# Patient Record
Sex: MALE | Race: WHITE | NOT HISPANIC OR LATINO | Employment: OTHER | URBAN - METROPOLITAN AREA
[De-identification: names, ages, dates, MRNs, and addresses within clinical notes are randomized per-mention and may not be internally consistent; named-entity substitution may affect disease eponyms.]

---

## 2023-01-26 ENCOUNTER — TRANSFERRED RECORDS (OUTPATIENT)
Dept: HEALTH INFORMATION MANAGEMENT | Facility: CLINIC | Age: 73
End: 2023-01-26
Payer: COMMERCIAL

## 2023-02-07 ENCOUNTER — TRANSCRIBE ORDERS (OUTPATIENT)
Dept: OTHER | Age: 73
End: 2023-02-07

## 2023-02-07 ENCOUNTER — PATIENT OUTREACH (OUTPATIENT)
Dept: ONCOLOGY | Facility: CLINIC | Age: 73
End: 2023-02-07
Payer: COMMERCIAL

## 2023-02-07 DIAGNOSIS — C06.9 SQUAMOUS CELL CARCINOMA OF MOUTH (H): Primary | ICD-10-CM

## 2023-02-07 NOTE — PROGRESS NOTES
I rec'd a medical oncology referral for this pt: Squamous cell carcinoma of the mouth. Images with [facility] (Dr. Murray Green) ph# [phone]. Path report with [facility] dentistry    I called Wilbert to learn more.  He states when he had a recent dental check up the dentist noticed a growth in his mouth.  The dentist had him return a couple weeks later and since it had not gone away he did a biopsy.  The biopsy shows SCC floor of mouth.  I asked and Wilbert denies having had any imaging at this time.  I explained to Wilbert that the next step is to have him see someone in ENT as we need to determine if further surgery can be done to remove more of the tumor, imaging to see if there are any more areas of concern and then depending on all of these results, he may or may not need to see oncology.    I have sent an urgent IB to ENT to see if they would like to see him first.  I told Wilbert to expect a call in the next couple of days.  I gave him my contact information in case he has any questions before ENT reaches out.    He also gave me the case number for hi pathology at the [facility]:  [id number]

## 2023-02-08 ENCOUNTER — TELEPHONE (OUTPATIENT)
Dept: OTOLARYNGOLOGY | Facility: CLINIC | Age: 73
End: 2023-02-08
Payer: COMMERCIAL

## 2023-02-08 NOTE — TELEPHONE ENCOUNTER
"LVM after being unable to reach on any of the listed numbers.Left ENT scheduling and direct line for scheduling.    SCHEDULING INSTRUCTIONS: Please schedule patient for NEW, NEW TUMOR P1, NEW TUMOR w/either Dr. Marte, Dr. Ballard or Dr. Calderon. Whatever is the first available among those options. Please include \"[facility] (Dr. Murray Green)  Path report with [facility]\" in appointment notes.  "

## 2023-02-08 NOTE — TELEPHONE ENCOUNTER
FUTURE VISIT INFORMATION:      FUTURE VISIT INFORMATION:    Date: 2/22/23    Time:1:30 pm    Location: [facility]  REFERRAL INFORMATION:    Referring provider: [facility]    Referring providers clinic: Dr. Murray Green    Reason for visit/diagnosis [facility] (Dr. Murray Green)  Path report with [facility] dentistry -- Squamous cell carcinoma of the mouth.    RECORDS REQUESTED FROM:       Clinic name Comments Records Status Imaging Status   [facility] *no recs Pending -  req 2/8/23    [facility] [phone] 1/3/23, 1/26/23, and 2/7/23 handwritten chart note from Dr. Murray Green Pending -  req 2/8/23- sent to scan 2/9/23     [facility] Otolaryngology 12/7/22 OV with Umair Gibson MD  CE    Oral Pathology Lab  [facility]    [address] [place] [address]  Ph: [phone]  Fax: [phone] 1/26/23 Case: [id number] Buccal Mucosa- Bisopy: Squamous cell carcinoma req 2/8/23      Sent to scan, re path transfer to [facility]                  February 8, 2023 at 12:39 PM - Sent a fax to [facility] for records/ path report. Reach out to [facility] for recs. Facility can email but is unable to fax as Provider hand wrote notes. Provided my e-mail and call number -Raysa  *Received path report, sent to scan. No records with [facility]. Copy of path report in provider's rightfax folder. Sent a fax to oral path with consult form for path to be transfer to [facility]  February 9, 2023 at 10:34 AM - Received from [facility] note and images sent via to my e-mail. Forward to RN and sent chart note to [facility] -[facility]

## 2023-02-22 ENCOUNTER — THERAPY VISIT (OUTPATIENT)
Dept: SPEECH THERAPY | Facility: CLINIC | Age: 73
End: 2023-02-22
Payer: COMMERCIAL

## 2023-02-22 ENCOUNTER — PRE VISIT (OUTPATIENT)
Dept: OTOLARYNGOLOGY | Facility: CLINIC | Age: 73
End: 2023-02-22

## 2023-02-22 ENCOUNTER — OFFICE VISIT (OUTPATIENT)
Dept: OTOLARYNGOLOGY | Facility: CLINIC | Age: 73
End: 2023-02-22
Payer: COMMERCIAL

## 2023-02-22 VITALS
HEIGHT: 70 IN | SYSTOLIC BLOOD PRESSURE: 138 MMHG | BODY MASS INDEX: 26.48 KG/M2 | WEIGHT: 185 LBS | HEART RATE: 70 BPM | DIASTOLIC BLOOD PRESSURE: 76 MMHG

## 2023-02-22 DIAGNOSIS — C06.9 PRIMARY SQUAMOUS CELL CARCINOMA OF ORAL CAVITY (H): Primary | ICD-10-CM

## 2023-02-22 DIAGNOSIS — C14.8 CANCER OF OVERLAPPING SITES OF LIP, ORAL CAVITY AND PHARYNX (H): ICD-10-CM

## 2023-02-22 PROCEDURE — 99207 PR NO CHARGE LOS: CPT | Performed by: SPEECH-LANGUAGE PATHOLOGIST

## 2023-02-22 PROCEDURE — 99204 OFFICE O/P NEW MOD 45 MIN: CPT | Mod: GC | Performed by: OTOLARYNGOLOGY

## 2023-02-22 RX ORDER — ATORVASTATIN CALCIUM 80 MG/1
40 TABLET, FILM COATED ORAL EVERY MORNING
COMMUNITY
Start: 2022-06-06

## 2023-02-22 RX ORDER — LISINOPRIL 30 MG/1
15 TABLET ORAL EVERY MORNING
COMMUNITY
Start: 2000-01-01

## 2023-02-22 RX ORDER — MULTIVIT WITH MINERALS/LUTEIN
TABLET ORAL EVERY MORNING
Status: ON HOLD | COMMUNITY
Start: 2000-01-01 | End: 2023-03-21

## 2023-02-22 RX ORDER — TAMSULOSIN HYDROCHLORIDE 0.4 MG/1
0.4 CAPSULE ORAL EVERY MORNING
Status: ON HOLD | COMMUNITY
Start: 2022-09-01 | End: 2023-03-21

## 2023-02-22 RX ORDER — MUPIROCIN 20 MG/G
OINTMENT TOPICAL
Status: ON HOLD | COMMUNITY
Start: 2022-06-27 | End: 2023-03-21

## 2023-02-22 RX ORDER — ATENOLOL 25 MG/1
25 TABLET ORAL EVERY MORNING
Status: ON HOLD | COMMUNITY
Start: 2000-01-01 | End: 2023-03-21

## 2023-02-22 RX ORDER — MOXIFLOXACIN 5 MG/ML
1 SOLUTION/ DROPS OPHTHALMIC 2 TIMES DAILY
COMMUNITY
Start: 2018-01-01

## 2023-02-22 RX ORDER — CYCLOSPORINE 0.5 MG/ML
EMULSION OPHTHALMIC
COMMUNITY

## 2023-02-22 ASSESSMENT — PAIN SCALES - GENERAL: PAINLEVEL: NO PAIN (0)

## 2023-02-22 NOTE — PROGRESS NOTES
Jenaro Kerr was seen for allied health care provider visit for introduction of self and SLP services. Pt with newly dx SCC inferior buccal mucosa extending to the RMT. He will undergo a left marginal mandibulectomy with WLE and free flap recon.  Provided information on trajectory of care and benefits of SLP services after surgery. Speaking and swallowing is currently WFL. Formal swallow evaluation indicated post op once medically cleared. All questions answered within scope of practice for pt and his wife. Encouraged pt to reach out with any questions or concerns. Time spent with patient 10 minutes.     MECHE Boles MA (music), CCC-SLP   Speech Language Pathologist  [facility] Trained Vocologist   [facility]  Dept. of Otolaryngology  Department of Rehabilitation Services  [address] [place] [address]  Email: [email or web address]   Phone: [phone]  Pronouns: she/her/hers

## 2023-02-22 NOTE — TELEPHONE ENCOUNTER
FUTURE VISIT INFORMATION      SURGERY INFORMATION:    SHALONDA Ballard/Vira    Consult: ov     RECORDS REQUESTED FROM:       Primary Care Provider: Matt    Most recent EKG+ Tracin21- [facility]    Most recent ECHO: 3/22/18- [facility]

## 2023-02-22 NOTE — LETTER
2/22/2023       RE: Jenaro Kerr  [address]  [place] [address]     Dear Colleague,    Thank you for referring your patient, Jenaro Kerr, to the [facility] EAR NOSE AND THROAT CLINIC [place] at [facility]. Please see a copy of my visit note below.    Left marginal mandibulectomy, WLE, ND, and RFFF  PET/CT   Otolaryngology Head and Neck Surgery Clinic  February 22, 2023    History of Present Illness:  Jenaro Kerr is a 74yo male with a history of lichen planus for 15 years who presents after a biopsy of that area was positive for invasive SCC. He has been watching the area of lichen planus along the buccal mucosa with his dentist who recently noticed it had increased in size and opted to biopsy it prior to placing the dental implant in. Since the biopsy he has noticed more pain in that area and sensitivity to acidic foods. He has not had any other areas of lichen planus. No neck masses or lesions, weight loss, odynophagia, dysphagia, difficult tongue mobility.     He is right hand dominant.     PMHx: HLD, HTN  PSHx: no large cardiac surgeries, hx of ear surgeries (OCR, Dr. Gibson, ). No issues with bleeding or anesthesia   Medications: lisinopril, atenolol, atorvastatin   Social Hx: hx of tobacco use (quit heavy use in 1986, totally in 1995), 1 gin & tonic per week. Retired. Lives with wife.      Physical Exam:  Alert and healthy appearing male, engaged in discussion   Symmetric facial movements, left hearing aid    No trismus, healthy oral cavity mucosa apart from firm irregular tissue in left gingivobuccal sulcus extending medially along mandible and slightly laterally into buccal mucosa. Gingival involvement is surrounding left 2nd molar (crown) and planned dental implant on 1st molar. No obvious mandible invasion. Does not extend to lingual surface of mandible. FOM soft, no nodularity. Tongue entirely normal appearing with full ROM. Oropharynx  healthy, and BOT soft.   Neck soft, no palpable LAD  Positive Milan's test on left forearm.   Breathing comfortably on RA.     Imaging: none  Pathology:  Reviewed oral surgery pathology report from 2/8/2023 biopsy. 1cm specimen, positive for SCC with evidence of perineural invasion.     A/P: Jenaro is a 74yo with a hx of lichen planus who presents with new oral cavity SCC (cT2Nx) left gingivobuccal sulcus and and gingival mucosa. No obvious signs of mandibular invasion on exam, nor any palpable lymphadenopathy however will need to complete formal oncologic work-up. We discussed recommendation for surgical resection and reviewed the anticipated plan, as well as post-operative course. Pending extent of resection he may require free flap reconstruction to limit scarring and improve swallowing outcomes.     - PET/CT   - Plan for left marginal mandibulectomy with WLE of buccal mucosa, left neck dissection, NGT placement, possible left radial forearm FF, possible tracheostomy.     Patient seen and plan discussed with Dr. Elio Mcbride MD   PGY3     Attestation signed by Nirmal Ballard MD at 2/26/2023  4:01 PM:  I, Nirmal Ballard MD, saw this patient with the resident/fellow and agree with the resident's findings and plan of care as documented in the resident's/fellow's note.        Again, thank you for allowing me to participate in the care of your patient.      Sincerely,    Nirmal Ballard MD

## 2023-02-22 NOTE — PROGRESS NOTES
Left marginal mandibulectomy, WLE, ND, and RFFF  PET/CT   Otolaryngology Head and Neck Surgery Clinic  February 22, 2023    History of Present Illness:  Jenaro Kerr is a 72yo male with a history of lichen planus for 15 years who presents after a biopsy of that area was positive for invasive SCC. He has been watching the area of lichen planus along the buccal mucosa with his dentist who recently noticed it had increased in size and opted to biopsy it prior to placing the dental implant in. Since the biopsy he has noticed more pain in that area and sensitivity to acidic foods. He has not had any other areas of lichen planus. No neck masses or lesions, weight loss, odynophagia, dysphagia, difficult tongue mobility.     He is right hand dominant.     PMHx: HLD, HTN  PSHx: no large cardiac surgeries, hx of ear surgeries (OCR, Dr. Gibson, ). No issues with bleeding or anesthesia   Medications: lisinopril, atenolol, atorvastatin   Social Hx: hx of tobacco use (quit heavy use in 1986, totally in 1995), 1 gin & tonic per week. Retired. Lives with wife.      Physical Exam:  Alert and healthy appearing male, engaged in discussion   Symmetric facial movements, left hearing aid    No trismus, healthy oral cavity mucosa apart from firm irregular tissue in left gingivobuccal sulcus extending medially along mandible and slightly laterally into buccal mucosa. Gingival involvement is surrounding left 2nd molar (crown) and planned dental implant on 1st molar. No obvious mandible invasion. Does not extend to lingual surface of mandible. FOM soft, no nodularity. Tongue entirely normal appearing with full ROM. Oropharynx healthy, and BOT soft.   Neck soft, no palpable LAD  Positive Milan's test on left forearm.   Breathing comfortably on RA.     Imaging: none  Pathology:  Reviewed oral surgery pathology report from 2/8/2023 biopsy. 1cm specimen, positive for SCC with evidence of perineural invasion.     A/P: Jenaro is a 72yo with a  hx of lichen planus who presents with new oral cavity SCC (cT2Nx) left gingivobuccal sulcus and and gingival mucosa. No obvious signs of mandibular invasion on exam, nor any palpable lymphadenopathy however will need to complete formal oncologic work-up. We discussed recommendation for surgical resection and reviewed the anticipated plan, as well as post-operative course. Pending extent of resection he may require free flap reconstruction to limit scarring and improve swallowing outcomes.     - PET/CT   - Plan for left marginal mandibulectomy with WLE of buccal mucosa, left neck dissection, NGT placement, possible left radial forearm FF, possible tracheostomy.     Patient seen and plan discussed with Dr. Elio Mcbride MD   PGY3

## 2023-02-23 ENCOUNTER — PREP FOR PROCEDURE (OUTPATIENT)
Dept: OTOLARYNGOLOGY | Facility: CLINIC | Age: 73
End: 2023-02-23
Payer: COMMERCIAL

## 2023-02-23 DIAGNOSIS — C06.9 PRIMARY SQUAMOUS CELL CARCINOMA OF ORAL CAVITY (H): Primary | ICD-10-CM

## 2023-02-27 DIAGNOSIS — C14.8 CANCER OF OVERLAPPING SITES OF LIP, ORAL CAVITY AND PHARYNX (H): Primary | ICD-10-CM

## 2023-02-27 DIAGNOSIS — C06.9 PRIMARY SQUAMOUS CELL CARCINOMA OF ORAL CAVITY (H): ICD-10-CM

## 2023-03-01 ENCOUNTER — VIRTUAL VISIT (OUTPATIENT)
Dept: SURGERY | Facility: CLINIC | Age: 73
End: 2023-03-01
Payer: COMMERCIAL

## 2023-03-01 ENCOUNTER — OFFICE VISIT (OUTPATIENT)
Dept: OTOLARYNGOLOGY | Facility: CLINIC | Age: 73
End: 2023-03-01
Payer: COMMERCIAL

## 2023-03-01 ENCOUNTER — PRE VISIT (OUTPATIENT)
Dept: SURGERY | Facility: CLINIC | Age: 73
End: 2023-03-01

## 2023-03-01 ENCOUNTER — HOSPITAL ENCOUNTER (OUTPATIENT)
Dept: PET IMAGING | Facility: CLINIC | Age: 73
Discharge: HOME | End: 2023-03-01
Attending: OTOLARYNGOLOGY | Admitting: OTOLARYNGOLOGY
Payer: COMMERCIAL

## 2023-03-01 ENCOUNTER — ANESTHESIA EVENT (OUTPATIENT)
Dept: SURGERY | Facility: CLINIC | Age: 73
End: 2023-03-01

## 2023-03-01 VITALS
SYSTOLIC BLOOD PRESSURE: 122 MMHG | HEIGHT: 70 IN | BODY MASS INDEX: 27.41 KG/M2 | DIASTOLIC BLOOD PRESSURE: 75 MMHG | WEIGHT: 191.5 LBS | HEART RATE: 61 BPM

## 2023-03-01 DIAGNOSIS — C06.9 PRIMARY SQUAMOUS CELL CARCINOMA OF ORAL CAVITY (H): ICD-10-CM

## 2023-03-01 DIAGNOSIS — C14.8 CANCER OF OVERLAPPING SITES OF LIP, ORAL CAVITY AND PHARYNX (H): ICD-10-CM

## 2023-03-01 DIAGNOSIS — C06.9 PRIMARY SQUAMOUS CELL CARCINOMA OF ORAL CAVITY (H): Primary | ICD-10-CM

## 2023-03-01 DIAGNOSIS — Z01.818 PREOP EXAMINATION: Primary | ICD-10-CM

## 2023-03-01 PROCEDURE — 99215 OFFICE O/P EST HI 40 MIN: CPT | Performed by: OTOLARYNGOLOGY

## 2023-03-01 PROCEDURE — 78815 PET IMAGE W/CT SKULL-THIGH: CPT | Mod: 26 | Performed by: RADIOLOGY

## 2023-03-01 PROCEDURE — 78815 PET IMAGE W/CT SKULL-THIGH: CPT | Mod: PI

## 2023-03-01 PROCEDURE — A9552 F18 FDG: HCPCS | Performed by: OTOLARYNGOLOGY

## 2023-03-01 PROCEDURE — 99203 OFFICE O/P NEW LOW 30 MIN: CPT | Mod: VID | Performed by: PHYSICIAN ASSISTANT

## 2023-03-01 PROCEDURE — 343N000001 HC RX 343: Performed by: OTOLARYNGOLOGY

## 2023-03-01 RX ADMIN — FLUDEOXYGLUCOSE F-18 10.96 MILLICURIE: 500 INJECTION, SOLUTION INTRAVENOUS at 09:12

## 2023-03-01 ASSESSMENT — ENCOUNTER SYMPTOMS: SEIZURES: 0

## 2023-03-01 ASSESSMENT — PAIN SCALES - GENERAL: PAINLEVEL: NO PAIN (0)

## 2023-03-01 ASSESSMENT — LIFESTYLE VARIABLES: TOBACCO_USE: 1

## 2023-03-01 NOTE — LETTER
"3/1/2023       RE: Jenaro Kerr  [address]  [place] [address]     Dear Colleague,    Thank you for referring your patient, Jenaro Kerr, to the [facility] EAR NOSE AND THROAT CLINIC [place] at [facility]. Please see a copy of my visit note below.    Facial Plastic and Reconstructive Surgery Consultation    Referring Provider:  Nirmal Ballard MD    HPI:   I had the pleasure of seeing Jenaro Kerr today in clinic for consultation for reconstruction with free tissue.   His history from Dr. Ballard is as follows, \"Jenaro Kerr is a 74yo male with a history of lichen planus for 15 years who presents after a biopsy of that area was positive for invasive SCC, left cheek. He has been watching the area of lichen planus along the buccal mucosa with his dentist who recently noticed it had increased in size and opted to biopsy it prior to placing the dental implant in. Since the biopsy he has noticed more pain in that area and sensitivity to acidic foods. He has not had any other areas of lichen planus. No neck masses or lesions, weight loss, odynophagia, dysphagia, difficult tongue mobility.\"    Dr. Ballard noted a lesion that needs wide local excision and then free tissue transfer for reconstruction. He will also have a neck dissection and trach. The consideration is radial forearm free flap. His biopsy noted perineural invasion and thus adjuvant therapy may be likely.      Review Of Systems  ROS: 10 point ROS neg other than the symptoms noted above in the HPI.    Patient Active Problem List   Diagnosis   (none) - all problems resolved or deleted     No past surgical history on file.  Current Outpatient Medications   Medication Sig Dispense Refill     atenolol (TENORMIN) 25 MG tablet Take 25 mg by mouth       atorvastatin (LIPITOR) 80 MG tablet Take 40 mg by mouth       cycloSPORINE (RESTASIS) 0.05 % ophthalmic emulsion two times a day.       " lisinopril (ZESTRIL) 30 MG tablet Take 30 mg by mouth       moxifloxacin (VIGAMOX) 0.5 % ophthalmic solution 1 drop 2 times daily       multivitamin (CENTRUM SILVER) tablet        mupirocin (BACTROBAN) 2 % external ointment        tamsulosin (FLOMAX) 0.4 MG capsule Take 0.4 mg by mouth       Erythromycin  Social History     Socioeconomic History     Marital status:      Spouse name: Not on file     Number of children: Not on file     Years of education: Not on file     Highest education level: Not on file   Occupational History     Not on file   Tobacco Use     Smoking status: Not on file     Smokeless tobacco: Not on file   Substance and Sexual Activity     Alcohol use: Not on file     Drug use: Not on file     Sexual activity: Not on file   Other Topics Concern     Not on file   Social History Narrative     Not on file     Social Determinants of Health     Financial Resource Strain: Not on file   Food Insecurity: Not on file   Transportation Needs: Not on file   Physical Activity: Not on file   Stress: Not on file   Social Connections: Not on file   Intimate Partner Violence: Not on file   Housing Stability: Not on file     No family history on file.    PE:  Alert and Oriented, Answering Questions Appropriately  Atraumatic, Normocephalic, Face Symmetric  Skin: Vasquez 2   Facial Nerve Intact and facial movement symmetric  EOM's, PEERL  Nasal Exam: No external Deformity, no mucopurulence or polyps, no masses  Chin: Normal   Lips/Teeth/Toungue/Gums: Lips intact, Normal Dentition, Occlusion intact, Oral mucosa intact except for the left cheek and buccal sulcus mucosa along the mandible, lichen planus surrounding molar crown, tissue against the buccal surface of the mandible is irregular,Tongue mobile  Chest: No wheezing, cyanosis, or stridor  Card: Normal upper extremity pulses and capillary refill, not diaphoretic  Neuro/Psych: CN's 2-12 intact, Moves all extremities, ambulation in intact, positive  affect, no notable muscle weakness  Right handed, Milan's test on the left with radial dependence of blood supply- this was checked on the right hand and there was a significant radial dominance there also.          Imaging:PET evaluated from today, left cheek and looks like two possible lymph nodes, read is not available yet      IMPRESSION:    Left buccal sulcus squamous cell carcinoma  Need for reconstruction    PLAN:  Will need tissue reconstruction  Due to radial dominance I feel reticent to perform a radial artery based flap, options are ulnar based flap and ALT versus ALT fascia flap.     I discussed this with the patient today. We spent a lot of time discussing his perioperative management in addition to possible need for trach. It would be nice to try to avoid this. We also discussed neck dissection, adjuvant therapy, feeding tube and possible complications of surgery.     Photodocumentation was obtained.     I spent a total of 60 minutes in the care of patient Jenaro Kerr during today's office visit. This time includes reviewing the patient's chart and prior history, obtaining a history, performing an examination and evaluation and counseling the patient. This time also includes ordering mediations or tests necessary in addition to communication to other member's of the patient's health care team. Time spent in documentation and care coordination is included.     Again, thank you for allowing me to participate in the care of your patient.      Sincerely,    Vivi Clement MD

## 2023-03-01 NOTE — H&P
Pre-Operative H & P     CC:  Preoperative exam to assess for increased cardiopulmonary risk while undergoing surgery and anesthesia.    Date of Encounter: 3/1/2023  Primary Care Physician:  No primary care provider on file.     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     Mr. Kerr has a history of a history of lichen planus along the buccal mucosa for 15 years. He has been watching the area with his dentist who recently noticed it had increased in size. A biopsy of that area was positive for invasive SCC. He now presents for the above procedure.     PMH is otherwise unremarkable.    History was obtained from patient & chart review.     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       lisinopril (ZESTRIL) 30 MG tablet Take 30 mg by  mouth every morning       moxifloxacin (VIGAMOX) 0.5 % ophthalmic solution 1 drop 2 times daily       Multiple Vitamins-Minerals (EYE VITAMINS PO) Take by mouth every morning       multivitamin (CENTRUM SILVER) tablet every morning       tamsulosin (FLOMAX) 0.4 MG capsule Take 0.4 mg by mouth every morning       mupirocin (BACTROBAN) 2 % external ointment          Allergies  Allergies   Allergen Reactions     Erythromycin GI Disturbance     Other reaction(s): Gastrointestinal       Social History  Social History     Socioeconomic History     Marital status:      Spouse name: Not on file     Number of children: Not on file     Years of education: Not on file     Highest education level: Not on file   Occupational History     Not on file   Tobacco Use     Smoking status: Former     Packs/day: 1.00     Years: 23.00     Pack years: 23.00     Types: Cigarettes     Quit date:      Years since quittin.     Smokeless tobacco: Never   Substance and Sexual Activity     Alcohol use: Yes     Drug use: Never     Sexual activity: Not on file   Other Topics Concern     Not on file   Social History Narrative     Not on file     Social Determinants of Health     Financial Resource Strain: Not on file   Food Insecurity: Not on file   Transportation Needs: Not on file   Physical Activity: Not on file   Stress: Not on file   Social Connections: Not on file   Intimate Partner Violence: Not on file   Housing Stability: Not on file       Family History  Family History   Problem Relation Age of Onset     Anesthesia Reaction No family hx of      Deep Vein Thrombosis (DVT) No family hx of      Cardiovascular No family hx of        Review of Systems  The complete review of systems is negative other than noted in the HPI or here.     Anesthesia Evaluation   Pt has had prior anesthetic.     No history of anesthetic complications       ROS/MED HX  ENT/Pulmonary:     (+) tobacco use, Past use,  (-) asthma and sleep apnea    Neurologic:  - neg neurologic ROS  (-) no seizures and no CVA   Cardiovascular: Comment: Taking atenolol for palpitations      (+) -----Irregular Heartbeat/Palpitations, Previous cardiac testing   Echo: Date: 2018 Results:  Left ventricular ejection fraction is visually estimated at 60%.   Normal right ventricle size and normal global function.   Mild aortic insufficiency.   The ascending aorta is normal in size.   No prior study for comparison.     Stress Test: Date: Results:    ECG Reviewed: Date: Results:    Cath: Date: Results:      METS/Exercise Tolerance: >4 METS    Hematologic:  - neg hematologic  ROS  (-) history of blood clots and history of blood transfusion   Musculoskeletal:   (+) arthritis (knees),     GI/Hepatic:  - neg GI/hepatic ROS  (-) GERD and liver disease   Renal/Genitourinary:  - neg Renal ROS  (-) renal disease   Endo:  - neg endo ROS  (-) Type II DM   Psychiatric/Substance Use:  - neg psychiatric ROS     Infectious Disease:  - neg infectious disease ROS     Malignancy:   (+) Malignancy, History of Other.Other CA SCC oral cavity Active status post.    Other: Comment: Lichen planus           Virtual visit -  No vitals were obtained    Physical Exam  Constitutional: Awake, alert, cooperative, no apparent distress, and appears stated age.  HENT: Normocephalic  Respiratory: non labored breathing   Neurologic: Awake, alert, oriented to name, place and time.   Neuropsychiatric: Calm, cooperative. Normal affect.      Prior Labs/Diagnostic Studies   All labs and imaging personally reviewed     Echocardiogram -  please see in ROS above     The patient's records and results personally reviewed by this provider.     Outside records reviewed from: Care Everywhere (Echocardiogram @ [facility])     LABS to be collected at [facility]:  T&S      Assessment      Jenaro Kerr is a 73 year old male seen as a PAC referral for risk assessment and optimization for anesthesia.    Plan/Recommendations  Pt  "will be optimized for the proposed procedure.  See below for details on the assessment, risk, and preoperative recommendations    NEUROLOGY  - No history of TIA, CVA or seizure    -Post Op delirium risk factors:  No risk identified    ENT  - Primary SCC of oral cavity in setting of lichen planus  - No current airway concerns.  Will need to be reassessed day of surgery.  Mallampati: Unable to assess  TM: Unable to assess    CARDIAC  - No history of CAD, Hypertension and Afib   - Taking atenolol for palpitations; will continue  - will hold lisinopril DOS    - METS (Metabolic Equivalents)  Patient performs 4 or more METS exercise without symptoms            Total Score: 0      RCRI-Low risk: Class 2 0.9% complication rate            Total Score: 1    RCRI: High Risk Surgery        PULMONARY  BEBETO Low Risk            Total Score: 2    BEBETO: Over 50 ys old    BEBETO: Male      - Denies asthma or inhaler use  - Tobacco History      History   Smoking Status     Former     Packs/day: 1.00     Years: 23.00     Types: Cigarettes     Quit date: 1995   Smokeless Tobacco     Never       GI  - Denies GERD  PONV Medium Risk  Total Score: 2           1 AN PONV: Patient is not a current smoker    1 AN PONV: Intended Post Op Opioids        ENDOCRINE    - BMI: Estimated body mass index is 27.48 kg/m  as calculated from the following:    Height as of an earlier encounter on 3/1/23: 1.778 m (5' 10\").    Weight as of an earlier encounter on 3/1/23: 86.9 kg (191 lb 8 oz).  Healthy Weight (BMI 18.5-24.9)  - No history of Diabetes Mellitus    HEME  VTE High Risk 3%            Total Score: 8    VTE: Greater than 59 yrs old    VTE: Male    VTE: Current cancer      - No history of abnormal bleeding or antiplatelet use.      The patient is aware that the final anesthesia plan will be decided by the assigned anesthesia provider on the date of service.    The patient is optimized for their procedure. AVS with information on surgery time/arrival time, " meds and NPO status given by nursing staff. No further diagnostic testing indicated.    Please refer to the physical examination documented by the anesthesiologist in the anesthesia record on the day of surgery.    Video-Visit Details    Type of service:  Video Visit    Provider received verbal consent for a Video Visit from the patient? Yes   Video Start Time: 1515  Video End Time:1528    Originating Location (pt. Location): Other at work    Distant Location (provider location):  Off-site  Mode of Communication:  Video Conference via Massive  On the day of service:     Prep time: 14 minutes  Visit time: 13 minutes  Documentation time: 12 minutes  ------------------------------------------  Total time: 39 minutes      Qi Triana PA-C  Preoperative Assessment Center  [facility]  Clinic and Surgery Center  Phone: [phone]  Fax: [phone]

## 2023-03-01 NOTE — PROGRESS NOTES
Iwlbert is a 73 year old who is being evaluated via a billable video visit.      How would you like to obtain your AVS? Bret            Subjective   Wilbert is a 73 year old; presenting for the following health issues:  Pre-Op Exam (/)      HPI         Review of Systems         Physical Exam       VLADIMIR Jama LPN

## 2023-03-01 NOTE — PATIENT INSTRUCTIONS
Preparing for Your Surgery      Name:  Jenaro Kerr   MRN:  [id number]   :  1950   Today's Date:  3/1/2023       Arriving for surgery:  Surgery date:  3/16/23  Arrival time:  05:30 am     Surgeries and procedures: Adult patients can have 2 visitors all through the surgery process.     Visiting hours: 8 a.m. to 8:30 p.m.     Hospital: Adult patients and children under age 18 can have 4 visitor at a time     No visitors under the age of 5 are allowed for hospital patients.  Double occupancy rooms: Patients can have only two visitors at a time.     Patients with disabilities: Can have a support person with them (family member, service provider     Or someone well informed about their needs) plus the allowed number of visitors     Patients confirmed or suspected to have symptoms of COVID 19 or flu:     No visitors allowed for adult patients.   Children (under age 18) can have 1 named visitor.     People who are sick or showing symptoms of COVID 19 or flu:    Are not allowed to visit patients--we can only make exceptions in special situations.       Please follow these guidelines for your visit:   Arrive wearing a mask over your mouth and nose; we will give you a medical mask to wear    If you arrive wearing a cloth mask.   Keep it on during your entire visit, even when in patient's room.   If you don't wear a mask we'll ask you to leave.     Clean your hands with alcohol hand . Do this when you arrive at and leave the building and patient room,    And again after you touch your mask or anything in the room.     You can t visit if you have a fever, cough, shortness of breath, muscle aches, headaches, sore throat    Or diarrhea      Stay 6 feet away from others during your visit and between visits     Go directly to and from the room you are visiting.     Stay in the patient s room during your visit. Limit going to other places in the hospital as much as possible     Leave bags and jackets at home or in  the car.     For everyone s health, please don t come and go during your visit. That includes for smoking   during your visit.     Please come to:     [facility] [address]  [address] [place]  [address]    - ? parking is available in front of the hospital      -    Please proceed to [address] on the 3rd floor. [phone]?     - ?If you are in need of directions, wheelchair or escort please stop at the Information Desk in the lobby.  Inform the information person that you are here for surgery; a wheelchair and escort to [address] will be provided.?     What can I eat or drink?  -  You may eat and drink normally up to 8 hours prior to arrival time.  -  You may have clear liquids until 2 hours prior to arrival time.     Examples of clear liquids:  Water  Clear broth  Juices (apple, white grape, white cranberry  and cider) without pulp  Noncarbonated, powder based beverages  (lemonade and Norberto-Aid)  Sodas (Sprite, 7-Up, ginger ale and seltzer)  Coffee or tea (without milk or cream)  Gatorade    -  No Alcohol for at least 24 hours before surgery.     Which medicines can I take?  Hold Aspirin for 7 days before surgery.   Hold Multivitamins for 7 days before surgery.  Hold Supplements for 7 days before surgery.  Hold Ibuprofen (Advil, Motrin) for 1 day(s) before surgery--unless otherwise directed by surgeon.  Hold Naproxen (Aleve) for 4 days before surgery.    -  DO NOT take these medications the day of surgery:  Lisinopril.  -  PLEASE TAKE these medications the day of surgery:  Tylenol if needed; take other morning medications.  Bring eye drops to hospital if using currently.    How do I prepare myself?  - Please take 2 showers (one the night prior to surgery and one the morning of surgery) using Scrubcare or Hibiclens soap.    Use this soap only from the neck to your toes.     Leave the soap on your skin for one minute--then rinse thoroughly.       You may use your own shampoo and conditioner. No other hair products.   - Please remove all jewelry and body piercings.  - No lotions, deodorants or fragrance.  - No makeup or fingernail polish.   - Bring your ID and insurance card.    -If you have a Deep Brain Stimulator, Spinal Cord Stimulator, or any Neuro Stimulator device---you must bring the remote control to the hospital.      ALL PATIENTS GOING HOME THE SAME DAY OF SURGERY ARE REQUIRED TO HAVE A RESPONSIBLE ADULT TO DRIVE AND BE IN ATTENDANCE WITH THEM FOR 24 HOURS FOLLOWING SURGERY.    Covid testing policy as of 12/06/2022  Your surgeon will notify and schedule you for a COVID test if one is needed before surgery--please direct any questions or COVID symptoms to your surgeon      Questions or Concerns:    - For any questions regarding the day of surgery or your hospital stay, please contact the Pre Admission Nursing Office at [phone].       - If you have health changes between today and your surgery, please call your surgeon.       - For questions after surgery, please call your surgeons office.

## 2023-03-01 NOTE — TUMOR CONFERENCE
Head & Neck Tumor Conference Note   Status: New  Staff: Dr. Ballard     Tumor Site: left buccal mucosa  Tumor Pathology: p16 negative SCC   Tumor Stage: cT2Nx  Tumor Treatment: TBD    Reason for Review: Review imaging, path, and POC    Brief History: This is a 73 year old male with a history of lichen planus for 15 years who presents after a biopsy of that area was positive for invasive SCC. He has been watching the area of lichen planus along the buccal mucosa with his dentist who recently noticed it had increased in size and opted to biopsy it prior to placing the dental implant in. Since the biopsy he has noticed more pain in that area and sensitivity to acidic foods. He has not had any other areas of lichen planus. No neck masses or lesions, weight loss, odynophagia, dysphagia, difficult tongue mobility. He is right hand dominant.     Here to review PET/CT.     PMHx: HLD, HTN  PSHx: no large cardiac surgeries, hx of ear surgeries (OCR, Dr. Gibson, ). No issues with bleeding or anesthesia   Social Hx: hx of tobacco use (quit heavy use in 1986, totally in 1995), 1 gin & tonic per week. Retired. Lives with wife.      Imaging:   PET/CT 3/1/2023-- Final read pending     Pathology:   Reviewed oral surgery pathology report from 2/8/2023 biopsy. 1cm specimen, positive for SCC with evidence of perineural invasion.     Tumor Board Recommendation:   Reviewed new PET/CT, demonstrating large primary lesion in buccal mucosa with two adjacent FDG avid lymph nodes in left level 1B on left. No other concerning cervical lymphadenopathy.     - Surgical resection with WLE, L neck dissection, and free flap reconstruction.     Winifred Mcbride MD  Otolaryngology Head and Neck Surgery Resident, PGY-3    Documentation / Disclaimer Cancer Tumor Board Note: Cancer tumor board recommendations do not override what is determined to be reasonable care and treatment, which is dependent on the circumstances of a patient's case; the patient's  medical, social, and personal concerns; and the clinical judgment of the oncologist [physician].

## 2023-03-01 NOTE — PROGRESS NOTES
"Facial Plastic and Reconstructive Surgery Consultation    Referring Provider:  Nirmal Ballard MD    HPI:   I had the pleasure of seeing Jenaro Kerr today in clinic for consultation for reconstruction with free tissue.   His history from Dr. Ballard is as follows, \"Jenaro Kerr is a 74yo male with a history of lichen planus for 15 years who presents after a biopsy of that area was positive for invasive SCC, left cheek. He has been watching the area of lichen planus along the buccal mucosa with his dentist who recently noticed it had increased in size and opted to biopsy it prior to placing the dental implant in. Since the biopsy he has noticed more pain in that area and sensitivity to acidic foods. He has not had any other areas of lichen planus. No neck masses or lesions, weight loss, odynophagia, dysphagia, difficult tongue mobility.\"    Dr. Ballard noted a lesion that needs wide local excision and then free tissue transfer for reconstruction. He will also have a neck dissection and trach. The consideration is radial forearm free flap. His biopsy noted perineural invasion and thus adjuvant therapy may be likely.      Review Of Systems  ROS: 10 point ROS neg other than the symptoms noted above in the HPI.    Patient Active Problem List   Diagnosis   (none) - all problems resolved or deleted     No past surgical history on file.  Current Outpatient Medications   Medication Sig Dispense Refill     atenolol (TENORMIN) 25 MG tablet Take 25 mg by mouth       atorvastatin (LIPITOR) 80 MG tablet Take 40 mg by mouth       cycloSPORINE (RESTASIS) 0.05 % ophthalmic emulsion two times a day.       lisinopril (ZESTRIL) 30 MG tablet Take 30 mg by mouth       moxifloxacin (VIGAMOX) 0.5 % ophthalmic solution 1 drop 2 times daily       multivitamin (CENTRUM SILVER) tablet        mupirocin (BACTROBAN) 2 % external ointment        tamsulosin (FLOMAX) 0.4 MG capsule Take 0.4 mg by mouth       Erythromycin  Social History "     Socioeconomic History     Marital status:      Spouse name: Not on file     Number of children: Not on file     Years of education: Not on file     Highest education level: Not on file   Occupational History     Not on file   Tobacco Use     Smoking status: Not on file     Smokeless tobacco: Not on file   Substance and Sexual Activity     Alcohol use: Not on file     Drug use: Not on file     Sexual activity: Not on file   Other Topics Concern     Not on file   Social History Narrative     Not on file     Social Determinants of Health     Financial Resource Strain: Not on file   Food Insecurity: Not on file   Transportation Needs: Not on file   Physical Activity: Not on file   Stress: Not on file   Social Connections: Not on file   Intimate Partner Violence: Not on file   Housing Stability: Not on file     No family history on file.    PE:  Alert and Oriented, Answering Questions Appropriately  Atraumatic, Normocephalic, Face Symmetric  Skin: Vasquez 2   Facial Nerve Intact and facial movement symmetric  EOM's, PEERL  Nasal Exam: No external Deformity, no mucopurulence or polyps, no masses  Chin: Normal   Lips/Teeth/Toungue/Gums: Lips intact, Normal Dentition, Occlusion intact, Oral mucosa intact except for the left cheek and buccal sulcus mucosa along the mandible, lichen planus surrounding molar crown, tissue against the buccal surface of the mandible is irregular,Tongue mobile  Chest: No wheezing, cyanosis, or stridor  Card: Normal upper extremity pulses and capillary refill, not diaphoretic  Neuro/Psych: CN's 2-12 intact, Moves all extremities, ambulation in intact, positive affect, no notable muscle weakness  Right handed, Milan's test on the left with radial dependence of blood supply- this was checked on the right hand and there was a significant radial dominance there also.          Imaging:PET evaluated from today, left cheek and looks like two possible lymph nodes, read is not available  yet      IMPRESSION:    Left buccal sulcus squamous cell carcinoma  Need for reconstruction      PLAN:  Will need tissue reconstruction  Due to radial dominance I feel reticent to perform a radial artery based flap, options are ulnar based flap and ALT versus ALT fascia flap.     I discussed this with the patient today. We spent a lot of time discussing his perioperative management in addition to possible need for trach. It would be nice to try to avoid this. We also discussed neck dissection, adjuvant therapy, feeding tube and possible complications of surgery.     Photodocumentation was obtained.     I spent a total of 60 minutes in the care of patient Jenaro Kerr during today's office visit. This time includes reviewing the patient's chart and prior history, obtaining a history, performing an examination and evaluation and counseling the patient. This time also includes ordering mediations or tests necessary in addition to communication to other member's of the patient's health care team. Time spent in documentation and care coordination is included.

## 2023-03-02 ENCOUNTER — PATIENT OUTREACH (OUTPATIENT)
Dept: OTOLARYNGOLOGY | Facility: CLINIC | Age: 73
End: 2023-03-02
Payer: COMMERCIAL

## 2023-03-02 NOTE — TELEPHONE ENCOUNTER
Received a call from patient requesting PET scan results. Reviewed with patient that results are not final yet and writer would call with results once completed. Also advised that we will call tomorrow after tumor board review. Patient appreciative of update and will await call with results.     Patient was encouraged to call with further questions or concerns.       Leonor Husain RN, BSN

## 2023-03-03 ENCOUNTER — TUMOR CONFERENCE (OUTPATIENT)
Dept: ONCOLOGY | Facility: CLINIC | Age: 73
End: 2023-03-03
Payer: COMMERCIAL

## 2023-03-03 NOTE — TUMOR CONFERENCE
Called and spoke with patient regarding tumor board discussion and recommendations to proceed with surgery as planned with Dr. Ballard and Dr. Clement. Reviewed with patient that PET scan results are still not final but on tumor board review, nothing additional seen on scan. We will call to confirm results once final.     Patient has no additional questions regarding surgery or plan at this time. He was encouraged to call with any further questions or concerns.     Surgery planned for 3/16 and scheduled. PAC already completed.     Leonor Husain, RN, BSN     contact

## 2023-03-05 LAB
ABO/RH(D): NORMAL
ANTIBODY SCREEN: NEGATIVE
SPECIMEN EXPIRATION DATE: NORMAL

## 2023-03-06 ENCOUNTER — LAB (OUTPATIENT)
Dept: LAB | Facility: OTHER | Age: 73
End: 2023-03-06
Payer: COMMERCIAL

## 2023-03-06 DIAGNOSIS — Z01.818 PREOP EXAMINATION: ICD-10-CM

## 2023-03-06 DIAGNOSIS — C06.9 PRIMARY SQUAMOUS CELL CARCINOMA OF ORAL CAVITY (H): ICD-10-CM

## 2023-03-06 PROCEDURE — 86900 BLOOD TYPING SEROLOGIC ABO: CPT

## 2023-03-06 PROCEDURE — 86850 RBC ANTIBODY SCREEN: CPT

## 2023-03-06 PROCEDURE — 86901 BLOOD TYPING SEROLOGIC RH(D): CPT

## 2023-03-06 PROCEDURE — 36415 COLL VENOUS BLD VENIPUNCTURE: CPT

## 2023-03-14 ENCOUNTER — ANESTHESIA EVENT (OUTPATIENT)
Dept: SURGERY | Facility: CLINIC | Age: 73
DRG: 141 | End: 2023-03-14
Payer: COMMERCIAL

## 2023-03-15 ASSESSMENT — LIFESTYLE VARIABLES: TOBACCO_USE: 1

## 2023-03-15 ASSESSMENT — ENCOUNTER SYMPTOMS: SEIZURES: 0

## 2023-03-15 NOTE — ANESTHESIA PREPROCEDURE EVALUATION
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      Past Surgical History:   Procedure Laterality Date     EYE SURGERY  2019     HERNIA REPAIR       LEG SURGERY Right     fracture     ID ANESTH,EAR SURGERY        Allergies   Allergen Reactions     Erythromycin GI Disturbance     Other reaction(s): Gastrointestinal      Social History     Tobacco Use     Smoking status: Former     Packs/day: 1.00     Years: 23.00     Pack years: 23.00     Types: Cigarettes     Quit date:      Years since quittin.2     Smokeless tobacco: Never   Substance Use Topics     Alcohol use: Yes      Wt Readings from Last 1 Encounters:   23 86.9 kg (191 lb 8 oz)        Anesthesia Evaluation   Pt has had prior anesthetic. Type: General.        ROS/MED HX  ENT/Pulmonary:     (+) tobacco use, Past use,  (-) asthma and sleep apnea   Neurologic:  - neg neurologic ROS  (-) no seizures and no CVA   Cardiovascular: Comment: Taking atenolol for palpitations      (+) -----Irregular Heartbeat/Palpitations, Previous cardiac testing   Echo: Date:  Results:  Left ventricular ejection fraction is visually estimated at 60%.   Normal right ventricle size and normal global function.   Mild aortic insufficiency.   The ascending aorta is normal in size.   No prior study for comparison.     Stress Test: Date: Results:    ECG Reviewed: Date: Results:    Cath: Date: Results:      METS/Exercise Tolerance: >4 METS    Hematologic:  - neg hematologic  ROS  (-) history of blood clots and history of blood  transfusion   Musculoskeletal:   (+) arthritis (knees),     GI/Hepatic:  - neg GI/hepatic ROS  (-) GERD and liver disease   Renal/Genitourinary:  - neg Renal ROS  (-) renal disease   Endo:  - neg endo ROS  (-) Type II DM   Psychiatric/Substance Use:  - neg psychiatric ROS     Infectious Disease:  - neg infectious disease ROS     Malignancy:   (+) Malignancy, History of Other.Other CA SCC oral cavity Active status post.    Other: Comment: Lichen planus           Physical Exam    Airway        Mallampati: II   TM distance: > 3 FB   Neck ROM: full   Mouth opening: > 3 cm    Respiratory Devices and Support         Dental       (+) Modest Abnormalities - crowns, retainers, 1 or 2 missing teeth      Cardiovascular   cardiovascular exam normal          Pulmonary           breath sounds clear to auscultation       Other findings: Lesion on buccal mucosa, appears to be between gum and cheek, mid structures appear within normal limits    OUTSIDE LABS:  CBC: No results found for: WBC, HGB, HCT, PLT  BMP: No results found for: NA, POTASSIUM, CHLORIDE, CO2, BUN, CR, GLC  COAGS: No results found for: PTT, INR, FIBR  POC: No results found for: BGM, HCG, HCGS  HEPATIC: No results found for: ALBUMIN, PROTTOTAL, ALT, AST, GGT, ALKPHOS, BILITOTAL, BILIDIRECT, MAGDALENA  OTHER: No results found for: PH, LACT, A1C, SAMINA, PHOS, MAG, LIPASE, AMYLASE, TSH, T4, T3, CRP, SED    Anesthesia Plan    ASA Status:  3   NPO Status:  NPO Appropriate    Anesthesia Type: General.     - Airway: ETT   Induction: Intravenous.   Maintenance: TIVA.   Techniques and Equipment:     - Airway: Nasal GILBERT, Video-Laryngoscope     - Lines/Monitors: 2nd IV, Arterial Line     - Drips/Meds: Remifentanil     Consents    Anesthesia Plan(s) and associated risks, benefits, and realistic alternatives discussed. Questions answered and patient/representative(s) expressed understanding.     - Discussed: Risks, Benefits and Alternatives for BOTH SEDATION and the PROCEDURE were  discussed     - Discussed with:  Patient      - Extended Intubation/Ventilatory Support Discussed: Yes.      - Patient is DNR/DNI Status: No    Use of blood products discussed: No .     Postoperative Care    Pain management: IV analgesics, Oral pain medications, Multi-modal analgesia.   PONV prophylaxis: Ondansetron (or other 5HT-3), Dexamethasone or Solumedrol     Comments:                Valente Patiño MD

## 2023-03-16 ENCOUNTER — ANESTHESIA (OUTPATIENT)
Dept: SURGERY | Facility: CLINIC | Age: 73
DRG: 141 | End: 2023-03-16
Payer: COMMERCIAL

## 2023-03-16 ENCOUNTER — APPOINTMENT (OUTPATIENT)
Dept: GENERAL RADIOLOGY | Facility: CLINIC | Age: 73
DRG: 141 | End: 2023-03-16
Attending: STUDENT IN AN ORGANIZED HEALTH CARE EDUCATION/TRAINING PROGRAM
Payer: COMMERCIAL

## 2023-03-16 ENCOUNTER — HOSPITAL ENCOUNTER (INPATIENT)
Facility: CLINIC | Age: 73
LOS: 5 days | Discharge: HOME | DRG: 141 | End: 2023-03-21
Attending: OTOLARYNGOLOGY | Admitting: OTOLARYNGOLOGY
Payer: COMMERCIAL

## 2023-03-16 ENCOUNTER — APPOINTMENT (OUTPATIENT)
Dept: GENERAL RADIOLOGY | Facility: CLINIC | Age: 73
DRG: 141 | End: 2023-03-16
Attending: OTOLARYNGOLOGY
Payer: COMMERCIAL

## 2023-03-16 DIAGNOSIS — C06.0 SQUAMOUS CELL CARCINOMA OF ORAL MUCOSA (H): Primary | ICD-10-CM

## 2023-03-16 LAB
ABO/RH(D): NORMAL
ANTIBODY SCREEN: NEGATIVE
BASE EXCESS BLDA CALC-SCNC: -0.8 MMOL/L (ref -9.6–2)
BASE EXCESS BLDA CALC-SCNC: 0.1 MMOL/L (ref -9.6–2)
BASE EXCESS BLDA CALC-SCNC: 0.7 MMOL/L (ref -9.6–2)
CA-I BLD-MCNC: 4.6 MG/DL (ref 4.4–5.2)
CA-I BLD-MCNC: 4.7 MG/DL (ref 4.4–5.2)
CA-I BLD-MCNC: 4.7 MG/DL (ref 4.4–5.2)
CREAT SERPL-MCNC: 0.71 MG/DL (ref 0.67–1.17)
GFR SERPL CREATININE-BSD FRML MDRD: >90 ML/MIN/1.73M2
GLUCOSE BLD-MCNC: 124 MG/DL (ref 70–99)
GLUCOSE BLD-MCNC: 162 MG/DL (ref 70–99)
GLUCOSE BLD-MCNC: 169 MG/DL (ref 70–99)
GLUCOSE BLDC GLUCOMTR-MCNC: 114 MG/DL (ref 70–99)
GLUCOSE BLDC GLUCOMTR-MCNC: 221 MG/DL (ref 70–99)
GLUCOSE BLDC GLUCOMTR-MCNC: 241 MG/DL (ref 70–99)
HCO3 BLDA-SCNC: 25 MMOL/L (ref 21–28)
HCO3 BLDA-SCNC: 25 MMOL/L (ref 21–28)
HCO3 BLDA-SCNC: 26 MMOL/L (ref 21–28)
HGB BLD-MCNC: 13.4 G/DL (ref 13.3–17.7)
HGB BLD-MCNC: 13.9 G/DL (ref 13.3–17.7)
HGB BLD-MCNC: 14.2 G/DL (ref 13.3–17.7)
LACTATE BLD-SCNC: 1 MMOL/L
LACTATE BLD-SCNC: 1.2 MMOL/L
LACTATE BLD-SCNC: 1.9 MMOL/L
O2/TOTAL GAS SETTING VFR VENT: 27 %
O2/TOTAL GAS SETTING VFR VENT: 39 %
O2/TOTAL GAS SETTING VFR VENT: 40 %
PCO2 BLDA: 40 MM HG (ref 35–45)
PCO2 BLDA: 41 MM HG (ref 35–45)
PCO2 BLDA: 45 MM HG (ref 35–45)
PH BLDA: 7.36 [PH] (ref 7.35–7.45)
PH BLDA: 7.4 [PH] (ref 7.35–7.45)
PH BLDA: 7.41 [PH] (ref 7.35–7.45)
PO2 BLDA: 111 MM HG (ref 80–105)
PO2 BLDA: 124 MM HG (ref 80–105)
PO2 BLDA: 97 MM HG (ref 80–105)
POTASSIUM BLD-SCNC: 3.9 MMOL/L (ref 3.5–5)
POTASSIUM BLD-SCNC: 4 MMOL/L (ref 3.5–5)
POTASSIUM BLD-SCNC: 4.5 MMOL/L (ref 3.5–5)
RADIOLOGIST FLAGS: ABNORMAL
SODIUM BLD-SCNC: 139 MMOL/L (ref 133–144)
SODIUM BLD-SCNC: 140 MMOL/L (ref 133–144)
SODIUM BLD-SCNC: 140 MMOL/L (ref 133–144)
SPECIMEN EXPIRATION DATE: NORMAL

## 2023-03-16 PROCEDURE — 74018 RADEX ABDOMEN 1 VIEW: CPT | Mod: 26 | Performed by: RADIOLOGY

## 2023-03-16 PROCEDURE — 999N000065 XR ABDOMEN PORT 1 VIEW

## 2023-03-16 PROCEDURE — 38724 REMOVAL OF LYMPH NODES NECK: CPT | Mod: LT | Performed by: OTOLARYNGOLOGY

## 2023-03-16 PROCEDURE — 370N000017 HC ANESTHESIA TECHNICAL FEE, PER MIN: Performed by: OTOLARYNGOLOGY

## 2023-03-16 PROCEDURE — 88331 PATH CONSLTJ SURG 1 BLK 1SPC: CPT | Mod: 26 | Performed by: STUDENT IN AN ORGANIZED HEALTH CARE EDUCATION/TRAINING PROGRAM

## 2023-03-16 PROCEDURE — 07T20ZZ RESECTION OF LEFT NECK LYMPHATIC, OPEN APPROACH: ICD-10-PCS | Performed by: OTOLARYNGOLOGY

## 2023-03-16 PROCEDURE — 0NBV0ZZ EXCISION OF LEFT MANDIBLE, OPEN APPROACH: ICD-10-PCS | Performed by: OTOLARYNGOLOGY

## 2023-03-16 PROCEDURE — 250N000025 HC SEVOFLURANE, PER MIN: Performed by: OTOLARYNGOLOGY

## 2023-03-16 PROCEDURE — 0CDXXZ1 EXTRACTION OF LOWER TOOTH, MULTIPLE, EXTERNAL APPROACH: ICD-10-PCS | Performed by: OTOLARYNGOLOGY

## 2023-03-16 PROCEDURE — 0CR407Z REPLACEMENT OF BUCCAL MUCOSA WITH AUTOLOGOUS TISSUE SUBSTITUTE, OPEN APPROACH: ICD-10-PCS | Performed by: OTOLARYNGOLOGY

## 2023-03-16 PROCEDURE — 86901 BLOOD TYPING SEROLOGIC RH(D): CPT

## 2023-03-16 PROCEDURE — 88332 PATH CONSLTJ SURG EA ADD BLK: CPT | Mod: 26 | Performed by: STUDENT IN AN ORGANIZED HEALTH CARE EDUCATION/TRAINING PROGRAM

## 2023-03-16 PROCEDURE — 200N000002 HC R&B ICU UMMC

## 2023-03-16 PROCEDURE — 258N000003 HC RX IP 258 OP 636

## 2023-03-16 PROCEDURE — 88311 DECALCIFY TISSUE: CPT | Mod: 26 | Performed by: STUDENT IN AN ORGANIZED HEALTH CARE EDUCATION/TRAINING PROGRAM

## 2023-03-16 PROCEDURE — 258N000003 HC RX IP 258 OP 636: Performed by: OTOLARYNGOLOGY

## 2023-03-16 PROCEDURE — 82803 BLOOD GASES ANY COMBINATION: CPT

## 2023-03-16 PROCEDURE — 88307 TISSUE EXAM BY PATHOLOGIST: CPT | Mod: 26 | Performed by: STUDENT IN AN ORGANIZED HEALTH CARE EDUCATION/TRAINING PROGRAM

## 2023-03-16 PROCEDURE — 250N000013 HC RX MED GY IP 250 OP 250 PS 637

## 2023-03-16 PROCEDURE — 0HREX74 REPLACEMENT OF LEFT LOWER ARM SKIN WITH AUTOLOGOUS TISSUE SUBSTITUTE, PARTIAL THICKNESS, EXTERNAL APPROACH: ICD-10-PCS | Performed by: OTOLARYNGOLOGY

## 2023-03-16 PROCEDURE — 258N000003 HC RX IP 258 OP 636: Performed by: STUDENT IN AN ORGANIZED HEALTH CARE EDUCATION/TRAINING PROGRAM

## 2023-03-16 PROCEDURE — 250N000009 HC RX 250

## 2023-03-16 PROCEDURE — 0HBHXZZ EXCISION OF RIGHT UPPER LEG SKIN, EXTERNAL APPROACH: ICD-10-PCS | Performed by: OTOLARYNGOLOGY

## 2023-03-16 PROCEDURE — 0CPYXKZ REMOVAL OF NONAUTOLOGOUS TISSUE SUBSTITUTE FROM MOUTH AND THROAT, EXTERNAL APPROACH: ICD-10-PCS | Performed by: DENTIST

## 2023-03-16 PROCEDURE — 360N000078 HC SURGERY LEVEL 5, PER MIN: Performed by: OTOLARYNGOLOGY

## 2023-03-16 PROCEDURE — 272N000001 HC OR GENERAL SUPPLY STERILE: Performed by: OTOLARYNGOLOGY

## 2023-03-16 PROCEDURE — 36415 COLL VENOUS BLD VENIPUNCTURE: CPT

## 2023-03-16 PROCEDURE — 250N000011 HC RX IP 250 OP 636: Performed by: OTOLARYNGOLOGY

## 2023-03-16 PROCEDURE — 21044 REMOVAL OF JAW BONE LESION: CPT | Mod: 51 | Performed by: OTOLARYNGOLOGY

## 2023-03-16 PROCEDURE — 250N000011 HC RX IP 250 OP 636: Performed by: STUDENT IN AN ORGANIZED HEALTH CARE EDUCATION/TRAINING PROGRAM

## 2023-03-16 PROCEDURE — 82330 ASSAY OF CALCIUM: CPT

## 2023-03-16 PROCEDURE — 40810 EXCISION OF MOUTH LESION: CPT | Mod: 51 | Performed by: OTOLARYNGOLOGY

## 2023-03-16 PROCEDURE — 250N000009 HC RX 250: Performed by: STUDENT IN AN ORGANIZED HEALTH CARE EDUCATION/TRAINING PROGRAM

## 2023-03-16 PROCEDURE — 250N000011 HC RX IP 250 OP 636

## 2023-03-16 PROCEDURE — 88305 TISSUE EXAM BY PATHOLOGIST: CPT | Mod: TC | Performed by: OTOLARYNGOLOGY

## 2023-03-16 PROCEDURE — 0CB40ZZ EXCISION OF BUCCAL MUCOSA, OPEN APPROACH: ICD-10-PCS | Performed by: OTOLARYNGOLOGY

## 2023-03-16 PROCEDURE — 250N000011 HC RX IP 250 OP 636: Performed by: NURSE ANESTHETIST, CERTIFIED REGISTERED

## 2023-03-16 PROCEDURE — 250N000013 HC RX MED GY IP 250 OP 250 PS 637: Performed by: STUDENT IN AN ORGANIZED HEALTH CARE EDUCATION/TRAINING PROGRAM

## 2023-03-16 PROCEDURE — 86850 RBC ANTIBODY SCREEN: CPT

## 2023-03-16 PROCEDURE — 278N000051 HC OR IMPLANT GENERAL: Performed by: OTOLARYNGOLOGY

## 2023-03-16 PROCEDURE — 88305 TISSUE EXAM BY PATHOLOGIST: CPT | Mod: 26 | Performed by: STUDENT IN AN ORGANIZED HEALTH CARE EDUCATION/TRAINING PROGRAM

## 2023-03-16 PROCEDURE — 258N000003 HC RX IP 258 OP 636: Performed by: ANESTHESIOLOGY

## 2023-03-16 PROCEDURE — 82565 ASSAY OF CREATININE: CPT | Performed by: STUDENT IN AN ORGANIZED HEALTH CARE EDUCATION/TRAINING PROGRAM

## 2023-03-16 PROCEDURE — 0JBF0ZZ EXCISION OF LEFT UPPER ARM SUBCUTANEOUS TISSUE AND FASCIA, OPEN APPROACH: ICD-10-PCS | Performed by: OTOLARYNGOLOGY

## 2023-03-16 PROCEDURE — 999N000141 HC STATISTIC PRE-PROCEDURE NURSING ASSESSMENT: Performed by: OTOLARYNGOLOGY

## 2023-03-16 DEVICE — THE GEM MICROVASCULAR ANASTOMOTIC COUPLER DEVICE RINGS ARE MADE OF POLYETHYLENE AND SURGICAL GRADE STAINLESS STEEL PINS. A PROTECTIVE COVER AND JAW ASSEMBLY PROTECT THE RINGS AND ALLOW FOR EASY LOADING ONTO THE ANASTOMOTIC INSTRUMENT. BOTH THE PROTECTIVE COVER AND JAW ASSEMBLY ARE DISPOSABLE. THE GEM MICROVASCULAR ANASTOMOTIC COUPLER DEVICE IS SINGLE-USE AND AVAILABLE IN VARIOUS SIZES
Type: IMPLANTABLE DEVICE | Site: NECK | Status: FUNCTIONAL
Brand: GEM MICROVASCULAR ANASTOMOTIC COUPLER

## 2023-03-16 DEVICE — COOK-SWARTZ DOPPLER FLOW PROBE STANDARD CUFF
Type: IMPLANTABLE DEVICE | Site: NECK | Status: FUNCTIONAL
Brand: COOK-SWARTZ

## 2023-03-16 RX ORDER — SODIUM CHLORIDE, SODIUM LACTATE, POTASSIUM CHLORIDE, CALCIUM CHLORIDE 600; 310; 30; 20 MG/100ML; MG/100ML; MG/100ML; MG/100ML
INJECTION, SOLUTION INTRAVENOUS CONTINUOUS
Status: DISCONTINUED | OUTPATIENT
Start: 2023-03-16 | End: 2023-03-16

## 2023-03-16 RX ORDER — ENOXAPARIN SODIUM 100 MG/ML
40 INJECTION SUBCUTANEOUS EVERY 24 HOURS
Status: DISCONTINUED | OUTPATIENT
Start: 2023-03-17 | End: 2023-03-21 | Stop reason: HOSPADM

## 2023-03-16 RX ORDER — DOPAMINE HYDROCHLORIDE 160 MG/100ML
INJECTION, SOLUTION INTRAVENOUS CONTINUOUS PRN
Status: DISCONTINUED | OUTPATIENT
Start: 2023-03-16 | End: 2023-03-16

## 2023-03-16 RX ORDER — ONDANSETRON 4 MG/1
4 TABLET, ORALLY DISINTEGRATING ORAL EVERY 30 MIN PRN
Status: DISCONTINUED | OUTPATIENT
Start: 2023-03-16 | End: 2023-03-16

## 2023-03-16 RX ORDER — ACETAMINOPHEN 325 MG/1
975 TABLET ORAL ONCE
Status: COMPLETED | OUTPATIENT
Start: 2023-03-16 | End: 2023-03-16

## 2023-03-16 RX ORDER — TAMSULOSIN HYDROCHLORIDE 0.4 MG/1
0.4 CAPSULE ORAL EVERY MORNING
Status: DISCONTINUED | OUTPATIENT
Start: 2023-03-17 | End: 2023-03-17

## 2023-03-16 RX ORDER — ONDANSETRON 2 MG/ML
INJECTION INTRAMUSCULAR; INTRAVENOUS PRN
Status: DISCONTINUED | OUTPATIENT
Start: 2023-03-16 | End: 2023-03-16

## 2023-03-16 RX ORDER — DIPHENHYDRAMINE HCL 12.5MG/5ML
12.5 LIQUID (ML) ORAL EVERY 6 HOURS PRN
Status: DISCONTINUED | OUTPATIENT
Start: 2023-03-16 | End: 2023-03-16

## 2023-03-16 RX ORDER — GINSENG 100 MG
CAPSULE ORAL EVERY 8 HOURS
Status: COMPLETED | OUTPATIENT
Start: 2023-03-16 | End: 2023-03-17

## 2023-03-16 RX ORDER — NALOXONE HYDROCHLORIDE 0.4 MG/ML
0.2 INJECTION, SOLUTION INTRAMUSCULAR; INTRAVENOUS; SUBCUTANEOUS
Status: DISCONTINUED | OUTPATIENT
Start: 2023-03-16 | End: 2023-03-21 | Stop reason: HOSPADM

## 2023-03-16 RX ORDER — HYDROMORPHONE HYDROCHLORIDE 1 MG/ML
0.2 INJECTION, SOLUTION INTRAMUSCULAR; INTRAVENOUS; SUBCUTANEOUS
Status: DISCONTINUED | OUTPATIENT
Start: 2023-03-16 | End: 2023-03-19

## 2023-03-16 RX ORDER — SODIUM CHLORIDE, SODIUM LACTATE, POTASSIUM CHLORIDE, CALCIUM CHLORIDE 600; 310; 30; 20 MG/100ML; MG/100ML; MG/100ML; MG/100ML
INJECTION, SOLUTION INTRAVENOUS CONTINUOUS PRN
Status: DISCONTINUED | OUTPATIENT
Start: 2023-03-16 | End: 2023-03-16

## 2023-03-16 RX ORDER — AMPICILLIN AND SULBACTAM 2; 1 G/1; G/1
3 INJECTION, POWDER, FOR SOLUTION INTRAMUSCULAR; INTRAVENOUS EVERY 6 HOURS
Status: COMPLETED | OUTPATIENT
Start: 2023-03-16 | End: 2023-03-18

## 2023-03-16 RX ORDER — LABETALOL HYDROCHLORIDE 5 MG/ML
10 INJECTION, SOLUTION INTRAVENOUS
Status: DISCONTINUED | OUTPATIENT
Start: 2023-03-16 | End: 2023-03-16

## 2023-03-16 RX ORDER — DIMENHYDRINATE 50 MG
25 TABLET ORAL
COMMUNITY
End: 2023-03-31

## 2023-03-16 RX ORDER — FENTANYL CITRATE 50 UG/ML
50 INJECTION, SOLUTION INTRAMUSCULAR; INTRAVENOUS EVERY 5 MIN PRN
Status: DISCONTINUED | OUTPATIENT
Start: 2023-03-16 | End: 2023-03-16

## 2023-03-16 RX ORDER — PROPOFOL 10 MG/ML
INJECTION, EMULSION INTRAVENOUS PRN
Status: DISCONTINUED | OUTPATIENT
Start: 2023-03-16 | End: 2023-03-16

## 2023-03-16 RX ORDER — NICOTINE POLACRILEX 4 MG
15-30 LOZENGE BUCCAL
Status: DISCONTINUED | OUTPATIENT
Start: 2023-03-16 | End: 2023-03-21 | Stop reason: HOSPADM

## 2023-03-16 RX ORDER — HYDROMORPHONE HCL IN WATER/PF 6 MG/30 ML
0.4 PATIENT CONTROLLED ANALGESIA SYRINGE INTRAVENOUS EVERY 5 MIN PRN
Status: DISCONTINUED | OUTPATIENT
Start: 2023-03-16 | End: 2023-03-16

## 2023-03-16 RX ORDER — LIDOCAINE HYDROCHLORIDE 20 MG/ML
INJECTION, SOLUTION INFILTRATION; PERINEURAL PRN
Status: DISCONTINUED | OUTPATIENT
Start: 2023-03-16 | End: 2023-03-16

## 2023-03-16 RX ORDER — ENOXAPARIN SODIUM 100 MG/ML
40 INJECTION SUBCUTANEOUS EVERY 24 HOURS
Status: DISCONTINUED | OUTPATIENT
Start: 2023-03-17 | End: 2023-03-16

## 2023-03-16 RX ORDER — POLYETHYLENE GLYCOL 3350 17 G/17G
17 POWDER, FOR SOLUTION ORAL DAILY
Status: DISCONTINUED | OUTPATIENT
Start: 2023-03-17 | End: 2023-03-21 | Stop reason: HOSPADM

## 2023-03-16 RX ORDER — ACETAMINOPHEN 325 MG/1
975 TABLET ORAL 3 TIMES DAILY
Status: DISCONTINUED | OUTPATIENT
Start: 2023-03-16 | End: 2023-03-21 | Stop reason: HOSPADM

## 2023-03-16 RX ORDER — ACETAMINOPHEN 325 MG/1
975 TABLET ORAL ONCE
Status: DISCONTINUED | OUTPATIENT
Start: 2023-03-16 | End: 2023-03-16

## 2023-03-16 RX ORDER — MINERAL OIL/HYDROPHIL PETROLAT
OINTMENT (GRAM) TOPICAL EVERY 8 HOURS
Status: DISCONTINUED | OUTPATIENT
Start: 2023-03-17 | End: 2023-03-21 | Stop reason: HOSPADM

## 2023-03-16 RX ORDER — CHLORHEXIDINE GLUCONATE ORAL RINSE 1.2 MG/ML
15 SOLUTION DENTAL EVERY 8 HOURS
Status: DISCONTINUED | OUTPATIENT
Start: 2023-03-17 | End: 2023-03-21 | Stop reason: HOSPADM

## 2023-03-16 RX ORDER — AMPICILLIN AND SULBACTAM 1; .5 G/1; G/1
1.5 INJECTION, POWDER, FOR SOLUTION INTRAMUSCULAR; INTRAVENOUS SEE ADMIN INSTRUCTIONS
Status: DISCONTINUED | OUTPATIENT
Start: 2023-03-16 | End: 2023-03-16

## 2023-03-16 RX ORDER — AMPICILLIN AND SULBACTAM 2; 1 G/1; G/1
3 INJECTION, POWDER, FOR SOLUTION INTRAMUSCULAR; INTRAVENOUS
Status: DISCONTINUED | OUTPATIENT
Start: 2023-03-16 | End: 2023-03-16

## 2023-03-16 RX ORDER — CYCLOSPORINE 0.5 MG/ML
1 EMULSION OPHTHALMIC 2 TIMES DAILY
Status: DISCONTINUED | OUTPATIENT
Start: 2023-03-16 | End: 2023-03-21 | Stop reason: HOSPADM

## 2023-03-16 RX ORDER — SODIUM CHLORIDE, SODIUM LACTATE, POTASSIUM CHLORIDE, CALCIUM CHLORIDE 600; 310; 30; 20 MG/100ML; MG/100ML; MG/100ML; MG/100ML
INJECTION, SOLUTION INTRAVENOUS CONTINUOUS
Status: DISCONTINUED | OUTPATIENT
Start: 2023-03-16 | End: 2023-03-17

## 2023-03-16 RX ORDER — LIDOCAINE 40 MG/G
CREAM TOPICAL
Status: DISCONTINUED | OUTPATIENT
Start: 2023-03-16 | End: 2023-03-21 | Stop reason: HOSPADM

## 2023-03-16 RX ORDER — ATORVASTATIN CALCIUM 40 MG/1
40 TABLET, FILM COATED ORAL EVERY MORNING
Status: DISCONTINUED | OUTPATIENT
Start: 2023-03-17 | End: 2023-03-21 | Stop reason: HOSPADM

## 2023-03-16 RX ORDER — DEXTROSE MONOHYDRATE 25 G/50ML
25-50 INJECTION, SOLUTION INTRAVENOUS
Status: DISCONTINUED | OUTPATIENT
Start: 2023-03-16 | End: 2023-03-21 | Stop reason: HOSPADM

## 2023-03-16 RX ORDER — EPHEDRINE SULFATE 50 MG/ML
INJECTION, SOLUTION INTRAMUSCULAR; INTRAVENOUS; SUBCUTANEOUS PRN
Status: DISCONTINUED | OUTPATIENT
Start: 2023-03-16 | End: 2023-03-16

## 2023-03-16 RX ORDER — ONDANSETRON 2 MG/ML
4 INJECTION INTRAMUSCULAR; INTRAVENOUS EVERY 6 HOURS PRN
Status: DISCONTINUED | OUTPATIENT
Start: 2023-03-16 | End: 2023-03-21 | Stop reason: HOSPADM

## 2023-03-16 RX ORDER — HYDRALAZINE HYDROCHLORIDE 20 MG/ML
2.5-5 INJECTION INTRAMUSCULAR; INTRAVENOUS EVERY 10 MIN PRN
Status: DISCONTINUED | OUTPATIENT
Start: 2023-03-16 | End: 2023-03-16

## 2023-03-16 RX ORDER — LIDOCAINE 40 MG/G
CREAM TOPICAL
Status: DISCONTINUED | OUTPATIENT
Start: 2023-03-16 | End: 2023-03-16

## 2023-03-16 RX ORDER — ATENOLOL 25 MG/1
25 TABLET ORAL EVERY MORNING
Status: DISCONTINUED | OUTPATIENT
Start: 2023-03-17 | End: 2023-03-17

## 2023-03-16 RX ORDER — SENNOSIDES 8.6 MG
8.6 TABLET ORAL DAILY
Status: DISCONTINUED | OUTPATIENT
Start: 2023-03-17 | End: 2023-03-21 | Stop reason: HOSPADM

## 2023-03-16 RX ORDER — ALBUTEROL SULFATE 0.83 MG/ML
2.5 SOLUTION RESPIRATORY (INHALATION) EVERY 4 HOURS PRN
Status: DISCONTINUED | OUTPATIENT
Start: 2023-03-16 | End: 2023-03-16

## 2023-03-16 RX ORDER — DIPHENHYDRAMINE HYDROCHLORIDE 50 MG/ML
12.5 INJECTION INTRAMUSCULAR; INTRAVENOUS EVERY 6 HOURS PRN
Status: DISCONTINUED | OUTPATIENT
Start: 2023-03-16 | End: 2023-03-16

## 2023-03-16 RX ORDER — HYDROMORPHONE HCL IN WATER/PF 6 MG/30 ML
0.2 PATIENT CONTROLLED ANALGESIA SYRINGE INTRAVENOUS EVERY 5 MIN PRN
Status: DISCONTINUED | OUTPATIENT
Start: 2023-03-16 | End: 2023-03-16

## 2023-03-16 RX ORDER — ONDANSETRON 2 MG/ML
4 INJECTION INTRAMUSCULAR; INTRAVENOUS EVERY 30 MIN PRN
Status: DISCONTINUED | OUTPATIENT
Start: 2023-03-16 | End: 2023-03-16

## 2023-03-16 RX ORDER — FENTANYL CITRATE 50 UG/ML
INJECTION, SOLUTION INTRAMUSCULAR; INTRAVENOUS PRN
Status: DISCONTINUED | OUTPATIENT
Start: 2023-03-16 | End: 2023-03-16

## 2023-03-16 RX ORDER — ALPRAZOLAM 0.25 MG
0.25 TABLET ORAL 3 TIMES DAILY PRN
COMMUNITY
End: 2023-03-31

## 2023-03-16 RX ORDER — DEXAMETHASONE SODIUM PHOSPHATE 4 MG/ML
10 INJECTION, SOLUTION INTRA-ARTICULAR; INTRALESIONAL; INTRAMUSCULAR; INTRAVENOUS; SOFT TISSUE ONCE
Status: COMPLETED | OUTPATIENT
Start: 2023-03-16 | End: 2023-03-16

## 2023-03-16 RX ORDER — NALOXONE HYDROCHLORIDE 0.4 MG/ML
0.4 INJECTION, SOLUTION INTRAMUSCULAR; INTRAVENOUS; SUBCUTANEOUS
Status: DISCONTINUED | OUTPATIENT
Start: 2023-03-16 | End: 2023-03-21 | Stop reason: HOSPADM

## 2023-03-16 RX ORDER — OXYCODONE HYDROCHLORIDE 5 MG/1
5 TABLET ORAL EVERY 4 HOURS PRN
Status: DISCONTINUED | OUTPATIENT
Start: 2023-03-16 | End: 2023-03-21 | Stop reason: HOSPADM

## 2023-03-16 RX ORDER — HYDROMORPHONE HCL IN WATER/PF 6 MG/30 ML
0.2 PATIENT CONTROLLED ANALGESIA SYRINGE INTRAVENOUS ONCE
Status: COMPLETED | OUTPATIENT
Start: 2023-03-16 | End: 2023-03-16

## 2023-03-16 RX ORDER — MOXIFLOXACIN 5 MG/ML
1 SOLUTION/ DROPS OPHTHALMIC 2 TIMES DAILY
Status: DISCONTINUED | OUTPATIENT
Start: 2023-03-16 | End: 2023-03-21 | Stop reason: HOSPADM

## 2023-03-16 RX ORDER — FENTANYL CITRATE 50 UG/ML
25 INJECTION, SOLUTION INTRAMUSCULAR; INTRAVENOUS EVERY 5 MIN PRN
Status: DISCONTINUED | OUTPATIENT
Start: 2023-03-16 | End: 2023-03-16

## 2023-03-16 RX ORDER — DEXAMETHASONE SODIUM PHOSPHATE 4 MG/ML
6 INJECTION, SOLUTION INTRA-ARTICULAR; INTRALESIONAL; INTRAMUSCULAR; INTRAVENOUS; SOFT TISSUE EVERY 8 HOURS
Status: DISCONTINUED | OUTPATIENT
Start: 2023-03-16 | End: 2023-03-18 | Stop reason: ALTCHOICE

## 2023-03-16 RX ADMIN — BACITRACIN: 500 OINTMENT TOPICAL at 17:52

## 2023-03-16 RX ADMIN — MOXIFLOXACIN 1 DROP: 5 SOLUTION/ DROPS OPHTHALMIC at 20:36

## 2023-03-16 RX ADMIN — SODIUM CHLORIDE, POTASSIUM CHLORIDE, SODIUM LACTATE AND CALCIUM CHLORIDE: 600; 310; 30; 20 INJECTION, SOLUTION INTRAVENOUS at 17:53

## 2023-03-16 RX ADMIN — SODIUM CHLORIDE, POTASSIUM CHLORIDE, SODIUM LACTATE AND CALCIUM CHLORIDE: 600; 310; 30; 20 INJECTION, SOLUTION INTRAVENOUS at 16:27

## 2023-03-16 RX ADMIN — Medication 10 MG: at 08:41

## 2023-03-16 RX ADMIN — Medication 50 MG: at 07:57

## 2023-03-16 RX ADMIN — ACETAMINOPHEN 975 MG: 325 TABLET ORAL at 05:53

## 2023-03-16 RX ADMIN — CYCLOSPORINE 1 DROP: 0.5 EMULSION OPHTHALMIC at 19:47

## 2023-03-16 RX ADMIN — REMIFENTANIL HYDROCHLORIDE 0.08 MCG/KG/MIN: 1 INJECTION, POWDER, LYOPHILIZED, FOR SOLUTION INTRAVENOUS at 12:40

## 2023-03-16 RX ADMIN — DEXAMETHASONE SODIUM PHOSPHATE 10 MG: 4 INJECTION, SOLUTION INTRAMUSCULAR; INTRAVENOUS at 09:09

## 2023-03-16 RX ADMIN — Medication 5 MG: at 13:48

## 2023-03-16 RX ADMIN — PHENYLEPHRINE HYDROCHLORIDE 200 MCG: 10 INJECTION INTRAVENOUS at 10:30

## 2023-03-16 RX ADMIN — Medication 5 MG: at 08:56

## 2023-03-16 RX ADMIN — DOPAMINE HYDROCHLORIDE 5 MCG/KG/MIN: 160 INJECTION, SOLUTION INTRAVENOUS at 11:09

## 2023-03-16 RX ADMIN — SODIUM CHLORIDE, POTASSIUM CHLORIDE, SODIUM LACTATE AND CALCIUM CHLORIDE: 600; 310; 30; 20 INJECTION, SOLUTION INTRAVENOUS at 08:08

## 2023-03-16 RX ADMIN — Medication 10 MG: at 11:15

## 2023-03-16 RX ADMIN — ACETAMINOPHEN 975 MG: 325 TABLET ORAL at 21:38

## 2023-03-16 RX ADMIN — Medication 5 MG: at 09:33

## 2023-03-16 RX ADMIN — PROPOFOL 150 MG: 10 INJECTION, EMULSION INTRAVENOUS at 07:55

## 2023-03-16 RX ADMIN — MIDAZOLAM 2 MG: 1 INJECTION INTRAMUSCULAR; INTRAVENOUS at 07:35

## 2023-03-16 RX ADMIN — PHENYLEPHRINE HYDROCHLORIDE 100 MCG: 10 INJECTION INTRAVENOUS at 10:52

## 2023-03-16 RX ADMIN — HYDROMORPHONE HYDROCHLORIDE 0.2 MG: 1 INJECTION, SOLUTION INTRAMUSCULAR; INTRAVENOUS; SUBCUTANEOUS at 18:11

## 2023-03-16 RX ADMIN — Medication 5 MG: at 10:17

## 2023-03-16 RX ADMIN — PHENYLEPHRINE HYDROCHLORIDE 100 MCG: 10 INJECTION INTRAVENOUS at 11:11

## 2023-03-16 RX ADMIN — SODIUM CHLORIDE, POTASSIUM CHLORIDE, SODIUM LACTATE AND CALCIUM CHLORIDE: 600; 310; 30; 20 INJECTION, SOLUTION INTRAVENOUS at 07:43

## 2023-03-16 RX ADMIN — Medication 30 MG: at 08:55

## 2023-03-16 RX ADMIN — PHENYLEPHRINE HYDROCHLORIDE 0.5 MCG/KG/MIN: 10 INJECTION INTRAVENOUS at 08:28

## 2023-03-16 RX ADMIN — PHENYLEPHRINE HYDROCHLORIDE 100 MCG: 10 INJECTION INTRAVENOUS at 13:48

## 2023-03-16 RX ADMIN — AMPICILLIN AND SULBACTAM 1.5 G: 1; .5 INJECTION, POWDER, FOR SOLUTION INTRAMUSCULAR; INTRAVENOUS at 12:25

## 2023-03-16 RX ADMIN — MIDAZOLAM 1 MG: 1 INJECTION INTRAMUSCULAR; INTRAVENOUS at 17:12

## 2023-03-16 RX ADMIN — DEXAMETHASONE SODIUM PHOSPHATE 6 MG: 4 INJECTION, SOLUTION INTRA-ARTICULAR; INTRALESIONAL; INTRAMUSCULAR; INTRAVENOUS; SOFT TISSUE at 18:38

## 2023-03-16 RX ADMIN — AMPICILLIN SODIUM AND SULBACTAM SODIUM 3 G: 2; 1 INJECTION, POWDER, FOR SOLUTION INTRAMUSCULAR; INTRAVENOUS at 21:34

## 2023-03-16 RX ADMIN — ONDANSETRON 4 MG: 2 INJECTION INTRAMUSCULAR; INTRAVENOUS at 16:29

## 2023-03-16 RX ADMIN — AMPICILLIN AND SULBACTAM 1.5 G: 1; .5 INJECTION, POWDER, FOR SOLUTION INTRAMUSCULAR; INTRAVENOUS at 14:32

## 2023-03-16 RX ADMIN — FENTANYL CITRATE 250 MCG: 50 INJECTION, SOLUTION INTRAMUSCULAR; INTRAVENOUS at 07:55

## 2023-03-16 RX ADMIN — PHENYLEPHRINE HYDROCHLORIDE 100 MCG: 10 INJECTION INTRAVENOUS at 14:41

## 2023-03-16 RX ADMIN — OXYCODONE HYDROCHLORIDE 5 MG: 5 TABLET ORAL at 21:38

## 2023-03-16 RX ADMIN — AMPICILLIN AND SULBACTAM 1.5 G: 1; .5 INJECTION, POWDER, FOR SOLUTION INTRAMUSCULAR; INTRAVENOUS at 16:30

## 2023-03-16 RX ADMIN — PHENYLEPHRINE HYDROCHLORIDE 100 MCG: 10 INJECTION INTRAVENOUS at 10:25

## 2023-03-16 RX ADMIN — PHENYLEPHRINE HYDROCHLORIDE 150 MCG: 10 INJECTION INTRAVENOUS at 08:09

## 2023-03-16 RX ADMIN — PROPOFOL 30 MG: 10 INJECTION, EMULSION INTRAVENOUS at 08:59

## 2023-03-16 RX ADMIN — Medication 10 MG: at 11:43

## 2023-03-16 RX ADMIN — AMPICILLIN AND SULBACTAM 1.5 G: 1; .5 INJECTION, POWDER, FOR SOLUTION INTRAMUSCULAR; INTRAVENOUS at 10:26

## 2023-03-16 RX ADMIN — HYDROMORPHONE HYDROCHLORIDE 0.2 MG: 0.2 INJECTION, SOLUTION INTRAMUSCULAR; INTRAVENOUS; SUBCUTANEOUS at 20:09

## 2023-03-16 RX ADMIN — REMIFENTANIL HYDROCHLORIDE 0.08 MCG/KG/MIN: 1 INJECTION, POWDER, LYOPHILIZED, FOR SOLUTION INTRAVENOUS at 08:57

## 2023-03-16 RX ADMIN — LIDOCAINE HYDROCHLORIDE 100 MG: 20 INJECTION, SOLUTION INFILTRATION; PERINEURAL at 07:55

## 2023-03-16 RX ADMIN — PHENYLEPHRINE HYDROCHLORIDE 150 MCG: 10 INJECTION INTRAVENOUS at 08:40

## 2023-03-16 RX ADMIN — PHENYLEPHRINE HYDROCHLORIDE 100 MCG: 10 INJECTION INTRAVENOUS at 11:07

## 2023-03-16 RX ADMIN — AMPICILLIN AND SULBACTAM 3 G: 1; .5 INJECTION, POWDER, FOR SOLUTION INTRAMUSCULAR; INTRAVENOUS at 08:26

## 2023-03-16 RX ADMIN — PHENYLEPHRINE HYDROCHLORIDE 100 MCG: 10 INJECTION INTRAVENOUS at 10:19

## 2023-03-16 ASSESSMENT — ACTIVITIES OF DAILY LIVING (ADL)
ADLS_ACUITY_SCORE: 18

## 2023-03-16 NOTE — BRIEF OP NOTE
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) [C06.9]  Post-operative diagnosis Same as pre-operative diagnosis    Procedure: Procedure(s):  wide local excision of buccal mucosa  left marginal mandibulectomy  left modified radical neck dissection  possible tracheostomy placement, nasogastric tube placement  left forearm free flap  split thickness skin graft from right thigh  Surgeon: Surgeon(s) and Role:  Panel 1:     * Nirmal Ballard MD - Primary     * Gio Mcbride MD - Resident - Assisting  Panel 2:     * Vivi Clement MD - Primary     * Chelsea Jara MD  Anesthesia: General   Estimated Blood Loss: Less than 100 ml    Drains: DAVID drain x 3   Specimens:   ID Type Source Tests Collected by Time Destination   1 : Wide Local Excision Left Mandibular alveolus Tissue Other SURGICAL PATHOLOGY EXAM Nirmal Ballard MD 3/16/2023  9:26 AM    2 : Marginal Mandibulectomy Tissue Other SURGICAL PATHOLOGY EXAM Nirmal Ballard MD 3/16/2023 10:04 AM    3 : Medial Reresection Tissue Other SURGICAL PATHOLOGY EXAM Nirmal Ballard MD 3/16/2023 10:09 AM    4 : Medial Margin #2 Tissue Other SURGICAL PATHOLOGY EXAM Nirmal Ballard MD 3/16/2023 10:10 AM    5 : Left 1B Tissue Other SURGICAL PATHOLOGY EXAM Nirmal Ballard MD 3/16/2023 10:36 AM    6 : Left 2A Tissue Other SURGICAL PATHOLOGY EXAM Nirmal Ballrad MD 3/16/2023 11:10 AM    7 : Left 2B Tissue Other SURGICAL PATHOLOGY EXAM Nirmal Ballard MD 3/16/2023 11:11 AM    8 : Left 3 Tissue Other SURGICAL PATHOLOGY EXAM Nirmal Ballard MD 3/16/2023 11:11 AM    9 : Left 4 Tissue Other SURGICAL PATHOLOGY EXAM Nirmal Ballard MD 3/16/2023 11:15 AM      Findings: SCC along left buccal mucosa and draping over alveolus to inner table of mandible. Marginal mandibulectomy to left  Enlarged left 1b nodes. Dental implant removed.  Extraction of teeth 20,21 and 18. Removal of dental implant by OMFS. Left ulnar forearm free flap.   Complications: None.  Implants: * No implants in log *     ENT Flap Cares:    HEENT:  - Nurse: Q1 hr flap checks per RN x first 24 hours; Q2 hr flap checks per RN x 48 hours; Q4 hr RN checks until discharge. Standard ENT resident flap checks   - Local wound cares to neck (bacitracin QID x 24 hours followed by aquaphor QID). Head in neutral position, HOB at 30 degrees, NO straps around neck, RT@ 72  - Peridex QID  - Red Cole catheter only to suction mouth Q8H (do not cut tip)  - DAVID drain cares   - Decadron q8h x 3 doses     Neuro: pain control per SICU  CV/Heme: Keep MAP > 60, SBP > 90 (preferrably > 110)  - Please call ENT on-call before giving fluid bolus  - NO vasopressors  - holding PTA atenolol and lisinopril pending SBP   - PTA statin   Respiratory: supplemental oxygen as needed   GI: NPO, NG tube placed.  AXR to confirm placement. Anticipate starting TF tomorrow, nutrition consulted    : PTA flomax, holding pending SBP. Can use doxasosin if urinary retention  Endocrine: TSH, albumin, prealbumin on POD#1  ID: Unasyn x 48 hours  Extremities: Q1H CMS checks left arm donor site. Wound vac to stay in place for 5 days. For RFFF, wound vac taken down on day 5 and then daily dressing changes (Xeroform, telfa, kerlix, ace).  STSG donor site (right thigh): Calcium alginate and tegaderm initially. Change as needed. Once drainage stops, cover with tegaderm. If not irritated, can leave open to air and apply aquaphor.  PPX: lovenox   Consults:  PT/OT, Nutrition, PLC (TF)

## 2023-03-16 NOTE — OP NOTE
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) [C06.9]  Post-operative diagnosis              Same as pre-operative diagnosis     Procedure:           wide local excision of buccal mucosa  left marginal mandibulectomy  left modified radical neck dissection  possible tracheostomy placement, nasogastric tube placement  left forearm free flap  split thickness skin graft from right thigh  Surgeon:             Surgeon(s) and Role:  Panel 1:     * Nirmal Ballard MD - Primary     * Gio Mcbride MD - Resident - Assisting  Panel 2:     * Vivi Clement MD - Primary     * Chelsea Jara MD  Anesthesia:         General   Estimated Blood Loss: Less than 100 ml     Drains: DAVID drain x 3   Specimens:           ID Type Source Tests Collected by Time Destination   1 : Wide Local Excision Left Mandibular alveolus Tissue Other SURGICAL PATHOLOGY EXAM Nirmal Ballard MD 3/16/2023  9:26 AM     2 : Marginal Mandibulectomy Tissue Other SURGICAL PATHOLOGY EXAM Nirmal Ballard MD 3/16/2023 10:04 AM     3 : Medial Reresection Tissue Other SURGICAL PATHOLOGY EXAM Nirmal Ballard MD 3/16/2023 10:09 AM     4 : Medial Margin #2 Tissue Other SURGICAL PATHOLOGY EXAM Nirmal Ballard MD 3/16/2023 10:10 AM     5 : Left 1B Tissue Other SURGICAL PATHOLOGY EXAM Nirmal Ballard MD 3/16/2023 10:36 AM     6 : Left 2A Tissue Other SURGICAL PATHOLOGY EXAM Nirmal Ballard MD 3/16/2023 11:10 AM     7 : Left 2B Tissue Other SURGICAL PATHOLOGY EXAM Nirmal Ballard MD 3/16/2023 11:11 AM     8 : Left 3 Tissue Other SURGICAL PATHOLOGY EXAM Nirmal Ballard MD 3/16/2023 11:11 AM     9 : Left 4 Tissue Other SURGICAL PATHOLOGY EXAM Nirmal Ballard MD 3/16/2023 11:15 AM        Findings: SCC along left buccal mucosa and draping over alveolus to inner table of mandible, final defect 6cm AP x 5cm supero-inferior. Marginal mandibulectomy on  left posterior mandible. Enlarged left 1b nodes. Dental implant removed. Extraction of teeth 20,21 and 18. Removal of dental implant by OMFS. Left ulnar forearm free flap.   Complications:   None.  Implants:             * No implants in log *       INDICATIONS:  This patient is  73 year old  with a history of lichen planus along the buccal mucosa for 15 years who was found to have SCC along that site after a biopsy before dental implanation. He underwent complete oncologic work up and was discussed at Head and Neck Multidisciplinary Tumor Board where the above stated procedure was recommended. This was discussed at length with the patient, as well as the alternatives, with the risks and benefits of each. After consideration and understanding they consented to proceeding.     DESCRIPTION OF PROCEDURE: The patient is brought back the operating room by the anesthesia team and induced into point of anesthesia and intubated. The patient was prepped and draped in the usual fashion and the incision was marked and injected with plain epinephrine.  A timeout was performed identify the patient, procedure, and all operating room staff are in agreement. We began with oral examination to evaluate the current extent of tumor involvement. Tooth #18, 20, and 21 were involved in the planned resection and extracted from the mandible after release of the dental ligaments. These were passed off to pathology. The dental implant in position #19 required the assistance of OMFS for removal, please refer to their note for full details. This was also passed off for pathology. A monopolar cautery was then used to excise the tumor with a 1cm cuff of healthy appearing mucosa around the tumor. The incision extended posterior to position #21 and along the lingual aspect of the mandible to the myohyoid line. The specimen was released from the mandible with a #9 elevator and removed en bloc. This was brought down to pathology by Dr. Ballard for  analysis. Next an oscillating saw was used to perform a marginal mandibulectomy. The anterior cut was made just behind the lateral incisor and the posterior cut just before the angle of the mandible. The bone was removed en bloc and sent off to pathology. A revision margin of the medial margin was sharply excised after report of this margin on the frozen evaluation of the primary tumor resection was positive for invasive SCC. An additional margin medial to this was then sent for permanent pathology. Hemostasis was achieved with bipolar cautery and we then proceeded with the left neck dissection.      A 15 blade was used to incise the skin through the subcutaneous tissue and platysma along a standard neck dissection incision.  Subplatysmal flaps were raised superiorly and inferiorly from the mandible to the clavicle.  The marginal mandibular branch was identified and dissected out until it reached the mandible.  Level 1B was dissected from the anterior belly of the digastric and brought in a medial to lateral fashion as the mylohyoid was then retracted.  The submandibular ganglion was clipped and divided and the submandibular duct was also identified, clipped, and divided. We then divided the facial artery and dissected it from the fascial tissue.  Care was taken not to injure the hypoglossal nerve as we removed the rest of level 1B. This tissue was then sent this off for permanent pathology.  The fascia over the SCM was then incised with monopolar cautery and dissection continued until the posterior portion of the of the SCM was identified and then our dissection continued on the floor of the neck laterally to medially.  The spinal accessory nerve was found and dissected up until it traced beneath the digastric muscle.  We then dissected off levels 2A, 3, and 4 from the internal jugular vein and carotid artery and sent the specimens for permanent pathology. Lastly the skylar tissue from level 2B was removed using a  Kim scissors, taking care to protect CN XI. We then obtained hemostasis with bipolar cautery.  There was no evidence of chyle leak after multiple valsalva maneuvers.   Hemostasis was obtained with bipolar cautery.     The patient was then turned over to Dr. Clement for reconstruction with microvascular anastomosis, please refer to her dictation for details.       Dr. Ballard was present and participatory for the entirety of the case    Winifred Mcbride MD   ENT

## 2023-03-16 NOTE — ANESTHESIA CARE TRANSFER NOTE
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    Anesthesia Type:   General     Note:    Oropharynx: spontaneously breathing  Level of Consciousness: awake  Oxygen Supplementation: face mask    Independent Airway: airway patency satisfactory and stable  Dentition: dentition unchanged  Vital Signs Stable: post-procedure vital signs reviewed and stable  Report to RN Given: handoff report given  Patient transferred to: ICU    ICU Handoff: Call for PAUSE to initiate/utilize ICU HANDOFF, Identified Patient, Identified Responsible Provider, Reviewed the Pertinent Medical History, Discussed Surgical Course, Reviewed Intra-OP Anesthesia Management and Issues during Anesthesia, Set Expectations for Post Procedure Period and Allowed Opportunity for Questions and Acknowledgement of Understanding      Vitals:  Vitals Value Taken Time   BP     Temp     Pulse 80 03/16/23 1724   Resp 12 03/16/23 1724   SpO2 98 % 03/16/23 1724   Vitals shown include unvalidated device data.    Electronically Signed By: SILVIANO Thompson CRNA  March 16, 2023  5:24 PM

## 2023-03-16 NOTE — CONSULTS
SURGICAL ICU ADMISSION NOTE  3/16/2023    PRIMARY TEAM: ENT  PRIMARY PHYSICIAN: Nirmal Ballard MD    REASON FOR CRITICAL CARE ADMISSION: s/p left forearm free flap recon s/p excision of L cheek mass, neck discetion  CONSULTING PHYSICIAN: Nirmal Ballard MD    ASSESSMENT: Jenaro Kerr is a 72 y/o male with a history of HTN, HLFD, lichen planus which lead to SCC s/p wide local excision of buccal mucosa; left marginal mandibulectomy; left modified radical neck dissection; nasogastric tube placement; left forearm free flap; split thickness skin graft from right thigh to treat SCC of left buccal mucosa.       Intra-op totals:  EBL 100mL    PLAN:  Neuro/ pain/ sedation:  # Acute postoperative pain  # Chronic pain, arthritis  # Hx anxiety  -Monitor neurological status. Notify the provider for any acute changes in exam.  -Tylenol for pain. PRN oxycodone for breakthrough pain, Dilaudid  - Hold PTA Xanax  - No indication for sedation.    Pulmonary care:   # Tobacco use  -Supplemental oxygen to keep saturation above 92 %.  - Incentive spirometer every 15- 30 minutes when awake. Pulmonary toilet.    Cardiovascular:    # Palpitations  # HTN  # Smoking  - Hold PTA lisinopril, atorvastatin, atenolol to start in AM  - Monitor hemodynamic status. Goal MAPs > 60     GI care:   - ADAT on POD1  - ENT to reposition NGT     Fluids/ Electrolytes/ Nutrition:   -LR for IV fluid hydration 100mL/hr  -ICU electrolyte replacement protocol  -No indication for parenteral nutrition.  -Nutrition consulted. Appreciate recs    Renal/ Fluid Balance:    #Urinary retention  - Hold PTA flomax   -Urine output monitor  -Will continue to monitor intake and output. Sol in place    Endocrine:    # Postoperative stress and steroid induced hyperglycemia  - Dexamethasone per ENT   - sliding scale insulin     ID/ Antibiotics:  -Amara op prophylactic abx - Unasyn x 48 hrs      Heme:     -Hemoglobin stable.      Prophylaxis:    -Mechanical prophylaxis for  DVT.   - Chemoprophylaxis in the AM     MSK:    # Arthritis of knees  # s/p L forearm free flap  -PT and OT consulted. Appreciate recs.     Lines/ tubes/ drains:  -PIV x 3  -R radial arterial line  -NG tube  -Sol  - DAVID x 3      Disposition:  -Surgical ICU for Q1H free flap checks    Patient seen, findings and plan discussed with surgical ICU staff.    Julian Perdue MD  General Surgery PGY 2 Resident      - - - - - - - - - - - - - - - - - - - - - - - - - - - - - - - - - - - - - - - - - - - - - - - - - - - - - - - - - - - - - - - - - - - - - - - -     HISTORY PRESENTING ILLNESS: Jenaro Kerr is a 74 y/o male s/p wide local excision of buccal mucosa; left marginal mandibulectomy; left modified radical neck dissection, nasogastric tube placement; left forearm free flap; split thickness skin graft from right thigh to treat SCC of left buccal mucosa.    REVIEW OF SYSTEMS: 10 point ROS neg other than the symptoms noted above in the HPI.      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  HTN  HLD  SURGICAL HISTORY:    has a past surgical history that includes Eye surgery (2019); hernia repair; ANESTH,EAR SURGERY; and Leg Surgery (Right).    SOCIAL HISTORY:    reports that he quit smoking about 28 years ago. His smoking use included cigarettes. He has a 23.00 pack-year smoking history. He has never used smokeless tobacco. He reports current alcohol use. He reports that he does not use drugs.    FAMILY HISTORY: No bleeding/clotting disorders nor problems with anesthesia.     ALLERGIES:      Allergies   Allergen Reactions     Erythromycin GI Disturbance     Other reaction(s): Gastrointestinal       MEDICATIONS:  No current facility-administered medications on file prior to encounter.  ALPRAZolam (XANAX) 0.25 MG tablet, Take 0.25 mg by mouth 3 times daily as needed for anxiety  atenolol (TENORMIN) 25 MG tablet, Take 25 mg by  mouth every morning  atorvastatin (LIPITOR) 80 MG tablet, Take 40 mg by mouth every morning  cycloSPORINE (RESTASIS) 0.05 % ophthalmic emulsion, two times a day.  dimenhyDRINATE (DRAMAMINE) 50 MG tablet, Take 25 mg by mouth nightly as needed for other (for anxiety)  lisinopril (ZESTRIL) 30 MG tablet, Take 30 mg by mouth every morning  moxifloxacin (VIGAMOX) 0.5 % ophthalmic solution, 1 drop 2 times daily  multivitamin (CENTRUM SILVER) tablet, every morning  mupirocin (BACTROBAN) 2 % external ointment,   tamsulosin (FLOMAX) 0.4 MG capsule, Take 0.4 mg by mouth every morning        PHYSICAL EXAMINATION:  Temp:  [98.4  F (36.9  C)] 98.4  F (36.9  C)  Pulse:  [60] 60  Resp:  [18] 18  BP: (140)/(92) 140/92  SpO2:  [97 %] 97 %      Gen: no acute distress, resting comfortably in bed  HEENT: Atraumatic, normocephalic; NGT in R nostril; Left neck incision c/d/i with dermabond  Neuro: Alert and oriented. No gross neurologic deficits   Pulm: nonlabored breathing on room air, no cough  CV: RRR by radial pulse, noncyanotic   ABD:soft, non tender, nondistended  MSK:  Normal active range of motion, no edema  Skin: Warm, dry, no rashes or abrasions on exposed skin  Psych: Normal affect, cooperative      LABS: Reviewed.   Arterial Blood Gases   Recent Labs   Lab 03/16/23  1038   PH 7.41   PCO2 40   PO2 97   HCO3 26     Complete Blood Count   Recent Labs   Lab 03/16/23  1038   HGB 13.4     Basic Metabolic Panel  Recent Labs   Lab 03/16/23  1038 03/16/23  0650     --    POTASSIUM 4.5  --    * 114*     Liver Function Tests  No lab results found in last 7 days.  Pancreatic Enzymes  No lab results found in last 7 days.  Coagulation Profile  No lab results found in last 7 days.  Lactate  Invalid input(s): LACTATE    IMAGING:  No results found for this or any previous visit (from the past 24 hour(s)).

## 2023-03-16 NOTE — PROCEDURES
FS was paged to assist operating team with removal of dental implant (appeared to be in the #19 position) in the right mandible.     Discussed case with my staff Dr. Quinones who was not present in the OR for the procedure.     Procedure: deep implant removal right mandible    Rongeur forcep used to torque out healing abutment from implant. Healing abutment removed. 1.6 mm fissure bur on surgical handpiece under copious irrigation used to create a trough around the anterior, buccal and posterior aspects of the dental implant. Implant was luxated used straight dental elevator and delivered using rongeur forceps without complication.     Case was turned back over to Dr. Ballard and team.     Jesus Reyes DDS  [facility]

## 2023-03-16 NOTE — ANESTHESIA PROCEDURE NOTES
Arterial Line Procedure Note    Pre-Procedure   Staff -        Anesthesiologist:  Richard Stevenson MD       Resident/Fellow: Valente Patiño MD       Performed By: resident and with residents       Procedure performed by resident/fellow/CRNA in presence of a teaching physician.         Location: OR       Pre-Anesthestic Checklist: patient identified, IV checked, risks and benefits discussed, informed consent, monitors and equipment checked, pre-op evaluation and at physician/surgeon's request  Timeout:       Correct Patient: Yes        Correct Procedure: Yes        Correct Site: Yes        Correct Position: Yes   Line Placement:   This line was placed Post Induction  Procedure   Procedure: arterial line and elective       Diagnosis: Blood Pressure Monitoring and/or Frequent Blood Draws       Laterality: right       Insertion Site: radial.  Sterile Prep        Standard elements of sterile barrier followed       Skin prep: Chloraprep  Insertion/Injection        Technique: Milan's test completed, Seldinger Technique and ultrasound guided        1. Ultrasound was used to evaluate the access site.       2. Artery evaluated via ultrasound for patency/adequacy.       3. Using real-time ultrasound the needle/catheter was observed entering the artery/vein.       Catheter Type/Size: 20 G, 1.75 in/4.5 cm quick cath (integral wire)  Narrative        Tegaderm dressing used.       Complications: None apparent,        Arterial waveform: Yes        IBP within 10% of NIBP: Yes   Comments:  Routine arterial line placement without complications.

## 2023-03-16 NOTE — ANESTHESIA PROCEDURE NOTES
Airway       Patient location during procedure: OR       Procedure Start/Stop Times: 3/16/2023 8:00 AM  Staff -        Anesthesiologist:  Richard Stevenson MD       Resident/Fellow: Valente Patiño MD       Performed By: resident and with residents       Procedure performed by resident/fellow/CRNA in presence of a teaching physician.    Consent for Airway        Urgency: elective  Indications and Patient Condition       Indications for airway management: kenny-procedural       Induction type:intravenous       Mask difficulty assessment: 1 - vent by mask    Final Airway Details       Final airway type: endotracheal airway       Successful airway: ETT - single, Oral and Reinforced tube  Endotracheal Airway Details        ETT size (mm): 6.5       Cuffed: yes       Successful intubation technique: direct laryngoscopy       DL Blade Type: MAC 3       Grade View of Cords: 2       Adjucts: stylet and bougie       Position: Right       Measured from: lips       Secured at (cm): 21       Bite block used: None    Post intubation assessment        Placement verified by: capnometry, equal breath sounds and chest rise        Number of attempts at approach: 2       Number of other approaches attempted: 0       Secured with: pink tape (Surgeons sutured in after taping.)       Ease of procedure: easy       Dentition: Intact    Medication(s) Administered   Medication Administration Time: 3/16/2023 8:00 AM    Additional Comments       Routine intubation without complications. With first attempt, we had good view of vocal cords, but could not get reinforced ETT to pass through cords. So successfully placed ETT with bougie and then slidding ETT over bougie, requiring some twisting of the ETT for it to pass.

## 2023-03-17 ENCOUNTER — APPOINTMENT (OUTPATIENT)
Dept: OCCUPATIONAL THERAPY | Facility: CLINIC | Age: 73
DRG: 141 | End: 2023-03-17
Attending: STUDENT IN AN ORGANIZED HEALTH CARE EDUCATION/TRAINING PROGRAM
Payer: COMMERCIAL

## 2023-03-17 LAB
ALBUMIN SERPL BCG-MCNC: 3.4 G/DL (ref 3.5–5.2)
ANION GAP SERPL CALCULATED.3IONS-SCNC: 12 MMOL/L (ref 7–15)
BUN SERPL-MCNC: 13.4 MG/DL (ref 8–23)
CALCIUM SERPL-MCNC: 8.5 MG/DL (ref 8.8–10.2)
CHLORIDE SERPL-SCNC: 108 MMOL/L (ref 98–107)
CREAT SERPL-MCNC: 0.73 MG/DL (ref 0.67–1.17)
DEPRECATED HCO3 PLAS-SCNC: 21 MMOL/L (ref 22–29)
ERYTHROCYTE [DISTWIDTH] IN BLOOD BY AUTOMATED COUNT: 12.6 % (ref 10–15)
GFR SERPL CREATININE-BSD FRML MDRD: >90 ML/MIN/1.73M2
GLUCOSE BLDC GLUCOMTR-MCNC: 117 MG/DL (ref 70–99)
GLUCOSE BLDC GLUCOMTR-MCNC: 132 MG/DL (ref 70–99)
GLUCOSE BLDC GLUCOMTR-MCNC: 134 MG/DL (ref 70–99)
GLUCOSE BLDC GLUCOMTR-MCNC: 139 MG/DL (ref 70–99)
GLUCOSE BLDC GLUCOMTR-MCNC: 145 MG/DL (ref 70–99)
GLUCOSE BLDC GLUCOMTR-MCNC: 150 MG/DL (ref 70–99)
GLUCOSE SERPL-MCNC: 146 MG/DL (ref 70–99)
HCT VFR BLD AUTO: 37.3 % (ref 40–53)
HGB BLD-MCNC: 12.6 G/DL (ref 13.3–17.7)
MAGNESIUM SERPL-MCNC: 1.7 MG/DL (ref 1.7–2.3)
MCH RBC QN AUTO: 30.7 PG (ref 26.5–33)
MCHC RBC AUTO-ENTMCNC: 33.8 G/DL (ref 31.5–36.5)
MCV RBC AUTO: 91 FL (ref 78–100)
PHOSPHATE SERPL-MCNC: 3 MG/DL (ref 2.5–4.5)
PLATELET # BLD AUTO: 189 10E3/UL (ref 150–450)
POTASSIUM SERPL-SCNC: 4.4 MMOL/L (ref 3.4–5.3)
PREALB SERPL IA-MCNC: 21 MG/DL (ref 15–45)
RBC # BLD AUTO: 4.1 10E6/UL (ref 4.4–5.9)
SODIUM SERPL-SCNC: 141 MMOL/L (ref 136–145)
TSH SERPL DL<=0.005 MIU/L-ACNC: 0.29 UIU/ML (ref 0.3–4.2)
WBC # BLD AUTO: 19.5 10E3/UL (ref 4–11)

## 2023-03-17 PROCEDURE — 250N000013 HC RX MED GY IP 250 OP 250 PS 637: Performed by: STUDENT IN AN ORGANIZED HEALTH CARE EDUCATION/TRAINING PROGRAM

## 2023-03-17 PROCEDURE — 250N000012 HC RX MED GY IP 250 OP 636 PS 637

## 2023-03-17 PROCEDURE — 80069 RENAL FUNCTION PANEL: CPT | Performed by: STUDENT IN AN ORGANIZED HEALTH CARE EDUCATION/TRAINING PROGRAM

## 2023-03-17 PROCEDURE — 85014 HEMATOCRIT: CPT | Performed by: STUDENT IN AN ORGANIZED HEALTH CARE EDUCATION/TRAINING PROGRAM

## 2023-03-17 PROCEDURE — 97166 OT EVAL MOD COMPLEX 45 MIN: CPT | Mod: GO

## 2023-03-17 PROCEDURE — 250N000011 HC RX IP 250 OP 636: Performed by: OTOLARYNGOLOGY

## 2023-03-17 PROCEDURE — 84134 ASSAY OF PREALBUMIN: CPT | Performed by: STUDENT IN AN ORGANIZED HEALTH CARE EDUCATION/TRAINING PROGRAM

## 2023-03-17 PROCEDURE — 250N000011 HC RX IP 250 OP 636: Performed by: STUDENT IN AN ORGANIZED HEALTH CARE EDUCATION/TRAINING PROGRAM

## 2023-03-17 PROCEDURE — 200N000002 HC R&B ICU UMMC

## 2023-03-17 PROCEDURE — 999N000147 HC STATISTIC PT IP EVAL DEFER

## 2023-03-17 PROCEDURE — 84443 ASSAY THYROID STIM HORMONE: CPT | Performed by: STUDENT IN AN ORGANIZED HEALTH CARE EDUCATION/TRAINING PROGRAM

## 2023-03-17 PROCEDURE — 97110 THERAPEUTIC EXERCISES: CPT | Mod: GO

## 2023-03-17 PROCEDURE — 97530 THERAPEUTIC ACTIVITIES: CPT | Mod: GO

## 2023-03-17 PROCEDURE — 250N000013 HC RX MED GY IP 250 OP 250 PS 637: Performed by: OTOLARYNGOLOGY

## 2023-03-17 PROCEDURE — 258N000003 HC RX IP 258 OP 636: Performed by: STUDENT IN AN ORGANIZED HEALTH CARE EDUCATION/TRAINING PROGRAM

## 2023-03-17 PROCEDURE — 99233 SBSQ HOSP IP/OBS HIGH 50: CPT | Mod: 24 | Performed by: SURGERY

## 2023-03-17 PROCEDURE — 83735 ASSAY OF MAGNESIUM: CPT | Performed by: STUDENT IN AN ORGANIZED HEALTH CARE EDUCATION/TRAINING PROGRAM

## 2023-03-17 RX ORDER — DEXTROSE MONOHYDRATE 100 MG/ML
INJECTION, SOLUTION INTRAVENOUS CONTINUOUS PRN
Status: DISCONTINUED | OUTPATIENT
Start: 2023-03-17 | End: 2023-03-21 | Stop reason: HOSPADM

## 2023-03-17 RX ORDER — MAGNESIUM SULFATE HEPTAHYDRATE 40 MG/ML
2 INJECTION, SOLUTION INTRAVENOUS ONCE
Status: COMPLETED | OUTPATIENT
Start: 2023-03-17 | End: 2023-03-17

## 2023-03-17 RX ORDER — HYDRALAZINE HYDROCHLORIDE 20 MG/ML
10 INJECTION INTRAMUSCULAR; INTRAVENOUS EVERY 6 HOURS PRN
Status: DISCONTINUED | OUTPATIENT
Start: 2023-03-17 | End: 2023-03-21 | Stop reason: HOSPADM

## 2023-03-17 RX ORDER — DOXAZOSIN 1 MG/1
1 TABLET ORAL DAILY
Status: DISCONTINUED | OUTPATIENT
Start: 2023-03-17 | End: 2023-03-21 | Stop reason: HOSPADM

## 2023-03-17 RX ADMIN — ENOXAPARIN SODIUM 40 MG: 40 INJECTION SUBCUTANEOUS at 07:53

## 2023-03-17 RX ADMIN — LISINOPRIL 30 MG: 20 TABLET ORAL at 07:48

## 2023-03-17 RX ADMIN — ACETAMINOPHEN 975 MG: 325 TABLET ORAL at 07:49

## 2023-03-17 RX ADMIN — CYCLOSPORINE 1 DROP: 0.5 EMULSION OPHTHALMIC at 07:46

## 2023-03-17 RX ADMIN — CHLORHEXIDINE GLUCONATE 0.12% ORAL RINSE 15 ML: 1.2 LIQUID ORAL at 00:16

## 2023-03-17 RX ADMIN — ATORVASTATIN CALCIUM 40 MG: 40 TABLET, FILM COATED ORAL at 07:48

## 2023-03-17 RX ADMIN — AMPICILLIN SODIUM AND SULBACTAM SODIUM 3 G: 2; 1 INJECTION, POWDER, FOR SOLUTION INTRAMUSCULAR; INTRAVENOUS at 04:01

## 2023-03-17 RX ADMIN — CYCLOSPORINE 1 DROP: 0.5 EMULSION OPHTHALMIC at 21:12

## 2023-03-17 RX ADMIN — AMPICILLIN SODIUM AND SULBACTAM SODIUM 3 G: 2; 1 INJECTION, POWDER, FOR SOLUTION INTRAMUSCULAR; INTRAVENOUS at 21:11

## 2023-03-17 RX ADMIN — WHITE PETROLATUM: 1.75 OINTMENT TOPICAL at 14:11

## 2023-03-17 RX ADMIN — DEXAMETHASONE SODIUM PHOSPHATE 6 MG: 4 INJECTION, SOLUTION INTRA-ARTICULAR; INTRALESIONAL; INTRAMUSCULAR; INTRAVENOUS; SOFT TISSUE at 09:02

## 2023-03-17 RX ADMIN — Medication 6.25 MG: at 21:14

## 2023-03-17 RX ADMIN — INSULIN ASPART 1 UNITS: 100 INJECTION, SOLUTION INTRAVENOUS; SUBCUTANEOUS at 16:11

## 2023-03-17 RX ADMIN — SODIUM CHLORIDE, POTASSIUM CHLORIDE, SODIUM LACTATE AND CALCIUM CHLORIDE: 600; 310; 30; 20 INJECTION, SOLUTION INTRAVENOUS at 04:00

## 2023-03-17 RX ADMIN — AMPICILLIN SODIUM AND SULBACTAM SODIUM 3 G: 2; 1 INJECTION, POWDER, FOR SOLUTION INTRAMUSCULAR; INTRAVENOUS at 09:07

## 2023-03-17 RX ADMIN — ACETAMINOPHEN 975 MG: 325 TABLET ORAL at 21:11

## 2023-03-17 RX ADMIN — BACITRACIN: 500 OINTMENT TOPICAL at 00:17

## 2023-03-17 RX ADMIN — POLYETHYLENE GLYCOL 3350 17 G: 17 POWDER, FOR SOLUTION ORAL at 07:48

## 2023-03-17 RX ADMIN — MOXIFLOXACIN 1 DROP: 5 SOLUTION/ DROPS OPHTHALMIC at 08:00

## 2023-03-17 RX ADMIN — OXYCODONE HYDROCHLORIDE 5 MG: 5 TABLET ORAL at 02:10

## 2023-03-17 RX ADMIN — MAGNESIUM SULFATE IN WATER 2 G: 40 INJECTION, SOLUTION INTRAVENOUS at 05:57

## 2023-03-17 RX ADMIN — WHITE PETROLATUM: 1.75 OINTMENT TOPICAL at 21:42

## 2023-03-17 RX ADMIN — DOXAZOSIN 1 MG: 1 TABLET ORAL at 09:07

## 2023-03-17 RX ADMIN — Medication 6.25 MG: at 09:02

## 2023-03-17 RX ADMIN — AMPICILLIN SODIUM AND SULBACTAM SODIUM 3 G: 2; 1 INJECTION, POWDER, FOR SOLUTION INTRAMUSCULAR; INTRAVENOUS at 16:06

## 2023-03-17 RX ADMIN — OXYCODONE HYDROCHLORIDE 5 MG: 5 TABLET ORAL at 16:13

## 2023-03-17 RX ADMIN — INSULIN ASPART 1 UNITS: 100 INJECTION, SOLUTION INTRAVENOUS; SUBCUTANEOUS at 21:37

## 2023-03-17 RX ADMIN — SENNOSIDES 8.6 MG: 8.6 TABLET, FILM COATED ORAL at 07:49

## 2023-03-17 RX ADMIN — BACITRACIN: 500 OINTMENT TOPICAL at 07:55

## 2023-03-17 RX ADMIN — CHLORHEXIDINE GLUCONATE 0.12% ORAL RINSE 15 ML: 1.2 LIQUID ORAL at 16:03

## 2023-03-17 RX ADMIN — OXYCODONE HYDROCHLORIDE 5 MG: 5 TABLET ORAL at 07:49

## 2023-03-17 RX ADMIN — ACETAMINOPHEN 975 MG: 325 TABLET ORAL at 14:16

## 2023-03-17 RX ADMIN — DEXAMETHASONE SODIUM PHOSPHATE 6 MG: 4 INJECTION, SOLUTION INTRA-ARTICULAR; INTRALESIONAL; INTRAMUSCULAR; INTRAVENOUS; SOFT TISSUE at 02:06

## 2023-03-17 RX ADMIN — CHLORHEXIDINE GLUCONATE 0.12% ORAL RINSE 15 ML: 1.2 LIQUID ORAL at 07:47

## 2023-03-17 RX ADMIN — DEXAMETHASONE SODIUM PHOSPHATE 6 MG: 4 INJECTION, SOLUTION INTRA-ARTICULAR; INTRALESIONAL; INTRAMUSCULAR; INTRAVENOUS; SOFT TISSUE at 17:35

## 2023-03-17 RX ADMIN — BACITRACIN: 500 OINTMENT TOPICAL at 16:09

## 2023-03-17 RX ADMIN — MOXIFLOXACIN 1 DROP: 5 SOLUTION/ DROPS OPHTHALMIC at 21:39

## 2023-03-17 ASSESSMENT — ACTIVITIES OF DAILY LIVING (ADL)
ADLS_ACUITY_SCORE: 18
ADLS_ACUITY_SCORE: 18
ADLS_ACUITY_SCORE: 21
ADLS_ACUITY_SCORE: 19
ADLS_ACUITY_SCORE: 19
ADLS_ACUITY_SCORE: 18
ADLS_ACUITY_SCORE: 19
ADLS_ACUITY_SCORE: 18
ADLS_ACUITY_SCORE: 18
ADLS_ACUITY_SCORE: 21

## 2023-03-17 NOTE — PROGRESS NOTES
CLINICAL NUTRITION SERVICES - ASSESSMENT NOTE     Nutrition Prescription    RECOMMENDATIONS FOR MDs/PROVIDERS TO ORDER:  Provider to manage fluids and FWF    Malnutrition Status:    Patient does not meet two of the established criteria necessary for diagnosing malnutrition    Recommendations already ordered by Registered Dietitian (RD):    Osmolite 1.5 Kain @ goal of  60ml/hr  (1440ml/day)  will provide: 2160 kcals, 90 g PRO, 1097 ml free H20, 293 g CHO, and 0 g fiber daily.    Begin TF at 20 mL/hr and increase by 10 mL every 6 hours as tolerated to goal rate 60 ml/hr.     Future/Additional Recommendations:    Adjust to bolus feeds once tolerating TF at goal rate.   Once pt has tolerated goal continuous rate for at least 24 hours, may transition to bolus feeds.  Transition to bolus:   -Stop continuous TF for 1-2 hrs to let stomach empty prior to starting gravity feeding.    -Separate each bolus by 3-4 hrs. Do not give feedings at night while pt asleep (during the day only).  -Begin 1st gravity feeding @ 0.5 cans (~120 mL). If tolerates after approx 4 hours without GI complaints, rec adv each subsequent bolus by 0.5 cans (~120 mL) every 4 hours until reach goal regimen 2 cans TID (total of 6 cans daily of Osmolite 1.5 (or formula equivalent for discharge)).    -Water Flushes: Flush with 60 mL of H20 before and after each feeding plus additional 240 mL H2O TID if TF to provide full hydration (TF + FWF = total of 2163 mL free water daily).   -->Goal Regimen provides: 2133 kcals, 90g protein     REASON FOR ASSESSMENT  Jenaro Kerr is a/an 73 year old male assessed by the dietitian for Provider Order - Registered Dietitian to Assess and Order TF per Medical Nutrition Therapy Protocol    Patient s/p left forearm free flap recon s/p excision of L cheek mass, neck discetion.     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 No recent changes in PO or weight PTA per  "patient report. Patient reports some stomach discomfort with wheat, oats but not rice. Patient reports anticipated TF of 10-14 days. Discussed goal to adjust to bolus feeds for home.     NGT placed 3/16    CURRENT NUTRITION ORDERS  Diet: NPO    Intake/Tolerance: No documented intakes to assess    LABS    Na, K, Mg, P WNL    MEDICATIONS    Decadron    Novolog insulin Q4H    Lopressor    miralax and senna daily      ANTHROPOMETRICS  Height: 177.8 cm (5' 10\")  Most Recent Weight: 85.2 kg (187 lb 13.3 oz)    IBW: 75.5 kg  Body mass index is 26.95 kg/m . BMI Category: Overweight BMI 25-29.9  Weight History: No significant weight loss noted.  Wt Readings from Last 15 Encounters:   03/17/23 85.2 kg (187 lb 13.3 oz)   03/01/23 86.9 kg (191 lb 8 oz)   02/22/23 83.9 kg (185 lb)      86.2 kg (190 lb) 02/09/2023      87.6 kg (193 lb 3.2 oz) 11/17/2022          ASSESSED NUTRITION NEEDS  Dosing Weight: 85.2 kg (Actual BW)   Estimated Energy Needs: 0510-2307 kcals/day (25 - 30 kcals/kg)  Justification: Increased needs  Estimated Protein Needs: 102-128 grams protein/day (1.2 - 1.5 grams of pro/kg)  Justification: Increased needs  Estimated Fluid Needs: 1837-1454 mL/day (1 mL/kcal)   Justification: Maintenance    PHYSICAL FINDINGS  See malnutrition section below.    MALNUTRITION  % Intake: No decreased intake noted  % Weight Loss: Weight loss does not meet criteria for malnutrition   Subcutaneous Fat Loss: None observed   Muscle Loss: None observed  Fluid Accumulation/Edema: None noted  Malnutrition Diagnosis: Patient does not meet two of the established criteria necessary for diagnosing malnutrition    NUTRITION DIAGNOSIS  Inadequate oral intake related to s/p left forearm free flap recon s/p excision of L cheek mass, neck discetion as evidenced by patient NPO with TF for nutrition.       INTERVENTIONS  Implementation  Nutrition Education: will be provided if nutrition education needs arise.    Collaboration with other " providers  Enteral Nutrition - Initiate     Goals  Total avg nutritional intake to meet a minimum of 25 kcal/kg and 1.2 g PRO/kg daily (per dosing wt 85.2 kg).        Monitoring/Evaluation  Progress toward goals will be monitored and evaluated per protocol.    Adriane Gomez RDN, LD  4A SICU RD pager: [phone]  Ascom: [phone]  Weekend/Holiday RD pager: [phone]

## 2023-03-17 NOTE — PROGRESS NOTES
"ENT Free Flap Check  March 17, 2023    S: No issues per nursing with the flap. Feeling bothered by the NG tube - he feels it when he swallows. Minimal pain. Maps good. No changes in breathing/SOB.     O: /68 (BP Location: Left arm, Cuff Size: Adult Regular)   Pulse 63   Temp 97.6  F (36.4  C) (Axillary)   Resp 18   Ht 1.778 m (5' 10\")   Wt 84.5 kg (186 lb 4.6 oz)   SpO2 93%   BMI 26.73 kg/m    General: laying in bed, in good spirits  HEENT: L cheek flap appears viable, warm, soft, without evidence of congestion. Strong implantable and handheld doppler signal. Incisions lines clean, dry, intact. Neck is soft and flat without signs of hematoma. DAVID drains x2 in left neck are sanguinous, appropriate output  Pulmonary: NLB on RA  Extremities: L fingers with good cap refil, sensation intact. Splint in place.     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     -- continue current plan of care    Stefany Castillo MD PGY3  Otolaryngology-Head & Neck Surgery  To contact ENT please dial * [phone] and enter job code [phone].      "

## 2023-03-17 NOTE — PROGRESS NOTES
"Otolaryngology Progress Note  March 17, 202    S: No acute events overnight. NG tube successfully repositioned. Feeling great, minimal pain--mostly in arm. MAPs great.    O: /68 (BP Location: Left arm, Cuff Size: Adult Regular)   Pulse 62   Temp 97.6  F (36.4  C) (Axillary)   Resp 18   Ht 1.778 m (5' 10\")   Wt 85.2 kg (187 lb 13.3 oz)   SpO2 96%   BMI 26.95 kg/m     General: alert and engaged.    HEENT: L cheek flap appears viable, warm, soft, without evidence of congestion. Strong implantable and handheld doppler signal. Incisions c/d/i. Neck is soft and flat without signs of hematoma. DAVID drains x2 w/s/s output    Pulmonary: NLB on RA   Extremities: L fingers with good cap refil, sensation intact. Splint in place. Wound vac holding suction. DAVID x 1 with s/s output. Right thigh STSG with minimal drainage.     DAVID drains:  DAVID 1-- 51/37  DAVID 2-- 7/1  DAVID 3-- 3/3     LABS:  WBC 19.5  Hgb 12.6  TSH 0.29, albumin 3.4    Cr 0.73      A/P: Jenaro Kerr is a 72 yo s/p WLE of left buccal mucosa SCC with marginal mandibulectomy, tooth extraction x3, MRND L1b-4, and reconstruction with left ulnar forearm free flap and right thigh STSG on 2/16/23.     Neuro: pain control per SICU    HEENT:  - Nurse: Q1 hr flap checks per RN x first 24 hours; Q2 hr flap checks per RN x 48 hours; Q4 hr RN checks until discharge. Standard ENT resident flap checks   - Local wound cares to neck (bacitracin QID x 24 hours followed by aquaphor QID). Head in neutral position, HOB at 30 degrees, NO straps around neck, RT@ 72  - Peridex QID  - Red Cole catheter only to suction mouth Q8H (do not cut tip)  - DAVID drain cares   - Decadron q8h x 3 doses      CV/Heme: Keep MAP > 60, SBP > 90 (preferrably > 110)  - Please call ENT on-call before giving fluid bolus, NO vasopressors  - holding PTA atenolol and lisinopril pending SBP   - PTA statin   Respiratory: supplemental oxygen as needed   GI: NPO, NG tube placed.   - Plan to start TF today " pending nutrition recs. Can discontinue IVF once TF start  : PTA flomax, holding pending SBP. Can use doxasosin if urinary retention  Endocrine: TSH, albumin, prealbumin on POD#1  ID: Unasyn x 48 hours  Extremities: Q1H CMS checks left arm donor site. Wound vac off POD5 and then daily dressing changes (Xeroform, telfa, kerlix, ace).  STSG donor site (right thigh): Calcium alginate and tegaderm initially. Change as needed. Once drainage stops, cover with tegaderm. If not irritated, can leave open to air and apply aquaphor.  PPX: lovenox   Consults:  PT/OT, Nutrition, PLC (TF)    Patient and plan discussed with Dr. Ballard and Dr. Urbano Mcbride MD   ENT

## 2023-03-17 NOTE — PLAN OF CARE
Physical Therapy: Orders received. Per chart review and observation of patient ambulating with OT, Physical Therapy not indicated due to patient up with SBA, only requiring 1 therapy discipline while inpatient to progress mobility.? Defer discharge recommendations to OT who will continue to follow.? PT will complete orders, please re-consult if there is a change in functional status.

## 2023-03-17 NOTE — PLAN OF CARE
Goal Outcome Evaluation:      Plan of Care Reviewed With: patient, family    Overall Patient Progress: improvingOverall Patient Progress: improving    Outcome Evaluation: Plan to begin TF via NGT today. Discussed plan to adjust to bolus feeds once patient tolerates continuous TF. Tentative plan to adjust to bolus feeds 3/20. See RD note 3/17 for details.    Adriane Gomez RDN, LD  4A SICU RD pager: [phone]  Ascom: [phone]  Weekend/Holiday RD pager: [phone]

## 2023-03-17 NOTE — PROGRESS NOTES
Major Shift Events: A&O x4. TREVIÑO. SBA. Pt on RA. LS: clear. SR occasional/ rare PACs. BPs stable. PRN oxycodone given for L forearm graft site pain. TF running @ 30mL/hr. Advance to 40mL/hr at 2000. Standard FWF. Sol removed, urinates spontaneously. R radial arterial line removed. x2 PIV SL.  Plan: pt has no ICU needs. Transfer to floor once txp/ SICU places orders. For vital signs and complete assessments, please see documentation flowsheets.  Nikita Mcknight RN on 3/17/2023 at 6:25 PM

## 2023-03-17 NOTE — PROGRESS NOTES
SURGICAL ICU PROGRESS NOTE  03/17/2023      PRIMARY TEAM: ENT  PRIMARY PHYSICIAN: Nirmal Ballard MD     REASON FOR CRITICAL CARE ADMISSION: s/p left forearm free flap recon s/p excision of L cheek mass, neck discetion  CONSULTING PHYSICIAN: Nirmal Ballard MD     ASSESSMENT: Jenaro Kerr is a 72 y/o male with a history of HTN, HLFD, lichen planus which lead to SCC s/p wide local excision of buccal mucosa; left marginal mandibulectomy; left modified radical neck dissection; left ulnar free flap; nasogastric tube placement; split thickness skin graft from right thigh to treat SCC of left buccal mucosa.     Overnight  - Did well overnight, recovering appropriately from surgery.  - NGT repositioned    Changes Today:  - A line out, colon out  - Continue flap checks per ENT protocol  - Encourage ambulation  - Start TF    Neuro/ pain/ sedation:  # Acute postoperative pain  # Chronic pain, arthritis  # Hx anxiety  - Monitor neurological status. Notify the provider for any acute changes in exam.  - Tylenol for pain. PRN oxycodone for breakthrough pain, Dilaudid  - Hold PTA Xanax  - No indication for sedation.    Pulmonary care:   # Tobacco use  - Supplemental oxygen to keep saturation above 92 %.  - Incentive spirometer every 15- 30 minutes when awake. Pulmonary toilet.     Cardiovascular:    # Palpitations  # HTN  # Smoking  - Hold PTA lisinopril  - Starting metoprolol instead of atenolol.   - Monitor hemodynamic status. Goal MAPs > 60   - No vasopressors, fluids/colloids to maintain SBP/MAPs    GI care:   - TF POD1  - NGT repositioned, good to use.     Fluids/ Electrolytes/ Nutrition:   - Stop MIVF  - ICU electrolyte replacement protocol  - No indication for parenteral nutrition.  - Nutrition consulted. Appreciate recs    Renal/ Fluid Balance:    #Urinary retention  - Starting doxazosin   - Colon out  - Will continue to monitor intake and output.     Endocrine:    # Postoperative stress and steroid induced  hyperglycemia  - Dexamethasone for 3 doses per ENT   - sliding scale insulin      ID/ Antibiotics:  -Amara op prophylactic abx - Unasyn x 48 hrs       Heme:     -Hemoglobin stable.       Prophylaxis:    - Mechanical prophylaxis for DVT.   - Lovenox starting this AM      MSK:    # Arthritis of knees  # s/p L forearm free flap  - PT and OT consulted. Appreciate recs.  - Wound vac to L arm, stay in place for 5 days  - Alginate and tegaderm to STSG donor site R thigh.       Lines/ tubes/ drains:  -PIV x 2  -NG tube  -DAVID x 3   -Wound vac     Disposition:  - Floor status later this afternoon, no apparent ICU needs.     Patient seen and discussed with staff.    Corky Montanez MD PGY1  Plastic and Reconstructive Surgery    ====================================    TODAY'S SUBJECTIVE/INTERVAL HISTORY:   Doing well, pain controlled, breathing comfortably.     OBJECTIVE:     Temp:  [97.6  F (36.4  C)-97.8  F (36.6  C)] 97.6  F (36.4  C)  Pulse:  [60-76] 63  Resp:  [8-18] 18  BP: (115-123)/(68-69) 115/68  MAP:  [67 mmHg-122 mmHg] 85 mmHg  Arterial Line BP: ()/(50-88) 127/63  SpO2:  [93 %-98 %] 96 %  Resp: 18      I/O last 3 completed shifts:  In: 3501.67 [I.V.:3411.67; NG/GT:90]  Out: 1671 [Urine:1560; Drains:61; Blood:50]    General/Neuro: NAD, neurovasc intact, no obvious deficits  CV: RRR  Pulm: MELISSA, non-labored  Abd: soft; NT, ND  Extremities: no edema, soft brace in place, kerlix and wound vac. Tegi and alginate in place over STSG donor site.   Skin: warm and well-perfused.     LABS:   Arterial Blood Gases   Recent Labs   Lab 03/16/23  1451 03/16/23  1245 03/16/23  1038   PH 7.36 7.40 7.41   PCO2 45 41 40   PO2 124* 111* 97   HCO3 25 25 26     Complete Blood Count   Recent Labs   Lab 03/17/23  0428 03/16/23  1451 03/16/23  1245 03/16/23  1038   WBC 19.5*  --   --   --    HGB 12.6* 13.9 14.2 13.4     --   --   --      Basic Metabolic Panel  Recent Labs   Lab 03/17/23 0428 03/17/23  0427 03/17/23  0015  03/16/23  1834 03/16/23  1830 03/16/23  1719 03/16/23  1451 03/16/23  1245 03/16/23  1038     --   --   --   --   --  140 140 139   POTASSIUM 4.4  --   --   --   --   --  3.9 4.0 4.5   CHLORIDE 108*  --   --   --   --   --   --   --   --    CO2 21*  --   --   --   --   --   --   --   --    BUN 13.4  --   --   --   --   --   --   --   --    CR 0.73  --   --   --  0.71  --   --   --   --    * 139* 134* 221*  --    < > 169* 162* 124*    < > = values in this interval not displayed.     Liver Function Tests  Recent Labs   Lab 03/17/23  0428   ALBUMIN 3.4*     Pancreatic Enzymes  No lab results found in last 7 days.  Coagulation Profile  No lab results found in last 7 days.      IMAGING:   Recent Results (from the past 24 hour(s))   XR Abdomen Port 1 View   Result Value    Radiologist flags (Urgent)     Feeding tube malpositioned within the right lower lobe    Narrative    EXAM: XR ABDOMEN PORT 1 VIEW  3/16/2023 6:21 PM     HISTORY:  eval NG placement, want tip in stomach       COMPARISON:  PET/CT 3/1/2023    FINDINGS:   Enteric tube terminates within the right lower lobe segmental  bronchus. Nonobstructive bowel gas pattern. No pneumatosis or portal  venous gas. Left basilar atelectasis. Degenerative changes of the  spine.      Impression    IMPRESSION:   Feeding tube tip terminates within the segmental right lower lobe  bronchus. Recommend surgical consultation prior to feeding tube  removal, given the possibility of inducing a pneumothorax upon  removal.    [Urgent Result: Feeding tube malpositioned within the right lower lobe  segmental bronchus.    Finding was identified on 3/16/2023 6:30 PM.     Dr. Mcbride was contacted by Dr. Zain Rosa at 3/16/2023 6:35 PM and  verbalized understanding of the urgent finding.     I have personally reviewed the examination and initial interpretation  and I agree with the findings.    KRZYSZTOF RHODES MD         SYSTEM ID:  [id number]   XR Abdomen Port 1 View    Narrative     EXAM: XR ABDOMEN PORT 1 VIEW  3/16/2023 9:20 PM      HISTORY: ng tube verfication    COMPARISON: Same day abdominal x-ray at 1740    FINDINGS: Single AP view of the abdomen. Enteric tube terminates over  the stomach.    Nonobstructive bowel gas pattern. No pneumatosis. No portal venous  gas. Bibasilar linear opacities, likely atelectasis. No acute osseous  abnormality.      Impression    IMPRESSION: Repositioning of the enteric tube, now terminating over  the stomach.    I have personally reviewed the examination and initial interpretation  and I agree with the findings.    KRZYSZTOF RHODES MD         SYSTEM ID:  [id number]

## 2023-03-17 NOTE — CONSULTS
"SURGICAL ICU PROGRESS NOTE  3/16/2023    PRIMARY TEAM: ENT  PRIMARY PHYSICIAN: Nirmal Ballard MD    REASON FOR CRITICAL CARE ADMISSION: s/p left forearm free flap recon s/p excision of L cheek mass, neck discetion  CONSULTING PHYSICIAN: Nirmal Ballard MD    ASSESSMENT: Jenaro Kerr is a 72 y/o male with a history of HTN, HLFD, lichen planus which lead to SCC s/p wide local excision of buccal mucosa; left marginal mandibulectomy; left modified radical neck dissection; nasogastric tube placement; left forearm free flap; split thickness skin graft from right thigh to treat SCC of left buccal mucosa.       PLAN:  Neuro/ pain/ sedation:  # Acute postoperative pain  # Chronic pain, arthritis  # Hx anxiety  -Monitor neurological status. Notify the provider for any acute changes in exam.  -Tylenol for pain. PRN oxycodone for breakthrough pain, Dilaudid  - Hold PTA Xanax  - No indication for sedation.    Pulmonary care:   # Tobacco use  -Supplemental oxygen to keep saturation above 92 %.  - Incentive spirometer every 15- 30 minutes when awake. Pulmonary toilet.    Cardiovascular:    # Palpitations  # HTN  # Smoking  Per ENT:   \"- Keep MAP > 60, SBP > 90 (preferrably > 110)  - Please call ENT on-call before giving fluid bolus  - NO vasopressors  - holding PTA atenolol and lisinopril pending SBP   - PTA statin \"  Agree    GI care:   - ADAT on POD1  - ENT to reposition NGT     Fluids/ Electrolytes/ Nutrition:   -LR for IV fluid hydration 100mL/hr  -ICU electrolyte replacement protocol  -No indication for parenteral nutrition.  -Nutrition consulted. Appreciate recs    Renal/ Fluid Balance:    #Urinary retention  - Hold PTA flomax   -Urine output monitor  -Will continue to monitor intake and output. Sol in place    Endocrine:    # Postoperative stress and steroid induced hyperglycemia  - Dexamethasone per ENT   - sliding scale insulin     ID/ Antibiotics:  -Amara op prophylactic abx - Unasyn x 48 hrs      Heme:   "   -Hemoglobin stable.      Prophylaxis:    -Mechanical prophylaxis for DVT.   - Chemoprophylaxis in the AM     MSK:    # Arthritis of knees  # s/p L forearm free flap  -PT and OT consulted. Appreciate recs.     Lines/ tubes/ drains:  -PIV x 3  -R radial arterial line  -NG tube  -Sol  - DAVID x 3      Disposition:  -Surgical ICU for Q1H free flap checks    Patient seen, findings and plan discussed with surgical ICU staff.    Julian Perdue MD  General Surgery PGY 2 Resident      - - - - - - - - - - - - - - - - - - - - - - - - - - - - - - - - - - - - - - - - - - - - - - - - - - - - - - - - - - - - - - - - - - - - - - - -     HISTORY PRESENTING ILLNESS: Jenaro Kerr is a 72 y/o male s/p wide local excision of buccal mucosa; left marginal mandibulectomy; left modified radical neck dissection, nasogastric tube placement; left forearm free flap; split thickness skin graft from right thigh to treat SCC of left buccal mucosa.    REVIEW OF SYSTEMS: 10 point ROS neg other than the symptoms noted above in the HPI.      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  HTN  HLD  SURGICAL HISTORY:    has a past surgical history that includes Eye surgery (2019); hernia repair; ANESTH,EAR SURGERY; and Leg Surgery (Right).    SOCIAL HISTORY:    reports that he quit smoking about 28 years ago. His smoking use included cigarettes. He has a 23.00 pack-year smoking history. He has never used smokeless tobacco. He reports current alcohol use. He reports that he does not use drugs.    FAMILY HISTORY: No bleeding/clotting disorders nor problems with anesthesia.     ALLERGIES:      Allergies   Allergen Reactions     Erythromycin GI Disturbance     Other reaction(s): Gastrointestinal       MEDICATIONS:  No current facility-administered medications on file prior to encounter.  ALPRAZolam (XANAX) 0.25 MG tablet, Take 0.25 mg by mouth 3 times daily as  needed for anxiety  atenolol (TENORMIN) 25 MG tablet, Take 25 mg by mouth every morning  atorvastatin (LIPITOR) 80 MG tablet, Take 40 mg by mouth every morning  cycloSPORINE (RESTASIS) 0.05 % ophthalmic emulsion, two times a day.  dimenhyDRINATE (DRAMAMINE) 50 MG tablet, Take 25 mg by mouth nightly as needed for other (for anxiety)  lisinopril (ZESTRIL) 30 MG tablet, Take 30 mg by mouth every morning  moxifloxacin (VIGAMOX) 0.5 % ophthalmic solution, 1 drop 2 times daily  multivitamin (CENTRUM SILVER) tablet, every morning  mupirocin (BACTROBAN) 2 % external ointment,   tamsulosin (FLOMAX) 0.4 MG capsule, Take 0.4 mg by mouth every morning        PHYSICAL EXAMINATION:  Temp:  [97.6  F (36.4  C)-97.8  F (36.6  C)] 97.6  F (36.4  C)  Pulse:  [60-76] 63  Resp:  [8-18] 18  BP: (115-123)/(68-69) 115/68  MAP:  [67 mmHg-122 mmHg] 85 mmHg  Arterial Line BP: ()/(50-88) 127/63  SpO2:  [93 %-98 %] 96 %      Gen: no acute distress, resting comfortably in bed  HEENT: Atraumatic, normocephalic; NGT in R nostril; Left neck incision c/d/i with dermabond  Neuro: Alert and oriented. No gross neurologic deficits   Pulm: nonlabored breathing on room air, no cough  CV: RRR by radial pulse, noncyanotic   ABD:soft, non tender, nondistended  MSK:  Normal active range of motion, no edema  Skin: Warm, dry, no rashes or abrasions on exposed skin  Psych: Normal affect, cooperative      LABS: Reviewed.   Arterial Blood Gases   Recent Labs   Lab 03/16/23  1451 03/16/23  1245 03/16/23  1038   PH 7.36 7.40 7.41   PCO2 45 41 40   PO2 124* 111* 97   HCO3 25 25 26     Complete Blood Count   Recent Labs   Lab 03/17/23  0428 03/16/23  1451 03/16/23  1245 03/16/23  1038   WBC 19.5*  --   --   --    HGB 12.6* 13.9 14.2 13.4     --   --   --      Basic Metabolic Panel  Recent Labs   Lab 03/17/23  0428 03/17/23  0427 03/17/23  0015 03/16/23  1834 03/16/23  1830 03/16/23  1719 03/16/23  1451 03/16/23  1245 03/16/23  1038     --   --   --    --   --  140 140 139   POTASSIUM 4.4  --   --   --   --   --  3.9 4.0 4.5   CHLORIDE 108*  --   --   --   --   --   --   --   --    CO2 21*  --   --   --   --   --   --   --   --    BUN 13.4  --   --   --   --   --   --   --   --    CR 0.73  --   --   --  0.71  --   --   --   --    * 139* 134* 221*  --    < > 169* 162* 124*    < > = values in this interval not displayed.     Liver Function Tests  Recent Labs   Lab 03/17/23  0428   ALBUMIN 3.4*     Pancreatic Enzymes  No lab results found in last 7 days.  Coagulation Profile  No lab results found in last 7 days.  Lactate  Invalid input(s): LACTATE    IMAGING:  Recent Results (from the past 24 hour(s))   XR Abdomen Port 1 View   Result Value    Radiologist flags (Urgent)     Feeding tube malpositioned within the right lower lobe    Narrative    EXAM: XR ABDOMEN PORT 1 VIEW  3/16/2023 6:21 PM     HISTORY:  eval NG placement, want tip in stomach       COMPARISON:  PET/CT 3/1/2023    FINDINGS:   Enteric tube terminates within the right lower lobe segmental  bronchus. Nonobstructive bowel gas pattern. No pneumatosis or portal  venous gas. Left basilar atelectasis. Degenerative changes of the  spine.      Impression    IMPRESSION:   Feeding tube tip terminates within the segmental right lower lobe  bronchus. Recommend surgical consultation prior to feeding tube  removal, given the possibility of inducing a pneumothorax upon  removal.    [Urgent Result: Feeding tube malpositioned within the right lower lobe  segmental bronchus.    Finding was identified on 3/16/2023 6:30 PM.     Dr. Mcbride was contacted by Dr. Zain Rosa at 3/16/2023 6:35 PM and  verbalized understanding of the urgent finding.     I have personally reviewed the examination and initial interpretation  and I agree with the findings.    KRZYSZTOF RHODES MD         SYSTEM ID:  [id number]   XR Abdomen Port 1 View    Narrative    EXAM: XR ABDOMEN PORT 1 VIEW  3/16/2023 9:20 PM      HISTORY: ng tube  verfication    COMPARISON: Same day abdominal x-ray at 1740    FINDINGS: Single AP view of the abdomen. Enteric tube terminates over  the stomach.    Nonobstructive bowel gas pattern. No pneumatosis. No portal venous  gas. Bibasilar linear opacities, likely atelectasis. No acute osseous  abnormality.      Impression    IMPRESSION: Repositioning of the enteric tube, now terminating over  the stomach.    I have personally reviewed the examination and initial interpretation  and I agree with the findings.    KRZYSZTOF RHODES MD         SYSTEM ID:  [id number]

## 2023-03-17 NOTE — PLAN OF CARE
Goal Outcome Evaluation:    Major Shift Events:  Patient alert and oriented.  Follows commands and moves all extremities.  In NSR with occasional PACs. -110's, MAP 65-75.  On RA with clear lung sounds, sating 92-95%.  Afebrile.  Oral flap site with soft and warm, pale pink in color.  Venous and arterial pulses present via doppler.  Q1 hour flap checks.  CMS intact on LUE and RLE.  Cap refill <3 and pulses palpable.  Denies N/T. Q1 hour CMS checks.  Pain mostly in left arm graft site.  Oxycodone for pain.  JPs with 0-10cc out per hour.  0cc of output from wound vac.  Adequate urine output.  Hypoactive bowel sounds, denies flatus.  NGT readjusted per ENT.    Plan: Q1 hour flap and CMS checks, nutrition consult, out of bed    For vital signs and complete assessments, please see documentation flowsheets.

## 2023-03-17 NOTE — PROGRESS NOTES
03/17/23 1156   Appointment Info   Signing Clinician's Name / Credentials (OT) Olga Vega OTR/L   Rehab Comments (OT) neck in neutral, no snaps/ties around neck   Living Environment   People in Home spouse   Current Living Arrangements house   Home Accessibility stairs to enter home   Number of Stairs, Main Entrance 1   Stair Railings, Main Entrance none   Transportation Anticipated car, drives self;family or friend will provide   Living Environment Comments Pt reports living with wife in a single level home, 1 [address]. All needs met on main level. Pt has a walk in shower, no shower chair or grab bars though pt reports they are able to install if needed.   Self-Care   Usual Activity Tolerance good   Activity/Exercise/Self-Care Comment Pt reports being IND at baseline w/ ADL/mobility   Instrumental Activities of Daily Living (IADL)   IADL Comments Pt is retired. Shares IADL tasks w/ spouse who is able to assist PRn   General Information   Onset of Illness/Injury or Date of Surgery 03/16/23   Referring Physician Gio Mcbride MD   Patient/Family Therapy Goal Statement (OT) Return to PLOF   Additional Occupational Profile Info/Pertinent History of Current Problem Jenaro Kerr is a 72 y/o male with a history of HTN, HLFD, lichen planus which lead to SCC s/p wide local excision of buccal mucosa; left marginal mandibulectomy; left modified radical neck dissection; left ulnar free flap; nasogastric tube placement; split thickness skin graft from right thigh to treat SCC of left buccal mucosa.   Existing Precautions/Restrictions other (see comments)  (neck in neutral, no snaps/ties around neck)   General Observations and Info Activity: Ambulate   Cognitive Status Examination   Orientation Status orientation to person, place and time   Cognitive Status Comments Cognition appears WFL, will continue to monitor   Visual Perception   Visual Impairment/Limitations WFL   Sensory   Sensory Quick Adds sensation intact   Pain  Assessment   Patient Currently in Pain Yes, see Vital Sign flowsheet   Posture   Posture not impaired   Range of Motion Comprehensive   General Range of Motion no range of motion deficits identified   Strength Comprehensive (MMT)   General Manual Muscle Testing (MMT) Assessment no strength deficits identified   Coordination   Functional Limitations Impaired ability to perform bilateral tasks   Coordination Comments LUE limited d/t splint/bandaging, otherwise WFL   Transfers   Transfers sit-stand transfer   Sit-Stand Transfer   Sit-Stand Esmeralda (Transfers) contact guard   Activities of Daily Living   BADL Assessment/Intervention bathing;upper body dressing;lower body dressing;grooming;toileting   Bathing Assessment/Intervention   Esmeralda Level (Bathing) minimum assist (75% patient effort)   Comment, (Bathing) Per clinical judgment   Upper Body Dressing Assessment/Training   Comment, (Upper Body Dressing) Per clinical judgment   Esmeralda Level (Upper Body Dressing) moderate assist (50% patient effort)   Lower Body Dressing Assessment/Training   Comment, (Lower Body Dressing) Per clinical judgment   Esmeralda Level (Lower Body Dressing) supervision   Grooming Assessment/Training   Esmeralda Level (Grooming) set up;supervision   Comment, (Grooming) Per clinical judgment   Toileting   Comment, (Toileting) Per clinical judgment   Esmeralda Level (Toileting) minimum assist (75% patient effort)   Clinical Impression   Criteria for Skilled Therapeutic Interventions Met (OT) Yes, treatment indicated   OT Diagnosis Decreased ADL/IADL I   OT Problem List-Impairments impacting ADL problems related to;activity tolerance impaired;pain;post-surgical precautions;range of motion (ROM)   Assessment of Occupational Performance 5 or more Performance Deficits   Identified Performance Deficits Dressing, bathing, toileting, home mgmt, functional mobility/transfers   Planned Therapy Interventions (OT) ADL  retraining;IADL retraining;fine motor coordination training;ROM;strengthening;transfer training;home program guidelines;progressive activity/exercise;risk factor education   Clinical Decision Making Complexity (OT) moderate complexity   Anticipated Equipment Needs Upon Discharge (OT) other (see comments)  (TBD)   Risk & Benefits of therapy have been explained evaluation/treatment results reviewed;care plan/treatment goals reviewed;risks/benefits reviewed;current/potential barriers reviewed;participants voiced agreement with care plan;patient;participants included;spouse/significant other   Clinical Impression Comments Pt will benefit from skilled OT services to progress IND w/ ADLs/IADLs and facilitate return to PLOF   OT Total Evaluation Time   OT Eval, Moderate Complexity Minutes (06393) 10   OT Goals   Therapy Frequency (OT) 6 times/wk   OT Predicted Duration/Target Date for Goal Attainment 03/31/23   OT Goals Upper Body Dressing;Lower Body Dressing;Upper Body Bathing;Lower Body Bathing;Toilet Transfer/Toileting;Home Management;OT Goal 1   OT: Upper Body Dressing Modified independent;using adaptive equipment;within precautions;including set-up/clothing retrieval   OT: Lower Body Dressing Modified independent;using adaptive equipment;within precautions;including set-up/clothing retrieval   OT: Upper Body Bathing Modified independent;using adaptive equipment;within precautions   OT: Lower Body Bathing Modified independent;using adaptive equipment;with precautions   OT: Toilet Transfer/Toileting Modified independent;toilet transfer;cleaning and garment management;using adaptive equipment;within precautions   OT: Home Management Supervision/stand-by assist;with light demand household tasks;ambulatory level;within precautions;using adaptive equipment   OT: Goal 1 Pt will ascend/descend 1 step w/ SBA prior to discharge to ensure safety returning to home environment.   Interventions   Interventions Quick Adds  Therapeutic Activity;Therapeutic Procedures/Exercise   Therapeutic Procedures/Exercise   Therapeutic Procedure: strength, endurance, ROM, flexibillity minutes (28506) 8   Symptoms Noted During/After Treatment none   Treatment Detail/Skilled Intervention OT: Initiated head/neck exercise handout to promote funcitonal ROM and prevent scar tissue formation per ENT request, cancelled out neck exercises on form d/t precautions (neck in neutral). Led pt through 1x10 of 3 shoulder ex (shoulder flex, scapular retraction w/ pec stretch, and shoulder shrugs). Pt completing w/ min v/c for appropriate technique. Educated pt on completing 2-3x/day within pain-free ROM. Discussed current OT orders w/ ENT PA who confirmed no need for splint POD 5, will change orders as well as no WBing precautions for LUE.   Therapeutic Activities   Therapeutic Activity Minutes (01086) 24   Symptoms noted during/after treatment fatigue   Treatment Detail/Skilled Intervention OT: Greeted pt up in chair, agreeable to therapy. Educated pt on post-op precautions including neck in neutral, no snaps/ties around neck (pt greeted with gown tied behind neck - therapist untying and educating pt on importance of adhereing to precautions) and WBAT in LUE. Time needed for line mgmt and room setup. Facilitated functional transfers to promote IND w/ ADLs, see above for initial assessment. Pt ambulating ~900 ft w/ SBA and no AD, appearing steady w/o balance deficit, good activity tolerance and able to maintain conversation throughout. Educated pt on continuing to ambulate w/ RN/staff to progress act rachel and prevent deconditioning, pt in agreement and motivated. Left pt up in chair w/ call light in reach and all immediate needs met. VSS on RA throughout.   OT Discharge Planning   OT Plan Review precautions, standing ADL, toileting, step ups (1 stair at home)   OT Discharge Recommendation (DC Rec) home with assist   OT Rationale for DC Rec Pt presents below  baseline, limited by weakness, deconditioning, pain and post-op precautions, however progressing very well POD 1. Anticipate pt will be safe to discharge home w/ A when medically ready.   OT Brief overview of current status SBA   Total Session Time   Timed Code Treatment Minutes 32   Total Session Time (sum of timed and untimed services) 42

## 2023-03-17 NOTE — PROGRESS NOTES
"ENT Free Flap Check  March 16, 2023    S: No issues per nursing with the flap. NG tube was malpositioned. Removed and replaced the tube at bedside with post placement xray showing it in good position. Resutured tube to R nare ghost suture at 65cm.  Pt tolerated well. Minimal post-op pain except some mild pain in the left arm. MAPs excellent.     O: /69   Pulse 70   Temp 97.8  F (36.6  C) (Axillary)   Resp 15   Ht 1.778 m (5' 10\")   Wt 84.5 kg (186 lb 4.6 oz)   SpO2 96%   BMI 26.73 kg/m    General: sitting up in bed, in good spirits  HEENT: L cheek flap appears viable, warm, soft, without evidence of congestion. Strong implantable and handheld doppler signal. Incisions lines clean, dry, intact. Neck is soft and flat without signs of hematoma. DAVID drains x2 in left neck are sanguinous, appropriate output  Pulmonary: NLB on RA  Extremities: L fingers with good cap refil, sensation intact. Splint in place.     A/P: Jenaro Kerr is a 73 year old male, POD #0 s/p WLE of SCC of L buccal mucosa with marginal mandibulectomy, tooth extraction x3, MRND L1-4 and Left ulnar forearm free flap on 2/16/23.     -- continue current plan of care    Stefany Castillo MD PGY3  Otolaryngology-Head & Neck Surgery  To contact ENT please dial * [phone] and enter job code [phone].      "

## 2023-03-18 LAB
ANION GAP SERPL CALCULATED.3IONS-SCNC: 10 MMOL/L (ref 7–15)
BUN SERPL-MCNC: 19.2 MG/DL (ref 8–23)
CALCIUM SERPL-MCNC: 8.6 MG/DL (ref 8.8–10.2)
CHLORIDE SERPL-SCNC: 108 MMOL/L (ref 98–107)
CREAT SERPL-MCNC: 0.76 MG/DL (ref 0.67–1.17)
DEPRECATED HCO3 PLAS-SCNC: 24 MMOL/L (ref 22–29)
ERYTHROCYTE [DISTWIDTH] IN BLOOD BY AUTOMATED COUNT: 12.9 % (ref 10–15)
GFR SERPL CREATININE-BSD FRML MDRD: >90 ML/MIN/1.73M2
GLUCOSE BLDC GLUCOMTR-MCNC: 123 MG/DL (ref 70–99)
GLUCOSE BLDC GLUCOMTR-MCNC: 137 MG/DL (ref 70–99)
GLUCOSE BLDC GLUCOMTR-MCNC: 143 MG/DL (ref 70–99)
GLUCOSE BLDC GLUCOMTR-MCNC: 149 MG/DL (ref 70–99)
GLUCOSE BLDC GLUCOMTR-MCNC: 152 MG/DL (ref 70–99)
GLUCOSE BLDC GLUCOMTR-MCNC: 153 MG/DL (ref 70–99)
GLUCOSE BLDC GLUCOMTR-MCNC: 164 MG/DL (ref 70–99)
GLUCOSE SERPL-MCNC: 148 MG/DL (ref 70–99)
HCT VFR BLD AUTO: 37.7 % (ref 40–53)
HGB BLD-MCNC: 12.3 G/DL (ref 13.3–17.7)
MAGNESIUM SERPL-MCNC: 2.2 MG/DL (ref 1.7–2.3)
MCH RBC QN AUTO: 30.8 PG (ref 26.5–33)
MCHC RBC AUTO-ENTMCNC: 32.6 G/DL (ref 31.5–36.5)
MCV RBC AUTO: 95 FL (ref 78–100)
PHOSPHATE SERPL-MCNC: 3.5 MG/DL (ref 2.5–4.5)
PLATELET # BLD AUTO: 189 10E3/UL (ref 150–450)
POTASSIUM SERPL-SCNC: 4.4 MMOL/L (ref 3.4–5.3)
RBC # BLD AUTO: 3.99 10E6/UL (ref 4.4–5.9)
SODIUM SERPL-SCNC: 142 MMOL/L (ref 136–145)
WBC # BLD AUTO: 18.9 10E3/UL (ref 4–11)

## 2023-03-18 PROCEDURE — 80048 BASIC METABOLIC PNL TOTAL CA: CPT | Performed by: STUDENT IN AN ORGANIZED HEALTH CARE EDUCATION/TRAINING PROGRAM

## 2023-03-18 PROCEDURE — 200N000002 HC R&B ICU UMMC

## 2023-03-18 PROCEDURE — 83735 ASSAY OF MAGNESIUM: CPT | Performed by: OTOLARYNGOLOGY

## 2023-03-18 PROCEDURE — 250N000013 HC RX MED GY IP 250 OP 250 PS 637: Performed by: OTOLARYNGOLOGY

## 2023-03-18 PROCEDURE — 250N000013 HC RX MED GY IP 250 OP 250 PS 637: Performed by: STUDENT IN AN ORGANIZED HEALTH CARE EDUCATION/TRAINING PROGRAM

## 2023-03-18 PROCEDURE — 84100 ASSAY OF PHOSPHORUS: CPT | Performed by: OTOLARYNGOLOGY

## 2023-03-18 PROCEDURE — 85014 HEMATOCRIT: CPT | Performed by: STUDENT IN AN ORGANIZED HEALTH CARE EDUCATION/TRAINING PROGRAM

## 2023-03-18 PROCEDURE — 250N000011 HC RX IP 250 OP 636: Performed by: STUDENT IN AN ORGANIZED HEALTH CARE EDUCATION/TRAINING PROGRAM

## 2023-03-18 PROCEDURE — 999N000128 HC STATISTIC PERIPHERAL IV START W/O US GUIDANCE

## 2023-03-18 PROCEDURE — 36415 COLL VENOUS BLD VENIPUNCTURE: CPT | Performed by: STUDENT IN AN ORGANIZED HEALTH CARE EDUCATION/TRAINING PROGRAM

## 2023-03-18 RX ADMIN — Medication 6.25 MG: at 07:38

## 2023-03-18 RX ADMIN — POLYETHYLENE GLYCOL 3350 17 G: 17 POWDER, FOR SOLUTION ORAL at 07:37

## 2023-03-18 RX ADMIN — WHITE PETROLATUM: 1.75 OINTMENT TOPICAL at 06:24

## 2023-03-18 RX ADMIN — WHITE PETROLATUM: 1.75 OINTMENT TOPICAL at 23:37

## 2023-03-18 RX ADMIN — CYCLOSPORINE 1 DROP: 0.5 EMULSION OPHTHALMIC at 20:51

## 2023-03-18 RX ADMIN — WHITE PETROLATUM: 1.75 OINTMENT TOPICAL at 14:55

## 2023-03-18 RX ADMIN — INSULIN ASPART 1 UNITS: 100 INJECTION, SOLUTION INTRAVENOUS; SUBCUTANEOUS at 04:41

## 2023-03-18 RX ADMIN — SENNOSIDES 8.6 MG: 8.6 TABLET, FILM COATED ORAL at 07:37

## 2023-03-18 RX ADMIN — ATORVASTATIN CALCIUM 40 MG: 40 TABLET, FILM COATED ORAL at 07:37

## 2023-03-18 RX ADMIN — CHLORHEXIDINE GLUCONATE 0.12% ORAL RINSE 15 ML: 1.2 LIQUID ORAL at 00:42

## 2023-03-18 RX ADMIN — AMPICILLIN SODIUM AND SULBACTAM SODIUM 3 G: 2; 1 INJECTION, POWDER, FOR SOLUTION INTRAMUSCULAR; INTRAVENOUS at 10:45

## 2023-03-18 RX ADMIN — CYCLOSPORINE 1 DROP: 0.5 EMULSION OPHTHALMIC at 07:36

## 2023-03-18 RX ADMIN — CHLORHEXIDINE GLUCONATE 0.12% ORAL RINSE 15 ML: 1.2 LIQUID ORAL at 23:30

## 2023-03-18 RX ADMIN — Medication 6.25 MG: at 20:53

## 2023-03-18 RX ADMIN — ACETAMINOPHEN 975 MG: 325 TABLET ORAL at 20:51

## 2023-03-18 RX ADMIN — MOXIFLOXACIN 1 DROP: 5 SOLUTION/ DROPS OPHTHALMIC at 08:33

## 2023-03-18 RX ADMIN — INSULIN ASPART 1 UNITS: 100 INJECTION, SOLUTION INTRAVENOUS; SUBCUTANEOUS at 07:46

## 2023-03-18 RX ADMIN — OXYCODONE HYDROCHLORIDE 5 MG: 5 TABLET ORAL at 07:38

## 2023-03-18 RX ADMIN — CHLORHEXIDINE GLUCONATE 0.12% ORAL RINSE 15 ML: 1.2 LIQUID ORAL at 16:14

## 2023-03-18 RX ADMIN — HYDROMORPHONE HYDROCHLORIDE 0.2 MG: 1 INJECTION, SOLUTION INTRAMUSCULAR; INTRAVENOUS; SUBCUTANEOUS at 02:48

## 2023-03-18 RX ADMIN — AMPICILLIN SODIUM AND SULBACTAM SODIUM 3 G: 2; 1 INJECTION, POWDER, FOR SOLUTION INTRAMUSCULAR; INTRAVENOUS at 04:29

## 2023-03-18 RX ADMIN — INSULIN ASPART 1 UNITS: 100 INJECTION, SOLUTION INTRAVENOUS; SUBCUTANEOUS at 00:38

## 2023-03-18 RX ADMIN — DOXAZOSIN 1 MG: 1 TABLET ORAL at 08:15

## 2023-03-18 RX ADMIN — OXYCODONE HYDROCHLORIDE 5 MG: 5 TABLET ORAL at 02:48

## 2023-03-18 RX ADMIN — MOXIFLOXACIN 1 DROP: 5 SOLUTION/ DROPS OPHTHALMIC at 20:53

## 2023-03-18 RX ADMIN — CHLORHEXIDINE GLUCONATE 0.12% ORAL RINSE 15 ML: 1.2 LIQUID ORAL at 07:36

## 2023-03-18 RX ADMIN — DEXAMETHASONE SODIUM PHOSPHATE 6 MG: 4 INJECTION, SOLUTION INTRA-ARTICULAR; INTRALESIONAL; INTRAMUSCULAR; INTRAVENOUS; SOFT TISSUE at 02:50

## 2023-03-18 RX ADMIN — INSULIN ASPART 1 UNITS: 100 INJECTION, SOLUTION INTRAVENOUS; SUBCUTANEOUS at 21:03

## 2023-03-18 RX ADMIN — ENOXAPARIN SODIUM 40 MG: 40 INJECTION SUBCUTANEOUS at 07:44

## 2023-03-18 RX ADMIN — ACETAMINOPHEN 975 MG: 325 TABLET ORAL at 14:48

## 2023-03-18 RX ADMIN — ACETAMINOPHEN 975 MG: 325 TABLET ORAL at 07:38

## 2023-03-18 RX ADMIN — INSULIN ASPART 1 UNITS: 100 INJECTION, SOLUTION INTRAVENOUS; SUBCUTANEOUS at 16:18

## 2023-03-18 RX ADMIN — AMPICILLIN SODIUM AND SULBACTAM SODIUM 3 G: 2; 1 INJECTION, POWDER, FOR SOLUTION INTRAMUSCULAR; INTRAVENOUS at 16:14

## 2023-03-18 ASSESSMENT — ACTIVITIES OF DAILY LIVING (ADL)
ADLS_ACUITY_SCORE: 18
ADLS_ACUITY_SCORE: 19
ADLS_ACUITY_SCORE: 21
ADLS_ACUITY_SCORE: 19
ADLS_ACUITY_SCORE: 18
ADLS_ACUITY_SCORE: 18
ADLS_ACUITY_SCORE: 19
ADLS_ACUITY_SCORE: 18
ADLS_ACUITY_SCORE: 19
ADLS_ACUITY_SCORE: 18

## 2023-03-18 NOTE — PROGRESS NOTES
"ENT Free Flap Check - virtual  March 18, 2023    S: No issues per nursing with the flap.    O: /59   Pulse 58   Temp 97.4  F (36.3  C) (Axillary)   Resp 16   Ht 1.778 m (5' 10\")   Wt 85.2 kg (187 lb 13.3 oz)   SpO2 98%   BMI 26.95 kg/m    General: laying in bed, NAD  HEENT: L cheek flap appears viable, warm, soft, without evidence of congestion. Strong implantable and handheld doppler signal. Incisions lines clean, dry, intact. Neck is soft and flat without signs of hematoma.  Pulmonary: NLB on RA  Extremities: L fingers with good cap refil, sensation intact. Splint in place.     A/P: Jenaro Kerr is a 73 year old male, POD #1 s/p WLE of SCC of L buccal mucosa with marginal mandibulectomy, tooth extraction x3, L MRND L1-4 and Left ulnar forearm free flap on 2/16/23.     -- continue current plan of care    Stefany Castillo MD PGY3  Otolaryngology-Head & Neck Surgery  To contact ENT please dial * [phone] and enter job code [phone].      "

## 2023-03-18 NOTE — PROGRESS NOTES
"Otolaryngology Progress Note      S: No acute events overnight. Walked around multiple times yesterday. No flap concerns     O: /54   Pulse 68   Temp 97.5  F (36.4  C) (Axillary)   Resp 16   Ht 1.778 m (5' 10\")   Wt 85.2 kg (187 lb 13.3 oz)   SpO2 92%   BMI 26.95 kg/m     General: alert and engaged.    HEENT: L cheek flap appears viable, warm, soft, without evidence of congestion. Strong implantable and handheld doppler signal. Incisions c/d/i. Neck is soft and flat without signs of hematoma. DAVID drains x2 w/s/s output    Pulmonary: NLB on RA   Extremities: L fingers with good cap refill, sensation intact. Splint in place. Wound vac holding suction. DAVID x 1 with s/s output. Right thigh STSG with minimal drainage.     DAVID drains:  DAVID 1-- 30/13/5  DAVID 2-- 10/3/2-- REMOVED   DAVID 3-- 7/2/3    LABS:  WBC 18.9  TSH 0.29, albumin 3.4    Cr 0.76      A/P: Jenaro Kerr is a 72 yo s/p WLE of left buccal mucosa SCC with marginal mandibulectomy, tooth extraction x3, MRND L1b-4, and reconstruction with left ulnar forearm free flap and right thigh STSG on 2/16/23.     Neuro: pain control per SICU    HEENT:  - Nurse: Q1 hr flap checks per RN x first 24 hours; Q2 hr flap checks per RN x 48 hours; Q4 hr RN checks until discharge. Standard ENT resident flap checks   - Local wound cares to neck (bacitracin QID x 24 hours followed by aquaphor QID). Head in neutral position, HOB at 30 degrees, NO straps around neck, RT@ 72  - Peridex QID  - Red Cole catheter only to suction mouth Q8H (do not cut tip), DAVID drain cares   - Decadron q8h x 3 doses      CV/Heme: Keep MAP > 60, SBP > 90 (preferrably > 110)  - Please call ENT on-call before giving fluid bolus, NO vasopressors  - holding PTA lisinopril. SBPs have been wnl without it.   - PTA atenolol transitioned to metoprolol for NG compatibility   - PTA statin   Respiratory: supplemental oxygen as needed   GI: NPO, NG tube placed. Advancing TF to goal. Anticipate transition to " bolus tomorrow   : PTA flomax, transition to doxasosin for NG compatibility   Endocrine: TSH, albumin, prealbumin  ID: Unasyn x 48 hours  Extremities: Q1H CMS checks left arm donor site. Wound vac off POD5 and then daily dressing changes (Xeroform, telfa, kerlix, ace).  STSG donor site (right thigh): Calcium alginate and tegaderm initially. Change as needed. Once drainage stops, cover with tegaderm. If not irritated, can leave open to air and apply aquaphor.  PPX: lovenox   Consults:  PT/OT, Nutrition, PLC (TF)    Patient and plan d/w Dr. Urbano Mcbride MD   ENT

## 2023-03-18 NOTE — PROGRESS NOTES
Facial Plastic and Reconstructive Surgery      Jenaro Kerr is POD2 from left ulnar fasciocutaneous free flap for left buccal and mandibular reconstruction.     He has been doing well with flap checks nicely stable. He has been requiring minimal pain medication, has been ambulating and had a drain removed this morning.     On exam his flap is pink pink, flap check stable, suture line intact. Neck flap, suture line intact. Doppler intact. Central drain with minimal serosanguinous drainage.   Left arm with good hand closure, sensation and cap refill. Wearing splint.   Right thigh skin graft donor site intact. Nasal tube for feeds intact    A/P:  Continue flap checks  Transfer to floor tomorrow  Bolus tube feeds when appropriate  Ambulate  Pain management as needed via tube feed  Will monitor drain output.

## 2023-03-18 NOTE — PLAN OF CARE
Patient is A & O x4, neuros unchanged, sinus rhythm, HR 60s, VSS, on RA, UOP adequate, doppler and flap checks q2 hour, oral flap site with soft and warm, pale pink in color.  Venous and arterial pulses present via doppler.  CMS intact on LUE and RLE, DAVID drain with SS output, wound vac with no output.  Continue with current plan of care and notify MD as needed.    Fely Smiley RN

## 2023-03-18 NOTE — PROGRESS NOTES
Major Shift Events: A&O x4. TREVIÑO. SBA. Pt on RA. LS: clear. SR occasional/ rare PACs. BPs stable. PRN oxycodone given for L forearm graft site pain. TF running @ 60mL/hr. Standard FWF. urinates spontaneously. R radial arterial line removed. x2 PIV SL.  Plan: pt now on q2hour flaps. Floor order tomorrow. For vital signs and complete assessments, please see documentation flowsheets.  Nikita Mcknight RN on 3/18/2023 at 7:01 PM

## 2023-03-18 NOTE — PROGRESS NOTES
"ENT Free Flap Check  March 18, 2023   In Person     S: No concerns with flap per nursing. Patient has been doing very well and is appreciative of cares here. Ambulated with RN twice around the unit     O:/70   Pulse 52   Temp 97.4  F (36.3  C) (Axillary)   Resp 16   Ht 1.778 m (5' 10\")   Wt 85.2 kg (187 lb 13.3 oz)   SpO2 97%   BMI 26.95 kg/m       General: sitting up in chair, no acute distress  HEENT: Strong doppler signal from implantable and handheld Doppler. Intraoral flap pale and warm, soft with suture lines intact. Neck is stable, minimal swelling. Incisions clean and intact. DAVID drain x1 holding suction with appropriate serosanguinous output.  Pulm: Nonlabored breathing on room air  Extremities: Left upper extremity with splint in place, DAVID drain x1 with serosanguinous output. Sensation and distal motor intact, capillary refill < 2 seconds.      A/P: POD2 WLE buccal mucosa, left neck dissection, L UFFF, R thigh STSG, doing very well.    - Continue q6h flap checks     Cheslea Jara MD PGY4  ENT Resident    "

## 2023-03-19 LAB
ANION GAP SERPL CALCULATED.3IONS-SCNC: 10 MMOL/L (ref 7–15)
BUN SERPL-MCNC: 21.4 MG/DL (ref 8–23)
CALCIUM SERPL-MCNC: 8.9 MG/DL (ref 8.8–10.2)
CHLORIDE SERPL-SCNC: 110 MMOL/L (ref 98–107)
CREAT SERPL-MCNC: 0.81 MG/DL (ref 0.67–1.17)
DEPRECATED HCO3 PLAS-SCNC: 27 MMOL/L (ref 22–29)
ERYTHROCYTE [DISTWIDTH] IN BLOOD BY AUTOMATED COUNT: 13 % (ref 10–15)
GFR SERPL CREATININE-BSD FRML MDRD: >90 ML/MIN/1.73M2
GLUCOSE BLDC GLUCOMTR-MCNC: 100 MG/DL (ref 70–99)
GLUCOSE BLDC GLUCOMTR-MCNC: 111 MG/DL (ref 70–99)
GLUCOSE BLDC GLUCOMTR-MCNC: 124 MG/DL (ref 70–99)
GLUCOSE BLDC GLUCOMTR-MCNC: 137 MG/DL (ref 70–99)
GLUCOSE BLDC GLUCOMTR-MCNC: 99 MG/DL (ref 70–99)
GLUCOSE SERPL-MCNC: 145 MG/DL (ref 70–99)
HCT VFR BLD AUTO: 39.2 % (ref 40–53)
HGB BLD-MCNC: 12.6 G/DL (ref 13.3–17.7)
MAGNESIUM SERPL-MCNC: 2.4 MG/DL (ref 1.7–2.3)
MCH RBC QN AUTO: 30.9 PG (ref 26.5–33)
MCHC RBC AUTO-ENTMCNC: 32.1 G/DL (ref 31.5–36.5)
MCV RBC AUTO: 96 FL (ref 78–100)
PHOSPHATE SERPL-MCNC: 3.4 MG/DL (ref 2.5–4.5)
PLATELET # BLD AUTO: 156 10E3/UL (ref 150–450)
POTASSIUM SERPL-SCNC: 3.8 MMOL/L (ref 3.4–5.3)
RBC # BLD AUTO: 4.08 10E6/UL (ref 4.4–5.9)
SODIUM SERPL-SCNC: 147 MMOL/L (ref 136–145)
WBC # BLD AUTO: 10.5 10E3/UL (ref 4–11)

## 2023-03-19 PROCEDURE — 250N000011 HC RX IP 250 OP 636: Performed by: STUDENT IN AN ORGANIZED HEALTH CARE EDUCATION/TRAINING PROGRAM

## 2023-03-19 PROCEDURE — 84100 ASSAY OF PHOSPHORUS: CPT | Performed by: STUDENT IN AN ORGANIZED HEALTH CARE EDUCATION/TRAINING PROGRAM

## 2023-03-19 PROCEDURE — 250N000013 HC RX MED GY IP 250 OP 250 PS 637: Performed by: OTOLARYNGOLOGY

## 2023-03-19 PROCEDURE — 85027 COMPLETE CBC AUTOMATED: CPT | Performed by: STUDENT IN AN ORGANIZED HEALTH CARE EDUCATION/TRAINING PROGRAM

## 2023-03-19 PROCEDURE — 82310 ASSAY OF CALCIUM: CPT | Performed by: STUDENT IN AN ORGANIZED HEALTH CARE EDUCATION/TRAINING PROGRAM

## 2023-03-19 PROCEDURE — 83735 ASSAY OF MAGNESIUM: CPT | Performed by: STUDENT IN AN ORGANIZED HEALTH CARE EDUCATION/TRAINING PROGRAM

## 2023-03-19 PROCEDURE — 250N000013 HC RX MED GY IP 250 OP 250 PS 637: Performed by: STUDENT IN AN ORGANIZED HEALTH CARE EDUCATION/TRAINING PROGRAM

## 2023-03-19 PROCEDURE — 200N000002 HC R&B ICU UMMC

## 2023-03-19 PROCEDURE — 36415 COLL VENOUS BLD VENIPUNCTURE: CPT | Performed by: STUDENT IN AN ORGANIZED HEALTH CARE EDUCATION/TRAINING PROGRAM

## 2023-03-19 RX ADMIN — WHITE PETROLATUM: 1.75 OINTMENT TOPICAL at 21:30

## 2023-03-19 RX ADMIN — CHLORHEXIDINE GLUCONATE 0.12% ORAL RINSE 15 ML: 1.2 LIQUID ORAL at 07:54

## 2023-03-19 RX ADMIN — Medication 6.25 MG: at 20:36

## 2023-03-19 RX ADMIN — ENOXAPARIN SODIUM 40 MG: 40 INJECTION SUBCUTANEOUS at 07:55

## 2023-03-19 RX ADMIN — MOXIFLOXACIN 1 DROP: 5 SOLUTION/ DROPS OPHTHALMIC at 20:36

## 2023-03-19 RX ADMIN — ACETAMINOPHEN 975 MG: 325 TABLET ORAL at 17:14

## 2023-03-19 RX ADMIN — MOXIFLOXACIN 1 DROP: 5 SOLUTION/ DROPS OPHTHALMIC at 08:03

## 2023-03-19 RX ADMIN — WHITE PETROLATUM: 1.75 OINTMENT TOPICAL at 06:13

## 2023-03-19 RX ADMIN — CYCLOSPORINE 1 DROP: 0.5 EMULSION OPHTHALMIC at 07:54

## 2023-03-19 RX ADMIN — DOXAZOSIN 1 MG: 1 TABLET ORAL at 07:54

## 2023-03-19 RX ADMIN — WHITE PETROLATUM: 1.75 OINTMENT TOPICAL at 15:08

## 2023-03-19 RX ADMIN — CYCLOSPORINE 1 DROP: 0.5 EMULSION OPHTHALMIC at 20:30

## 2023-03-19 RX ADMIN — ATORVASTATIN CALCIUM 40 MG: 40 TABLET, FILM COATED ORAL at 07:54

## 2023-03-19 RX ADMIN — ACETAMINOPHEN 975 MG: 325 TABLET ORAL at 07:54

## 2023-03-19 RX ADMIN — Medication 6.25 MG: at 07:54

## 2023-03-19 RX ADMIN — CHLORHEXIDINE GLUCONATE 0.12% ORAL RINSE 15 ML: 1.2 LIQUID ORAL at 17:14

## 2023-03-19 ASSESSMENT — ACTIVITIES OF DAILY LIVING (ADL)
ADLS_ACUITY_SCORE: 21
ADLS_ACUITY_SCORE: 20
ADLS_ACUITY_SCORE: 21
ADLS_ACUITY_SCORE: 20
ADLS_ACUITY_SCORE: 21
ADLS_ACUITY_SCORE: 20
ADLS_ACUITY_SCORE: 21

## 2023-03-19 NOTE — PROGRESS NOTES
"Otolaryngology Progress Note      S: No acute events overnight. Continues to do well. His throat does feel a little raw from the feeding tube. Otherwise pain well controlled.     O: /81   Pulse 65   Temp 97.9  F (36.6  C) (Axillary)   Resp 16   Ht 1.778 m (5' 10\")   Wt 85.2 kg (187 lb 13.3 oz)   SpO2 95%   BMI 26.95 kg/m     General: alert and engaged.    HEENT: L cheek flap appears viable, warm, soft, without evidence of congestion. Strong implantable and handheld doppler signal. Incisions c/d/i. Neck is soft and flat without signs of hematoma. DAVID drains x1 w/s/s output    Pulmonary: NLB on RA   Extremities: L fingers with good cap refill, sensation intact. Splint in place. Wound vac holding suction. DAVID x 1 with s/s output. Right thigh STSG with minimal drainage.     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     Neuro: tylenol jevon, PRN oxy  HEENT:  - Nurse: Q1 hr flap checks per RN x first 24 hours; Q2 hr flap checks per RN x 48 hours; Q4 hr RN checks until discharge. Standard ENT resident flap checks   - Local wound cares to neck (bacitracin QID x 24 hours followed by aquaphor QID). Head in neutral position, HOB at 30 degrees, NO straps around neck, RT@ 72  - Peridex QID  - Red Cole catheter only to suction mouth Q8H (do not cut tip), DAVID drain cares   - s/p decadron q8h x 3 doses      CV/Heme: Keep MAP > 60, SBP > 90 (preferrably > 110)  - holding PTA lisinopril. SBPs have been wnl without it.   - PTA atenolol transitioned to metoprolol for NG compatibility   - PTA statin   Respiratory: supplemental oxygen as needed   GI: NPO, NG tube placed. Transition to bolus today   : PTA flomax, transition to doxasosin for NG " compatibility   Endocrine: TSH, albumin, prealbumin  ID: Unasyn x 48 hours  Extremities:CMS checks left arm donor site. Wound vac off Tuesday and then daily dressing changes (Xeroform, telfa, kerlix, ace).  STSG donor site (right thigh): Calcium alginate and tegaderm initially. Change as needed. Once drainage stops, cover with tegaderm. If not irritated, can leave open to air and apply aquaphor.  PPX: lovenox   Consults:  PT/OT, Nutrition, PLC (TF)-- Tuesday at 1:30   Dispo: plan for discharge Tuesday after PLC     Patient and plan d/w Dr. Urbano Mcbride MD   ENT

## 2023-03-19 NOTE — PLAN OF CARE
Major Shift Events:  VSS. Pain managed with schedule tylenol. Up with sba. Oral flap pale pink with audible doppler and inplanted doppler pulse.  Flap checks q 2hrs.Tolerating bolus feeds thus far. At 2000, patient will need 1.5 can of Osmolite 1.5 for bolus feed. Voiding spontaneously Loose bm x3.    Plan:  Continue with current plan of care. Notify MD with acute changes;  For vital signs and complete assessments, please see documentation flowsheets.

## 2023-03-19 NOTE — PLAN OF CARE
Major Shift Events:  A&O x4. SBA. Pain managed with scheduled tylenol. Pt on RA. LS: clear. SR occasional/ rare PVCs and PACs. BPs stable.  TF running @ 60mL/hr. Standard FWF. Urinates spontaneously.  Plan: pt on q2hour flaps. Possible transfer to floor.  For vital signs and complete assessments, please see documentation flowsheets.

## 2023-03-19 NOTE — PROGRESS NOTES
"ENT Free Flap Check  March 19, 2023   Virtual     S: No concerns with flap per nursing. Denies numbness and weakness in the left arm. JPs x2 with scant serosanguinous output.     O:/74   Pulse (!) 45   Temp 98.6  F (37  C) (Axillary)   Resp 16   Ht 1.778 m (5' 10\")   Wt 85.2 kg (187 lb 13.3 oz)   SpO2 98%   BMI 26.95 kg/m       HEENT: Strong doppler signal from implantable and handheld Doppler.      A/P: POD3 WLE buccal mucosa, left neck dissection, L UFFF, R thigh STSG, doing very well.    - Continue q6h flap checks, next on AM rounds     Chelsea Jara MD PGY4  ENT Resident    "

## 2023-03-20 ENCOUNTER — APPOINTMENT (OUTPATIENT)
Dept: OCCUPATIONAL THERAPY | Facility: CLINIC | Age: 73
DRG: 141 | End: 2023-03-20
Attending: OTOLARYNGOLOGY
Payer: COMMERCIAL

## 2023-03-20 ENCOUNTER — HOME INFUSION (PRE-WILLOW HOME INFUSION) (OUTPATIENT)
Dept: PHARMACY | Facility: CLINIC | Age: 73
End: 2023-03-20
Payer: COMMERCIAL

## 2023-03-20 LAB
ANION GAP SERPL CALCULATED.3IONS-SCNC: 12 MMOL/L (ref 7–15)
BUN SERPL-MCNC: 21.3 MG/DL (ref 8–23)
CALCIUM SERPL-MCNC: 8.9 MG/DL (ref 8.8–10.2)
CHLORIDE SERPL-SCNC: 107 MMOL/L (ref 98–107)
CREAT SERPL-MCNC: 0.82 MG/DL (ref 0.67–1.17)
DEPRECATED HCO3 PLAS-SCNC: 29 MMOL/L (ref 22–29)
ERYTHROCYTE [DISTWIDTH] IN BLOOD BY AUTOMATED COUNT: 12.8 % (ref 10–15)
GFR SERPL CREATININE-BSD FRML MDRD: >90 ML/MIN/1.73M2
GLUCOSE BLDC GLUCOMTR-MCNC: 100 MG/DL (ref 70–99)
GLUCOSE BLDC GLUCOMTR-MCNC: 102 MG/DL (ref 70–99)
GLUCOSE BLDC GLUCOMTR-MCNC: 83 MG/DL (ref 70–99)
GLUCOSE SERPL-MCNC: 106 MG/DL (ref 70–99)
HCT VFR BLD AUTO: 38.9 % (ref 40–53)
HGB BLD-MCNC: 12.5 G/DL (ref 13.3–17.7)
MAGNESIUM SERPL-MCNC: 2.1 MG/DL (ref 1.7–2.3)
MCH RBC QN AUTO: 30.9 PG (ref 26.5–33)
MCHC RBC AUTO-ENTMCNC: 32.1 G/DL (ref 31.5–36.5)
MCV RBC AUTO: 96 FL (ref 78–100)
PHOSPHATE SERPL-MCNC: 4 MG/DL (ref 2.5–4.5)
PLATELET # BLD AUTO: 160 10E3/UL (ref 150–450)
POTASSIUM SERPL-SCNC: 5 MMOL/L (ref 3.4–5.3)
RBC # BLD AUTO: 4.04 10E6/UL (ref 4.4–5.9)
SODIUM SERPL-SCNC: 148 MMOL/L (ref 136–145)
WBC # BLD AUTO: 8.7 10E3/UL (ref 4–11)

## 2023-03-20 PROCEDURE — 97535 SELF CARE MNGMENT TRAINING: CPT | Mod: GO

## 2023-03-20 PROCEDURE — 250N000011 HC RX IP 250 OP 636: Performed by: STUDENT IN AN ORGANIZED HEALTH CARE EDUCATION/TRAINING PROGRAM

## 2023-03-20 PROCEDURE — 82310 ASSAY OF CALCIUM: CPT | Performed by: STUDENT IN AN ORGANIZED HEALTH CARE EDUCATION/TRAINING PROGRAM

## 2023-03-20 PROCEDURE — 36415 COLL VENOUS BLD VENIPUNCTURE: CPT | Performed by: STUDENT IN AN ORGANIZED HEALTH CARE EDUCATION/TRAINING PROGRAM

## 2023-03-20 PROCEDURE — 97530 THERAPEUTIC ACTIVITIES: CPT | Mod: GO

## 2023-03-20 PROCEDURE — 83735 ASSAY OF MAGNESIUM: CPT | Performed by: STUDENT IN AN ORGANIZED HEALTH CARE EDUCATION/TRAINING PROGRAM

## 2023-03-20 PROCEDURE — 85027 COMPLETE CBC AUTOMATED: CPT | Performed by: STUDENT IN AN ORGANIZED HEALTH CARE EDUCATION/TRAINING PROGRAM

## 2023-03-20 PROCEDURE — 250N000013 HC RX MED GY IP 250 OP 250 PS 637: Performed by: OTOLARYNGOLOGY

## 2023-03-20 PROCEDURE — 84100 ASSAY OF PHOSPHORUS: CPT | Performed by: STUDENT IN AN ORGANIZED HEALTH CARE EDUCATION/TRAINING PROGRAM

## 2023-03-20 PROCEDURE — 97110 THERAPEUTIC EXERCISES: CPT | Mod: GO

## 2023-03-20 PROCEDURE — 250N000013 HC RX MED GY IP 250 OP 250 PS 637: Performed by: STUDENT IN AN ORGANIZED HEALTH CARE EDUCATION/TRAINING PROGRAM

## 2023-03-20 PROCEDURE — 200N000002 HC R&B ICU UMMC

## 2023-03-20 RX ADMIN — Medication 6.25 MG: at 20:10

## 2023-03-20 RX ADMIN — OXYCODONE HYDROCHLORIDE 5 MG: 5 TABLET ORAL at 02:52

## 2023-03-20 RX ADMIN — OXYCODONE HYDROCHLORIDE 5 MG: 5 TABLET ORAL at 23:38

## 2023-03-20 RX ADMIN — CHLORHEXIDINE GLUCONATE 0.12% ORAL RINSE 15 ML: 1.2 LIQUID ORAL at 23:38

## 2023-03-20 RX ADMIN — ACETAMINOPHEN 975 MG: 325 TABLET ORAL at 23:38

## 2023-03-20 RX ADMIN — ATORVASTATIN CALCIUM 40 MG: 40 TABLET, FILM COATED ORAL at 08:16

## 2023-03-20 RX ADMIN — CHLORHEXIDINE GLUCONATE 0.12% ORAL RINSE 15 ML: 1.2 LIQUID ORAL at 15:41

## 2023-03-20 RX ADMIN — WHITE PETROLATUM: 1.75 OINTMENT TOPICAL at 15:42

## 2023-03-20 RX ADMIN — CHLORHEXIDINE GLUCONATE 0.12% ORAL RINSE 15 ML: 1.2 LIQUID ORAL at 00:32

## 2023-03-20 RX ADMIN — ACETAMINOPHEN 975 MG: 325 TABLET ORAL at 15:41

## 2023-03-20 RX ADMIN — CHLORHEXIDINE GLUCONATE 0.12% ORAL RINSE 15 ML: 1.2 LIQUID ORAL at 08:16

## 2023-03-20 RX ADMIN — MOXIFLOXACIN 1 DROP: 5 SOLUTION/ DROPS OPHTHALMIC at 08:17

## 2023-03-20 RX ADMIN — MOXIFLOXACIN 1 DROP: 5 SOLUTION/ DROPS OPHTHALMIC at 20:18

## 2023-03-20 RX ADMIN — WHITE PETROLATUM: 1.75 OINTMENT TOPICAL at 23:41

## 2023-03-20 RX ADMIN — CYCLOSPORINE 1 DROP: 0.5 EMULSION OPHTHALMIC at 20:10

## 2023-03-20 RX ADMIN — CYCLOSPORINE 1 DROP: 0.5 EMULSION OPHTHALMIC at 08:16

## 2023-03-20 RX ADMIN — WHITE PETROLATUM: 1.75 OINTMENT TOPICAL at 08:19

## 2023-03-20 RX ADMIN — ACETAMINOPHEN 975 MG: 325 TABLET ORAL at 00:31

## 2023-03-20 RX ADMIN — ENOXAPARIN SODIUM 40 MG: 40 INJECTION SUBCUTANEOUS at 08:16

## 2023-03-20 RX ADMIN — DOXAZOSIN 1 MG: 1 TABLET ORAL at 08:16

## 2023-03-20 RX ADMIN — ACETAMINOPHEN 975 MG: 325 TABLET ORAL at 08:16

## 2023-03-20 RX ADMIN — LISINOPRIL 30 MG: 20 TABLET ORAL at 08:16

## 2023-03-20 ASSESSMENT — ACTIVITIES OF DAILY LIVING (ADL)
ADLS_ACUITY_SCORE: 21
ADLS_ACUITY_SCORE: 20
ADLS_ACUITY_SCORE: 21
ADLS_ACUITY_SCORE: 20
ADLS_ACUITY_SCORE: 21
DEPENDENT_IADLS:: INDEPENDENT
ADLS_ACUITY_SCORE: 20
ADLS_ACUITY_SCORE: 20
ADLS_ACUITY_SCORE: 21
ADLS_ACUITY_SCORE: 20
ADLS_ACUITY_SCORE: 20

## 2023-03-20 ASSESSMENT — VISUAL ACUITY
OU: GLASSES;BASELINE

## 2023-03-20 NOTE — PROGRESS NOTES
Therapy: Enteral tube feeds  Insurance: [facility] Medicare Advantage    Patient meets Medicare criteria for enteral tube feeds and will have coverage through their [facility] Medicare Advantage plan. Anticipated JEREMIAH of 90 days or more must be documented in patient s medical chart and discharge summary in order for Medicare to cover.    Co-Insurance: 80/20  Max Out of Pocket: $3000  Met: $130    Nursing is covered if patient is homebound (outside agency would be utilized as I is not Medicare contracted). If not homebound there is no coverage and I can see patient if patient agrees to self pay $90 per visit.     [facility] in reference to admission date 03/16/2023 to check Enteral tube feeds coverage.    Please contact Intake with any questions, [phone] or In Basket pool,  Home Infusion ([phone]).

## 2023-03-20 NOTE — PROGRESS NOTES
ENT Free flap check     S: Doing well, tolerating bolus tube feeds without issue. No flap concerns. Excited to be nearing discharge home.    O: Vitals reviewed and stable   Alert and engaged   Oral cavity with appropriately pale flap along gingivobuccal sulcus. Strong implantable and handheld Doppler. Incisions intact.  Neck soft. DAVID 1 with s/s output   Left hand cms intact. Wound vac and DAVID drain x1     A/P: stable flap  - transfer to floor     Winifred Mcbride MD   ENT

## 2023-03-20 NOTE — PLAN OF CARE
Major Shift Events:  A&O x4. SBA. Pain managed with scheduled tylenol and PRN oxycodone. Pt on RA. LS clear. SR occasional/ rare PVCs and PACs. BPs stable.   Urinates spontaneously.  Plan: pt on q4hour flaps. Transfer to floor.  For vital signs and complete assessments, please see documentation flowsheets.

## 2023-03-20 NOTE — PROGRESS NOTES
"Otolaryngology Progress Note      S: Feeling ok, throat pain has worsened today making swallowing more uncomfortable. His back is also sore. Otherwise doing well, hoping to learn cares today given anticipated discharge tomorrow. No N/V with bolus TF.     O: /87   Pulse 50   Temp 97.3  F (36.3  C) (Axillary)   Resp 16   Ht 1.778 m (5' 10\")   Wt 85.2 kg (187 lb 13.3 oz)   SpO2 97%   BMI 26.95 kg/m     General: alert and engaged.    HEENT: L cheek flap appears viable, warm, soft, without evidence of congestion. Strong implantable and handheld doppler signal. Incisions c/d/i. Neck is soft and flat without signs of hematoma. DAVID drains x1 w/s/s output    Pulmonary: NLB on RA   Extremities: L fingers with good cap refill, sensation intact. Splint in place. Wound vac holding suction. DAVID x 1 with s/s output. Right thigh STSG with minimal drainage.     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     Neuro: tylenol jevon, PRN oxy  HEENT:  - Nurse: Q1 hr flap checks per RN x first 24 hours; Q2 hr flap checks per RN x 48 hours; Q4 hr RN checks until discharge. Standard ENT resident flap checks   - Local wound cares to neck (bacitracin QID x 24 hours followed by aquaphor QID). Head in neutral position, HOB at 30 degrees, NO straps around neck, RT@ 72  - Peridex QID  - Red Cole catheter only to suction mouth Q8H (do not cut tip), DAVID drain cares (neck DAVID in for fistula monitoring.   - s/p decadron q8h x 3 doses      CV/Heme: Keep MAP > 60, SBP > 90 (preferrably > 110)  - Resuming lisinopril today, PTA atenolol transitioned to metoprolol for NG compatibility, PTA statin   Respiratory: supplemental oxygen as needed   GI: NPO, NG tube, tolerating " bolus TF.  : PTA flomax, transition to doxasosin for NG compatibility   Endocrine: TSH, albumin, prealbumin  ID: Unasyn x 48 hours  Extremities:CMS checks left arm donor site. Wound vac off Tuesday and then daily dressing changes (Xeroform, telfa, kerlix, ace).  STSG donor site (right thigh): Calcium alginate and tegaderm initially. Change as needed. Once drainage stops, cover with tegaderm. If not irritated, can leave open to air and apply aquaphor.  PPX: lovenox   Consults:  PT/OT, Nutrition, PLC (TF)-- Tuesday at 1:30   Dispo: plan for discharge Tuesday after PLC     Patient and plan d/w Dr. Urbano Mcbride MD   ENT     ADDENDUM:  Patient will require wound care supplies for left forearm surgical wound upon discharge.     Wound type: surgical  Wound dimensions: 6 x 5 x 0.25 cm   Drainage: scant serosanguinous  Dressing type: primary   Frequency of dressing changes: daily  Duration of dressings: 30 days    Olga Escobar PA-C  Otolaryngology-Head & Neck Surgery  Please contact ENT by dialing [phone] and entering job code [phone].

## 2023-03-20 NOTE — PLAN OF CARE
Major Shift Events:      I/A:  Neuro: A&OX4, PERRLA, moves all extremities. Denies pain.  Pulm: Lungs clear, O2S 97% on room air.  CV: HR 55-60s SB/Sr when on tele. -150s. Afebrile.  PV: 2+ radial and DP pulses. Receiving Lovenox.  GI: Soft abdomen. Several BMs today. NGT in place, on bolus feeds TID. Teaching with teach back performed with patient and his wife on flushing NGT and initiating bolus feeds.  : Voids spontaneously without difficulty.  Skin: Left mouth flap pale pink, soft, strong doppler signal (Q4h checks). Neck DAVID removed by ENT. L arm free flap site wrapped + splint + wound vac + DAVID. R thigh graft site. See flowsheets.  MS: Up with SBA for line assistance. Steady.  Lines: PIV.  Psych/Social: Updated patient's wife at bedside--supportive of patient and POC.  Goal Outcome Evaluation: Plan of Care Reviewed With: patient, spouse. Overall Patient Progress: improving    Plan: Continue to monitor, Q4h flap checks, notify ENT of any concerns, transfer to  when bed available.  For vital signs and complete assessments, please see documentation flowsheets.

## 2023-03-20 NOTE — PLAN OF CARE
Occupational Therapy Discharge Summary    Reason for therapy discharge:    All goals and outcomes met, no further needs identified.    Progress towards therapy goal(s). See goals on Care Plan in [facility] electronic health record for goal details.  Goals met    Therapy recommendation(s):    Continue home exercise program.

## 2023-03-20 NOTE — CONSULTS
Care Management Initial Consult    General Information  Assessment completed with: Patient, Spouse or significant other, Veronika  Type of CM/SW Visit: Initial Assessment    Primary Care Provider verified and updated as needed: Yes   Readmission within the last 30 days: no previous admission in last 30 days         Advance Care Planning: Advance Care Planning Reviewed: no concerns identified        Communication Assessment  Patient's communication style: spoken language (English or Bilingual)    Hearing Difficulty or Deaf: no   Wear Glasses or Blind: no    Cognitive  Cognitive/Neuro/Behavioral: WDL, all  Level of Consciousness: alert  Arousal Level: opens eyes spontaneously  Orientation: oriented x 4  Mood/Behavior: calm, cooperative, behavior appropriate to situation  Best Language: 0 - No aphasia  Speech: clear, spontaneous, logical    Living Environment:   People in home: spouse  Veronika  Current living Arrangements: house      Able to return to prior arrangements: yes     Family/Social Support:  Care provided by: self  Provides care for: no one  Marital Status:   Wife  Veronika       Description of Support System: Supportive, Involved       Current Resources:   Patient receiving home care services: No     Community Resources: None  Equipment currently used at home: none  Supplies currently used at home: None    Employment/Financial:  Employment Status: retired        Financial Concerns: No concerns identified   Referral to Financial Worker: No     Lifestyle & Psychosocial Needs:  Social Determinants of Health     Tobacco Use: Medium Risk     Smoking Tobacco Use: Former     Smokeless Tobacco Use: Never     Passive Exposure: Not on file   Alcohol Use: Not on file   Financial Resource Strain: Not on file   Food Insecurity: Not on file   Transportation Needs: Not on file   Physical Activity: Not on file   Stress: Not on file   Social Connections: Not on file   Intimate Partner Violence: Not on file   Depression: Not  at risk     PHQ-2 Score: 0   Housing Stability: Not on file     Functional Status:  Prior to admission patient needed assistance:   Dependent ADLs:: Independent  Dependent IADLs:: Independent     Mental Health Status:  Mental Health Status: No Current Concerns       Chemical Dependency Status:  Chemical Dependency Status: No Current Concerns        Additional Information:  Per chart review and discussion with the team, pt will need TF and dressing supplies arranged for home.    RNCC met with pt and spouse to introduce RNCC role and discuss about discharge planning.  Pt and spouse stated the team have been updating them well about the plan of care and very appreciative about the care he is receiving.  Pt lives with spouse.  Pt stated he was ind. with all cares, mobility and driving prior hospitalization.  RNCC discussed about home TF and dressing supplies needs.  Pt stated he never used any home infusion company and DME companies in the past.  Pt agreed referral to be send to any company that can provided the service.  RNCC sent TF referral to [facility] and requested coverage info.  Dressing supplies referral sent to [facility].  Pt stated he will be able to do the dressing change and the TF once he get the education and declined for home RN needs.  Pt spouse will assist with the care as needed.  Pt and spouse have PLC appointment for tomorrow, 3/21.  Awaiting coverage info for TF and dressing supplies.    Addendum: 4:10  [facility] reported pt doesn't meet medicare guideline.  TF need to be for more than 90 days to meet medicare guideline.  Per [facility],pt will be private pay for TF. Self pay cost for Osmolite 1.5 at 6 cans daily comes to approximately $40.92/day for formula and supplies with a monthly Enteral pump rental charge of $56.88.   [facility] have sent the referral to [facility] to have insurance coverage checked and compare private pay cost.  [facility] is checking insurance coverage.    [facility] agreed to provide dressing  supplies.  [facility] will deliver dressing supplies to pt home, Wednesday, 3/22 or Thursday, 3/23.  Pt will need to be sent home with 2 days supplies until he receives his supplies.  RNCC visited pt and share the above info.    Awaiting TF coverage info from [place] [facility].      Carrington Garrido RN, PNH, BSN  4A and 4E/ ICU  Care Coordinator  Phone: [phone]  Pager: [phone]    To contact the weekend RNCC  [place] (0800 [phone] 1630) Saturday and Sunday   Units: 4A, 4C, 4E, 5A and 5B- Pager [phone]   Units: 6A, 6B- Pager [phone]   Unit : 6C, 6D- pager [phone]   Units: 7A, 7B, 7C, 7D, and 5C- Pager [phone]

## 2023-03-21 ENCOUNTER — APPOINTMENT (OUTPATIENT)
Dept: EDUCATION SERVICES | Facility: CLINIC | Age: 73
DRG: 141 | End: 2023-03-21
Attending: STUDENT IN AN ORGANIZED HEALTH CARE EDUCATION/TRAINING PROGRAM
Payer: COMMERCIAL

## 2023-03-21 VITALS
HEART RATE: 69 BPM | SYSTOLIC BLOOD PRESSURE: 138 MMHG | TEMPERATURE: 96.9 F | WEIGHT: 179.68 LBS | OXYGEN SATURATION: 97 % | HEIGHT: 70 IN | BODY MASS INDEX: 25.72 KG/M2 | RESPIRATION RATE: 16 BRPM | DIASTOLIC BLOOD PRESSURE: 93 MMHG

## 2023-03-21 LAB
ANION GAP SERPL CALCULATED.3IONS-SCNC: 8 MMOL/L (ref 7–15)
BUN SERPL-MCNC: 21.6 MG/DL (ref 8–23)
CALCIUM SERPL-MCNC: 9 MG/DL (ref 8.8–10.2)
CHLORIDE SERPL-SCNC: 109 MMOL/L (ref 98–107)
CREAT SERPL-MCNC: 0.77 MG/DL (ref 0.67–1.17)
DEPRECATED HCO3 PLAS-SCNC: 28 MMOL/L (ref 22–29)
ERYTHROCYTE [DISTWIDTH] IN BLOOD BY AUTOMATED COUNT: 12.6 % (ref 10–15)
GFR SERPL CREATININE-BSD FRML MDRD: >90 ML/MIN/1.73M2
GLUCOSE SERPL-MCNC: 100 MG/DL (ref 70–99)
HCT VFR BLD AUTO: 40.8 % (ref 40–53)
HGB BLD-MCNC: 13.1 G/DL (ref 13.3–17.7)
MAGNESIUM SERPL-MCNC: 2.1 MG/DL (ref 1.7–2.3)
MCH RBC QN AUTO: 30.7 PG (ref 26.5–33)
MCHC RBC AUTO-ENTMCNC: 32.1 G/DL (ref 31.5–36.5)
MCV RBC AUTO: 96 FL (ref 78–100)
PHOSPHATE SERPL-MCNC: 3 MG/DL (ref 2.5–4.5)
PLATELET # BLD AUTO: 165 10E3/UL (ref 150–450)
POTASSIUM SERPL-SCNC: 4.8 MMOL/L (ref 3.4–5.3)
RBC # BLD AUTO: 4.27 10E6/UL (ref 4.4–5.9)
SODIUM SERPL-SCNC: 145 MMOL/L (ref 136–145)
WBC # BLD AUTO: 8.6 10E3/UL (ref 4–11)

## 2023-03-21 PROCEDURE — 250N000011 HC RX IP 250 OP 636: Performed by: STUDENT IN AN ORGANIZED HEALTH CARE EDUCATION/TRAINING PROGRAM

## 2023-03-21 PROCEDURE — 36415 COLL VENOUS BLD VENIPUNCTURE: CPT | Performed by: STUDENT IN AN ORGANIZED HEALTH CARE EDUCATION/TRAINING PROGRAM

## 2023-03-21 PROCEDURE — 85014 HEMATOCRIT: CPT | Performed by: STUDENT IN AN ORGANIZED HEALTH CARE EDUCATION/TRAINING PROGRAM

## 2023-03-21 PROCEDURE — 250N000013 HC RX MED GY IP 250 OP 250 PS 637: Performed by: STUDENT IN AN ORGANIZED HEALTH CARE EDUCATION/TRAINING PROGRAM

## 2023-03-21 PROCEDURE — 250N000013 HC RX MED GY IP 250 OP 250 PS 637: Performed by: OTOLARYNGOLOGY

## 2023-03-21 PROCEDURE — 84100 ASSAY OF PHOSPHORUS: CPT | Performed by: OTOLARYNGOLOGY

## 2023-03-21 PROCEDURE — 80048 BASIC METABOLIC PNL TOTAL CA: CPT | Performed by: STUDENT IN AN ORGANIZED HEALTH CARE EDUCATION/TRAINING PROGRAM

## 2023-03-21 PROCEDURE — 83735 ASSAY OF MAGNESIUM: CPT | Performed by: OTOLARYNGOLOGY

## 2023-03-21 RX ORDER — DOXAZOSIN 1 MG/1
1 TABLET ORAL DAILY
Qty: 14 TABLET | Refills: 0 | Status: SHIPPED | OUTPATIENT
Start: 2023-03-22 | End: 2023-04-26

## 2023-03-21 RX ORDER — ACETAMINOPHEN 325 MG/1
650 TABLET ORAL EVERY 4 HOURS PRN
Qty: 50 TABLET | Refills: 0 | Status: SHIPPED | OUTPATIENT
Start: 2023-03-21 | End: 2023-04-26

## 2023-03-21 RX ORDER — OXYCODONE HYDROCHLORIDE 5 MG/1
5 TABLET ORAL EVERY 4 HOURS PRN
Qty: 20 TABLET | Refills: 0 | Status: SHIPPED | OUTPATIENT
Start: 2023-03-21 | End: 2023-03-31

## 2023-03-21 RX ORDER — SENNOSIDES 8.6 MG
1-2 TABLET ORAL 2 TIMES DAILY PRN
Qty: 30 TABLET | Refills: 0 | Status: SHIPPED | OUTPATIENT
Start: 2023-03-21 | End: 2023-04-26

## 2023-03-21 RX ORDER — MINERAL OIL/HYDROPHIL PETROLAT
OINTMENT (GRAM) TOPICAL EVERY 8 HOURS
Qty: 99 G | Refills: 0 | Status: SHIPPED | OUTPATIENT
Start: 2023-03-21 | End: 2023-07-12

## 2023-03-21 RX ORDER — METOPROLOL TARTRATE 25 MG/1
6.25 TABLET, FILM COATED ORAL 2 TIMES DAILY
Qty: 7 TABLET | Refills: 0 | Status: SHIPPED | OUTPATIENT
Start: 2023-03-21 | End: 2023-04-26

## 2023-03-21 RX ORDER — CHLORHEXIDINE GLUCONATE ORAL RINSE 1.2 MG/ML
15 SOLUTION DENTAL EVERY 8 HOURS
Qty: 473 ML | Refills: 0 | Status: SHIPPED | OUTPATIENT
Start: 2023-03-21 | End: 2023-03-30

## 2023-03-21 RX ADMIN — SENNOSIDES 8.6 MG: 8.6 TABLET, FILM COATED ORAL at 08:28

## 2023-03-21 RX ADMIN — CHLORHEXIDINE GLUCONATE 0.12% ORAL RINSE 15 ML: 1.2 LIQUID ORAL at 08:28

## 2023-03-21 RX ADMIN — Medication 6.25 MG: at 08:27

## 2023-03-21 RX ADMIN — CYCLOSPORINE 1 DROP: 0.5 EMULSION OPHTHALMIC at 08:28

## 2023-03-21 RX ADMIN — DOXAZOSIN 1 MG: 1 TABLET ORAL at 08:28

## 2023-03-21 RX ADMIN — LISINOPRIL 30 MG: 20 TABLET ORAL at 08:28

## 2023-03-21 RX ADMIN — WHITE PETROLATUM: 1.75 OINTMENT TOPICAL at 08:30

## 2023-03-21 RX ADMIN — POLYETHYLENE GLYCOL 3350 17 G: 17 POWDER, FOR SOLUTION ORAL at 08:29

## 2023-03-21 RX ADMIN — ENOXAPARIN SODIUM 40 MG: 40 INJECTION SUBCUTANEOUS at 08:29

## 2023-03-21 RX ADMIN — MOXIFLOXACIN 1 DROP: 5 SOLUTION/ DROPS OPHTHALMIC at 09:05

## 2023-03-21 RX ADMIN — ACETAMINOPHEN 975 MG: 325 TABLET ORAL at 08:28

## 2023-03-21 RX ADMIN — ATORVASTATIN CALCIUM 40 MG: 40 TABLET, FILM COATED ORAL at 08:28

## 2023-03-21 ASSESSMENT — ACTIVITIES OF DAILY LIVING (ADL)
ADLS_ACUITY_SCORE: 20

## 2023-03-21 NOTE — PROGRESS NOTES
"ENT Free Flap Check  03/20/2023  In Person     S: No concerns with flap per nursing. Patient is doing well.      O:/88 (BP Location: Right arm)   Pulse 65   Temp 97.7  F (36.5  C) (Axillary)   Resp 18   Ht 1.778 m (5' 10\")   Wt 85.2 kg (187 lb 13.3 oz)   SpO2 97%   BMI 26.95 kg/m       General: laying in bed, no acute distress  HEENT: Strong doppler signal from implantable and handheld Doppler. Intraoral flap pale and warm, soft with suture lines intact. Neck is stable, minimal swelling. Incisions clean and intact. DAVID drain x1 holding suction with appropriate serosanguinous output.  Pulm: Nonlabored breathing on room air  Extremities: Left upper extremity with splint in place, DAVID drain x1 with serosanguinous output. Sensation and distal motor intact, capillary refill < 2 seconds.      A/P: POD2 WLE buccal mucosa, left neck dissection, L UFFF, R thigh STSG, doing very well.  - Next flap check AM rounds    Brenna Garcia MD      "

## 2023-03-21 NOTE — DISCHARGE SUMMARY
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 ORAL CAVITY, LEFT MANDIBULAR ALVEOLUS, WIDE LOCAL EXCISION :  - KERATINIZING SQUAMOUS CELL CARCINOMA, MODERATELY DIFFERENTIATED  - Tumor size: 3.0 cm in greatest dimension  DOI: 7.0 mm  - No definitive lymphovascular invasion is identified  - Perineural invasion is not identified  - Medial mucosal resection margin is involved by carcinoma  - Anterior, posterior, lateral and deep margins are negative (Closest deep margin < 1mm)  -AJCC pathologic stage: pT3, N2b    B. MANDIBLE, MARGINAL MANDIBULECTOMY:  - Bone tissue, negative for malignancy    C. ORAL CAVITY, MEDIAL RERESECTION:  - Negative for carcinoma    D. ORAL CAVITY, MEDIAL MARGIN #2, EXCISION:  - Negative for carcinoma    E. LYMPH NODE, LEFT 1B, EXCISION:  - METASTATIC SQUAMOUS CELL CARCINOMA WITH CYSTIC CHANGES TO FOUR OUT OF SIX LYMPH NODES (4/6)  - Largest tumor deposit size: 0.9 cm  - No extranodal extension is identified  - Benign salivary gland tissue    F. LYMPH NODE, LEFT 2A, EXCISION:  - Six lymph nodes, negative for carcinoma (0/6)    G. LYMPH NODE, LEFT 2B, EXCISION:  - Seven lymph nodes, negative for carcinoma (0/7)    H. LYMPH NODE, LEFT 3, EXCISION:  - Nine lymph nodes, negative for carcinoma (0/9)    I. LYMPH NODE, LEFT 4, EXCISION:  - Fourteen lymph nodes, negative for carcinoma  (0/14)    J. LYMPH NODE, PERIFACIAL, EXCISION:  - Benign fragments of salivary gland tissue  - No lymph node tissue present         HPI: Jenaro Kerr is a 73 year old male with history of lichen planus along the buccal mucosa for 15 years who was found to have SCC along that site after a biopsy before dental implantation. He underwent complete oncologic work up and was discussed at Head and Neck Multidisciplinary Tumor Board where the above stated procedure was recommended. This was discussed at length with the patient, as well as the alternatives, with the risks and benefits of each. After consideration and understanding they consented to proceeding.     Hospital Course: The patient was admitted to the hospital and underwent the above mentioned procedure. He tolerated the procedure without any intra- or kenny-operative complications. Please see the operative report for full details of the procedure. The patient was admitted for post-operative monitoring. His postoperative course was uneventful. At discharge, the patient's pain was well controlled, the patient was voiding on his own, and was ambulating and tolerating a bolus tube feeding diet via nasogastric feeding tube. He attended patient learning center to lear tube feeding cares and felt comfortable performing these cares at home.      Discharge Exam:  Vitals:    03/20/23 1600 03/20/23 1800 03/20/23 2335 03/21/23 0823   BP:   119/72 (!) 138/93   BP Location:    Right arm   Cuff Size:    Adult Regular   Pulse: 65  55 69   Resp:   16 16   Temp:   97.5  F (36.4  C) 96.9  F (36.1  C)   TempSrc:   Axillary Axillary   SpO2: 95% 97% 96% 97%   Weight:       Height:          General: alert and engaged.                  HEENT: L oral flap appears viable, warm, soft, without evidence of congestion. Strong implantable and handheld doppler signal. Incisions c/d/i. Neck is soft and flat without signs of hematoma.                  Pulmonary: NLB on RA                  Extremities: L fingers with good cap refill, sensation intact. Splint in place. Right thigh STSG with minimal drainage.     Discharge Medications:     Medication List      Started    acetaminophen 325 MG tablet  Commonly known as: TYLENOL  650 mg, Per Feeding Tube, EVERY 4 HOURS PRN     chlorhexidine 0.12 % solution  Commonly known as: PERIDEX  15 mLs, Swish & Spit, EVERY 8 HOURS     doxazosin 1 MG tablet  Commonly known as: CARDURA  1 mg, Oral or Feeding Tube, DAILY  Start taking on: March 22, 2023     metoprolol tartrate 25 MG tablet  Commonly known as: LOPRESSOR  6.25 mg, Oral or Feeding Tube, 2 TIMES DAILY     mineral oil-hydrophilic petrolatum external ointment  Topical, EVERY 8 HOURS     oxyCODONE 5 MG tablet  Commonly known as: ROXICODONE  5 mg, Per NG tube, EVERY 4 HOURS PRN     sennosides 8.6 MG tablet  Commonly known as: SENOKOT  1-2 tablets, Per Feeding Tube, 2 TIMES DAILY PRN     sodium chloride 0.65 % nasal spray  Commonly known as: OCEAN  1 spray, Both Nostrils, EVERY 1 HOUR PRN        Modified    EYE VITAMINS PO  What changed: Another medication with the same name was removed. Continue taking this medication, and follow the directions you see here.        Discontinued    atenolol 25 MG tablet  Commonly known as: TENORMIN     mupirocin 2 % external ointment  Commonly known as: BACTROBAN     tamsulosin 0.4 MG capsule  Commonly known as: FLOMAX            Discharge Procedure Orders   Reason for your hospital stay   Order Comments: Post-operative care     Activity   Order Comments: Your activity upon discharge: No heavy lifting greater than 10 lbs and no strenuous exercise for 2 weeks or until follow up appointment. No driving while taking narcotic pain medications.     Order Specific Question Answer Comments   Is discharge order? Yes      Adult [facility] [[email or web address] Follow-up and recommended labs and tests   Order Comments: Follow up in ENT clinic with Dr. Clement's nurse on 3/29/23 at 1:00PM. Please call the  "clinic with questions/concerns: [phone].    Otolaryngology/ENT Clinic:  [facility]  [address] [place] [address]      Appointments on [place] and/or [place] campus (with [facility] or [facility] provider or service). Call [phone] if you haven't heard regarding these appointments within 7 days of discharge.     When to contact your care team   Order Comments: Please notify your doctor if you experience wound breakdown, sustained bleeding from the wound site, or increasing redness, swelling, and/or purulent malorodorous discharge from the wound site which may indicate infection. If you feel it is acute, or experience sudden changes in breathing, chest pain, or excessive sleepiness/somnolence please return to the emergency department or call [phone]. If you have questions or concerns during the day please call ENT clinic and [phone]. If at night you can call [facility] at [phone] and ask for the \"ENT resident on call\".     Wound care and dressings   Order Comments: Instructions to care for your wound at home:Keep incisions clean and dry. Apply Aquaphor ointment to incisions three times daily to keep moist. You may shower, do not soak, scrub, or submerge incisions under water. If you have a surgical drain, do not get the drain site wet until 24 hours after the drain has been removed.     Daily dressing changes to left forearm: Remove old dressing. Clean incisions with saline. Apply Aquaphor ointment to linear incision and incision edges around skin graft site. Cover skin graft with xeroform gauze. Cover linear incision with Telfa non-stick dressing. Wrap forearm with Kerlix gauze roll and cover with ACE wrap. Replace black wrist splint.    Skin graft donor site care: You have a dressing over your leg where the skin graft was taken from. Keep dressing in place and change as needed for drainage under the dressing. Place a calcium " "alginate pad over the area and cover with clear tegaderm dressing. Once the site is no longer draining you may leave it open to air and apply Aquaphor or a mild unscented lotion to the area to keep it moist.     Miscellaneous DME   Order Comments: Equipment being ordered: Wound care supplies, 1 each daily x 30 days  Xeroform occlusive gauze 5\" x 9\"  Telfa non-adherent pad 8\" x 3\"  Kerlix bandage roll 4-1/2\" x 4-1/8 yd  ACE wrap, 6 inch (6 total)    Diagnosis: SCC of buccal mucosa s/p free flap    I, the undersigned, certify that the above prescribed supplies are medically necessary for this patient and is both reasonable and necessary in reference to accepted standards of medical and necessary in reference to accepted standards of medical practice in the treatment of this patient's condition and is not prescribed as a convenience.     Order Specific Question Answer Comments   DME Provider: [facility]    Start Date: 3/20/2023    DME Item Needed: Wound care supplies    Length of Need: 30 days      Miscellaneous DME   Order Comments: Equipment being ordered: Nasogastric bolus tube feeding supplies  Formula: Osmolite 1.5, 6 cans per day (or equivalent formula)  Gravity feeding bags  60 mL syringes    Treatment Diagnosis: SCC of buccal mucosa s/p free flap    I, the undersigned, certify that the above prescribed supplies are medically necessary for this patient and is both reasonable and necessary in reference to accepted standards of medical and necessary in reference to accepted standards of medical practice in the treatment of this patient's condition and is not prescribed as a convenience.     Order Specific Question Answer Comments   DME Provider: Other (comments) [facility]- [phone]   Start Date: 3/20/2023    DME Item Needed: Tube feed supplies    Length of Need: 30 days      Diet   Order Comments: Follow this diet upon discharge: Nothing to eat or drink by  mouth. Bolus tube feeding via nasogastric " feeding tube. Formula: Osmolie 1.5, 6 cans per day. Give 2 cans per feeding, 3 feedings per day. Separate feedings by 3-4 hours. Flush tube with 120 mL before and after each feeding. Flush tube with 15-30 mL of water before and after medications.     Order Specific Question Answer Comments   Is discharge order? Yes        Dispo: To home in good condition. All of the patient's questions/concerns have been addressed at this time.     Olga Escobar PA-C  Otolaryngology-Head & Neck Surgery  Please contact ENT by dialing [phone] and entering job code [phone].

## 2023-03-21 NOTE — PROGRESS NOTES
Care Management Discharge Note    Discharge Date: 03/22/2023     Discharge Disposition:  Home with family    Discharge Services:  Home infusion for TF    Discharge DME:  Dressing supplies    Discharge Transportation: family or friend will provide      Education Provided on the Discharge Plan:Yes     Persons Notified of Discharge Plans: pt, bedside RN and ENT team  Patient/Family in Agreement with the Plan:  Yes    Handoff Referral Completed: Yes    Additional Information:  [facility] reported pt has coverage for TF 80/20.   Insurance will pay 80% and pt pays 20%.  Pt 20% daily rate will be $3.87/day.  [facility] agreed to provide the service.  [facility] will provide dressing supplies.   Dressing supplies will be delivered to pt home tomorrow, 3/22 or Thursday, 3/23.  Bedside RN will send few days supplies with pt until he receives his delivery.  RNCC visited pt and provided update about coverage info and reviewed discharge planning.  Pt agreed with the plan.    [facility]- TF and TF supplies.  - [phone] or [phone]  Fax- [phone] or [phone]    [facility]- dressing supplies  - [phone]  Fax- [phone]      Carrington Garrido RN, PHN, BSN  4A and 4E/ ICU  Care Coordinator  Phone: [phone]  Pager: [phone]    To contact the weekend RNCC  [facility] (0800 - 1630) Saturday and Sunday   Units: 4A, 4C, 4E, 5A and 5B- Pager [phone]   Units: 6A, 6B- Pager [phone]   Unit : 6C, 6D- pager [phone]   Units: 7A, 7B, 7C, 7D, and 5C- Pager [phone]

## 2023-03-21 NOTE — PROGRESS NOTES
D/I: Patient on unit 4A Surgical/Neuro ICU   Neuro-A&Ox4   CV- NormoT, afebrile.  Pulm- RA  GI- NPO. NGT - TF TID.  - Voiding  Gtts- None  Skin- Flap checks q4h, WDL, LFA Wound vac, RLE  Skin graft site., Neck incision.  Pain- PRN oxycodone given x 1 dose. Scheduled tylenol.  Lines and drains- PIV x2, DAVID drain, Wound vac.  See flow sheets for further interventions and assessments.   A: Stable   P: Continue to monitor flap. Possible discharge today.

## 2023-03-21 NOTE — PLAN OF CARE
Goal Outcome Evaluation:       Discharged to: home at 1500  Belongings:sent home with patient  AVS (After Visit Summary) discussed with: patient and family       VSS. NAD noted at this time. He denies CP,SOB,N/V, HA at this time.

## 2023-03-21 NOTE — CONSULTS
03/21/23 1448 Linda Reed RN     Patient and S.O. seen at bedside for NG tube feeding education. S.O. RD correctly on a model with preparing gravity bag and flushing. Discussed gravity feedings, cleaning bag,  site care, anchoring tube, medication administration and when to call care team. Both very attentive. Asked many relevant questions. Answered all teach back questions appropriately. State they understand all information presented. Literature given: Handwashing and Skin Care, Tube Feeding at Home-Bolus Method Using Syringe and Plunger, Tube Feedings at Home-Berlin Method and NG/NJ Tube Home Care Instructions

## 2023-03-22 ENCOUNTER — TELEPHONE (OUTPATIENT)
Dept: OTOLARYNGOLOGY | Facility: CLINIC | Age: 73
End: 2023-03-22
Payer: COMMERCIAL

## 2023-03-22 NOTE — TELEPHONE ENCOUNTER
Returned call to patient and wife. Patient and wife wondering what he was given the doxazosin for at discharge as well changed from Atenolol to metoprolol. Reviewed with inpatient team who indicated that those 2 meds were switched as they do not have an NG option for Flomax, so switched to doxazosin as well as Atenolol so it was switched to Metoprolol. Patient and wife verbalized understanding.     They also had questions regarding NG tube feedings. All questions answered and advised patient to call with any further questions or concerns.     Reviewed with patient and wife that we will call with pathology results and additional treatment plan once finalized.     Patient is otherwise doing well post op without any additional concerns.     Leonor Husain, RN, BSN

## 2023-03-22 NOTE — TELEPHONE ENCOUNTER
[facility] Call Center    Phone Message    May a detailed message be left on voicemail: yes     Reason for Call: Other: Pts  wife calling in with more questions about pts surgery that was done  on 3/16 . please reach out to Veronika to discuss. thank you      Action Taken: Message routed to:  [facility] ([facility]): ENT     Travel Screening: Not Applicable

## 2023-03-23 NOTE — TUMOR CONFERENCE
Head & Neck Tumor Conference Note   Status: Established (last discussed 3/3/2023)  Staff: Dr. Ballard      Tumor Site: left buccal mucosa  Tumor Pathology: p16 negative SCC   Tumor Stage: pT3N2b  Tumor Treatment:   3/16/2023- WLE left buccal mucosa, left marginal mandibulectomy, left ND 1b-4, reconstruction with left RFFF     Reason for Review: Review imaging, path, and POC     Brief History: This is a 73 year old male with a history of lichen planus for 15 years who presents after a biopsy of that area was positive for invasive SCC. He has been watching the area of lichen planus along the buccal mucosa with his dentist who recently noticed it had increased in size and opted to biopsy it prior to placing the dental implant in. Since the biopsy he has noticed more pain in that area and sensitivity to acidic foods. He has not had any other areas of lichen planus. No neck masses or lesions, weight loss, odynophagia, dysphagia, difficult tongue mobility. He underwent the above stated surgical procedure, here to discuss final pathology.     PMHx: HLD, HTN  PSHx: no large cardiac surgeries, hx of ear surgeries (OCR, Dr. Gibson, ). No issues with bleeding or anesthesia   Social Hx: hx of tobacco use (quit heavy use in 1986, totally in 1995), 1 gin & tonic per week. Retired. Lives with wife.      Pathology:   Surgical pathology 3/16/2023  A. ORAL CAVITY, LEFT MANDIBULAR ALVEOLUS, WIDE LOCAL EXCISION :  - KERATINIZING SQUAMOUS CELL CARCINOMA, MODERATELY DIFFERENTIATED  - Tumor size: 3.0 cm in greatest dimension  DOI: 7.0 mm  - No definitive lymphovascular invasion is identified  - Perineural invasion is not identified  - Medial mucosal resection margin is involved by carcinoma  - Anterior, posterior, lateral and deep margins are negative (Closest deep margin < 1mm)  -AJCC pathologic stage: pT3, N2b    B. MANDIBLE, MARGINAL MANDIBULECTOMY:  - Bone tissue, negative for malignancy    C. ORAL CAVITY, MEDIAL RERESECTION:  -  Negative for carcinoma    D. ORAL CAVITY, MEDIAL MARGIN #2, EXCISION:  - Negative for carcinoma    E. LYMPH NODE, LEFT 1B, EXCISION:  - METASTATIC SQUAMOUS CELL CARCINOMA WITH CYSTIC CHANGES TO FOUR OUT OF SIX LYMPH NODES (4/6)  - Largest tumor deposit size: 0.9 cm  - No extranodal extension is identified  - Benign salivary gland tissue    F. LYMPH NODE, LEFT 2A, EXCISION:  - Six lymph nodes, negative for carcinoma (0/6)    G. LYMPH NODE, LEFT 2B, EXCISION:  - Seven lymph nodes, negative for carcinoma (0/7)    H. LYMPH NODE, LEFT 3, EXCISION:  - Nine lymph nodes, negative for carcinoma (0/9)    I. LYMPH NODE, LEFT 4, EXCISION:  - Fourteen lymph nodes, negative for carcinoma (0/14)    J. LYMPH NODE, PERIFACIAL, EXCISION:  - Benign fragments of salivary gland tissue  - No lymph node tissue present    Tumor Board Recommendation:   Final surgical pathology demonstrated a 3.0cm primary tumor with a 7.0mm DOI. On the primary specimen the medial margin was positive and re-resection margin was negative. The deep margin was the closest, at <1mm. The mandible specimen was negative for tumor involvement. There were four lymph nodes positive in left 1b, none with extranodal extension. Given this pathology the decision to proceed with adjuvant radiation versus chemoradiation is surgeon preference. If he is comfortable with the close deep margin and initial positive medial margin then could proceed with adjuvant radiation alone.     - Review pathology with Dr. Ballard to determine adjuvant therapy plan    Winifred Mcbride MD  Otolaryngology Head and Neck Surgery Resident, PGY-3    Documentation / Disclaimer Cancer Tumor Board Note: Cancer tumor board recommendations do not override what is determined to be reasonable care and treatment, which is dependent on the circumstances of a patient's case; the patient's medical, social, and personal concerns; and the clinical judgment of the oncologist [physician].

## 2023-03-24 ENCOUNTER — TUMOR CONFERENCE (OUTPATIENT)
Dept: ONCOLOGY | Facility: CLINIC | Age: 73
End: 2023-03-24
Payer: COMMERCIAL

## 2023-03-24 DIAGNOSIS — C06.0 SQUAMOUS CELL CARCINOMA OF ORAL MUCOSA (H): Primary | ICD-10-CM

## 2023-03-24 NOTE — OP NOTE
SURGEON:  Vivi Clement MD   ASSISTANT SURGEON: Inocencia Burks MD; Chelsea Jara MD       TITLE OF OPERATION:                      Left ulnar free flap for buccal and alveolar reconstruction   Right thigh split thickness skin graft harvest   Placement of nasogastric feeding tube      INDICATIONS:    This patient is  73 year old  with a history of lichen planus along the buccal mucosa for 15 years who was found to have SCC along that site after a biopsy before dental implanation. He was scheduled for wide local excision of left buccal mucosa, left marginal mandibulectomy, selective neck dissection with Dr. Ballard and required reconstruction of the defect. Given patient's negative Milan's test on both forearms, ulnar free flap was chosen to reconstruct the defect. Risks and benefits were discussed with the patient who agreed to proceed.    Findings:  5x6 cm defect along left buccal mucosa involving alveolus to floor of mouth  Vessels used for microvascular anastomosis: left facial artery, left facial v branches x 2 (1.5 mm couplers to each vein anastomosis)     PREOPERATIVE DIAGNOSES:   Squamous cell carcinoma of oral cavity      POSTOPERATIVE DIAGNOSES:     Same as preop       ANESTHESIA:    General endotracheal anesthesia      IMPLANT DEVICES:   Implant Name Type Inv. Item Serial No.  Lot No. LRB No. Used Action   IMP PROBE DOPPLER FLOW STD CUFF [id number] - [id number] Other IMP PROBE DOPPLER FLOW STD CUFF [id number]  [facility] [employer] [id number] Left 1 Implanted   IMP DEVICE ANASTOMOTIC 1.5MM  BLUE [id number] - [id number] Other IMP DEVICE ANASTOMOTIC 1.5MM  BLUE [id number]  [employer] [id number] Left 1 Implanted   IMP DEVICE ANASTOMOTIC 1.5MM  BLUE [id number] - [id number] Other IMP DEVICE ANASTOMOTIC 1.5MM  BLUE [id number]  [employer] [id number] Left 1 Implanted             SPECIMENS:    ID Type Source Tests Collected by Time Destination   1 : Wide Local Excision Left  Mandibular alveolus Tissue Other SURGICAL PATHOLOGY EXAM Nirmal Ballard MD 3/16/2023  9:26 AM    2 : Marginal Mandibulectomy Tissue Other SURGICAL PATHOLOGY EXAM Nirmal Ballard MD 3/16/2023 10:04 AM    3 : Medial Reresection Tissue Other SURGICAL PATHOLOGY EXAM Nirmal Ballard MD 3/16/2023 10:09 AM    4 : Medial Margin #2 Tissue Other SURGICAL PATHOLOGY EXAM Nirmal Ballard MD 3/16/2023 10:10 AM    5 : Left 1B Tissue Other SURGICAL PATHOLOGY EXAM Nirmal Ballard MD 3/16/2023 10:36 AM    6 : Left 2A Tissue Other SURGICAL PATHOLOGY EXAM Nirmal Ballard MD 3/16/2023 11:10 AM    7 : Left 2B Tissue Other SURGICAL PATHOLOGY EXAM Nirmal Ballard MD 3/16/2023 11:11 AM    8 : Left 3 Tissue Other SURGICAL PATHOLOGY EXAM Nirmal Ballard MD 3/16/2023 11:11 AM    9 : Left 4 Tissue Other SURGICAL PATHOLOGY EXAM Nirmal Ballard MD 3/16/2023 11:15 AM    10 : perifacial lymphnode Tissue Other SURGICAL PATHOLOGY EXAM Nirmal Ballard MD 3/16/2023  3:53 PM             COMPLICATIONS:  None.       BLOOD LOSS: 100 ml          DESCRIPTION OF PROCEDURE:    The patient had been previously prepped and draped for a left forearm free flap. The course of the ulnar artery was identified using a doppler and a fusiform flap was designed centered along the course of the artery with the skin paddle measuring 6 cm x 6 cm in the greatest dimensions.  A superior limb extending up toward the antecubital fossa was also marked. The arm was exsanguinated and the tourniquet was inflated to 250 mm Hg. A 15 blade was used to make an incision extending from the antecubital fossa down to the superior edge of the flap and circumferentially around the planned skin paddle. Subdermal flap from the radial aspect of the incision was raised. A large cutaneous  was identified. this was followed proximally and a superficial ulnar artery and vena comitantes were identified superficial to the flexor digitorum  superficialis and flexor carpi ulnaris. The ulnar nerve was identified in a subfascial plane between FDS and FCU as anticipated. Subdermal flap was then raised from the ulnar aspect of the skin paddle, taking care to preserve the main . The distal aspect of the vascular pedicle was ligated with 2-0 silk and surgical clips. We then proceeded to dissect the pedicle proximally towards branching point, ligating any branches not going to the skin flap with bipolar cautery and/or surgical clips. Care was taken during this entire dissection to visualize and protect the ulnar nerve. Dissection of the pedicle continued proximally until we felt we had enough pedicle length for microvascular anastomosis. This occurred distal to the interosseous artery. The tourniquet was deflated. The flap was allowed to reperfuse. Total tourniquet time was 75 minutes. At this point the artery and vena comitantes were  and ligated separately with surgical clips.     Attention was turned to closure of the left ulnar forearm donor site. A DAVID drain was placed and secured with a silk suture. The proximal incision was closed in 2 layers with a deep 3-0 vicryl followed by a dermabond prineo. A split thickness skin graft was harvested from the right thigh with a dermatome and sutured to the forearm donor site with 4-0 chromic in interrupted and running fashion. The skin graft was pie crusted and tacked in place. A dressing was placed along with a wound vac. The arm was wrapped with kerlix and ace, and placed in a splint.     The ulnar forearm flap was brought to the head of the bed and placed within the defect. The apex of the flap was tacked posterior aspect of the buccal defect near the retromolar trigone and secured with a 3-0 vicryl in a horizontal mattress. The flap was then contoured to the buccal mucosa and draped over the exposed mandible and down into the floor of mouth defect. The posterior and lingual aspects of the  defect were inset from posterior to anterior with 3-0 vicryl horizontal mattress sutures.     Attention was then turned to the anastamosis of the vessels. The vessels were then tunnedl into the left neck through the floor of mouth defect using a 1inch penrose. Pedicle geometry was assessed the ensure gentle curvature to the vessels. The left facial artery and two branches of common facial vein were inspected and deemed appropriate for use. The operative microscope was brought into the field. The donor artery and veins were cleaned circumferentially. The facial artery had previously been ligated and the vascular clamp was removed to test for adequate flow. The two vein branches were ligated and vascular clamps were placed across the bases. Vessels were dilated using a vessel dilator and irrigated with heparinized normal saline. The ulnar artery and vena comitantes were also cleaned circumferentially. Once appropriate vessel geometry was confirmed, we began with the arterial anastomosis. The facial artery and ulnar artery were brought into opposition and circumferential interrupted 9-0 nylon was used for anastomosis. We then dilated and sized the veins using a venous coupling sizer. The coupling device was used to couple both venous anastomoses with 1.5 mm couplers.   The vascular clamp was released off the artery with good flow noted. There was a leak noted from around the anastamosis. A single stitch was placed to help close the leak.The vein clamp was then released after the veins were noted to fill with blood. Implantable dopplers were placed around the artery and secured with micro clips. Strong arterial doppler signal was obtained.     We then turned our attention intraorally to complete the flap inset. The anterior and buccal edges were inset in the same fashion with 3-0 vicryl horizontal mattress sutures. At the anterior apex where the flap was sutured to gingiva, a circumdental suture was placed to ensure  watertight closure. The incision lines were checked circumferentially to ensure no fistula formation between the oral cavity and neck. The mouth was irrigated with normal saline.     Two 10 mm fully perforated DAVID drains were placed in the neck and secured with a 3-0 nylon. The neck was closed with a buried interrupted 3-0 vicryl followed by a 4-0 nylon running. 12 [place] nasogastric tube placement, sutured to the nare with silk. Bacitracin was applied to the incisions. A doppler signal was identified on the skin paddle of the flap and marked with a 4-0 prolene suture. The patient was then handed over to the anesthesia team for extubation and emergence.     The patient tolerated the procedure well.  There were no complications. The patient was then taken to the ICU in stable condition, following commands and breathing spontaneously.       Luther Lugo MD was present and scrubbed for the entire procedure.           LUTHER LUGO MD

## 2023-03-29 ENCOUNTER — THERAPY VISIT (OUTPATIENT)
Dept: SPEECH THERAPY | Facility: CLINIC | Age: 73
End: 2023-03-29
Payer: COMMERCIAL

## 2023-03-29 ENCOUNTER — ALLIED HEALTH/NURSE VISIT (OUTPATIENT)
Dept: OTOLARYNGOLOGY | Facility: CLINIC | Age: 73
End: 2023-03-29
Payer: COMMERCIAL

## 2023-03-29 DIAGNOSIS — Z98.890 POSTOPERATIVE STATE: Primary | ICD-10-CM

## 2023-03-29 DIAGNOSIS — R13.12 OROPHARYNGEAL DYSPHAGIA: ICD-10-CM

## 2023-03-29 DIAGNOSIS — C06.9 PRIMARY SQUAMOUS CELL CARCINOMA OF ORAL CAVITY (H): Primary | ICD-10-CM

## 2023-03-29 LAB
PATH REPORT.COMMENTS IMP SPEC: ABNORMAL
PATH REPORT.COMMENTS IMP SPEC: ABNORMAL
PATH REPORT.COMMENTS IMP SPEC: YES
PATH REPORT.FINAL DX SPEC: ABNORMAL
PATH REPORT.GROSS SPEC: ABNORMAL
PATH REPORT.INTRAOP OBS SPEC DOC: ABNORMAL
PATH REPORT.MICROSCOPIC SPEC OTHER STN: ABNORMAL
PATH REPORT.RELEVANT HX SPEC: ABNORMAL
PATHOLOGY SYNOPTIC REPORT: ABNORMAL
PHOTO IMAGE: ABNORMAL

## 2023-03-29 PROCEDURE — 99207 PR NO CHARGE NURSE ONLY: CPT

## 2023-03-29 PROCEDURE — 92610 EVALUATE SWALLOWING FUNCTION: CPT | Mod: GN | Performed by: SPEECH-LANGUAGE PATHOLOGIST

## 2023-03-30 ENCOUNTER — TELEPHONE (OUTPATIENT)
Dept: OTOLARYNGOLOGY | Facility: CLINIC | Age: 73
End: 2023-03-30
Payer: COMMERCIAL

## 2023-03-30 ENCOUNTER — MYC REFILL (OUTPATIENT)
Dept: OTOLARYNGOLOGY | Facility: CLINIC | Age: 73
End: 2023-03-30
Payer: COMMERCIAL

## 2023-03-30 ENCOUNTER — PATIENT OUTREACH (OUTPATIENT)
Dept: ONCOLOGY | Facility: CLINIC | Age: 73
End: 2023-03-30
Payer: COMMERCIAL

## 2023-03-30 DIAGNOSIS — C06.0 SQUAMOUS CELL CARCINOMA OF ORAL MUCOSA (H): ICD-10-CM

## 2023-03-30 RX ORDER — CHLORHEXIDINE GLUCONATE ORAL RINSE 1.2 MG/ML
15 SOLUTION DENTAL EVERY 8 HOURS
Qty: 473 ML | Refills: 0 | Status: SHIPPED | OUTPATIENT
Start: 2023-03-30 | End: 2023-03-30

## 2023-03-30 NOTE — TELEPHONE ENCOUNTER
Returned call to patient and wife to discuss their questions. All questions answered and they were encouraged to call with any further questions or concerns.     Patient and wife will arrange radiation clearance with local dentist. They have dental information sheet and they will call with any additional needs.     Patient requested refill on peridex, which was sent to the pharmacy.     Leonor Husain RN, BSN

## 2023-03-30 NOTE — TELEPHONE ENCOUNTER
Imaging Received  2023 8:25 AM ABT   Action: Images from  received and resolved to PACS.     Action 2023 3:47 PM ABT   Action Taken Called and spoke with patient, confirms no rad or med onc records.     MEDICAL RECORDS REQUEST   Radiation Oncology  [address] [place] [address]  Fax: [phone]          FUTURE VISIT INFORMATION                                                   Jenaro Kerr, : 1950 scheduled for future visit at [facility] Radiation Oncology    RECORDS REQUESTED FOR VISIT                                                     HEAD & NECK     OFFICE NOTE from PCP [facility] 23: Dr. Evelio Aparicio   OFFICE NOTE from ENT [facility] Consult:  23: Dr. Nirmal Ballard     Consult:  21: Dr. Umair Gibson     F/U:  22: Dr. Umair Gibson   OPERATIVE/BIOPSY REPORTS [facility] 23: Wide local excision of buccal mucosa   MEDICATION LIST [facility]    LABS     PATHOLOGY REPORTS Reports in Epic 23: [id number]  23: [id number]   ANYTHING RELATED TO DIAGNOSIS Epic Most recent 23   IMAGING (NEED IMAGES & REPORT)     CT SCANS PACS HP:  18: CT Head   MRI PACS HP:  21, 17: MR Brain   PET PACS 23: PET Onc Eyes to Thighs

## 2023-03-30 NOTE — TELEPHONE ENCOUNTER
[facility] Call Center    Phone Message    May a detailed message be left on voicemail: yes     Reason for Call: Other: Pt and wife have follow up questions from yesterday's appt for Lona. Please call pt or wife back to discuss. Thank you     Action Taken: Message routed to:  [facility] ENT    Travel Screening: Not Applicable

## 2023-03-30 NOTE — TUMOR CONFERENCE
Spoke with patient and wife regarding the following pathology results:    Final Diagnosis   A. ORAL CAVITY, LEFT MANDIBULAR ALVEOLUS, WIDE LOCAL EXCISION :  - KERATINIZING SQUAMOUS CELL CARCINOMA, MODERATELY DIFFERENTIATED  - Tumor size: 3.0 cm in greatest dimension  DOI: 7.0 mm  - No definitive lymphovascular invasion is identified  - Perineural invasion is not identified  - Medial mucosal resection margin is involved by carcinoma  - Anterior, posterior, lateral and deep margins are negative (Closest deep margin < 1mm)  -AJCC pathologic stage: pT2, N2b    B. MANDIBLE, MARGINAL MANDIBULECTOMY:  - Bone tissue, negative for malignancy    C. ORAL CAVITY, MEDIAL RERESECTION:  - Negative for carcinoma    D. ORAL CAVITY, MEDIAL MARGIN #2, EXCISION:  - Negative for carcinoma    E. LYMPH NODE, LEFT 1B, EXCISION:  - METASTATIC SQUAMOUS CELL CARCINOMA WITH CYSTIC CHANGES TO FOUR OUT OF SIX LYMPH NODES (4/6)  - Largest tumor deposit size: 0.9 cm  - No extranodal extension is identified  - Benign salivary gland tissue    F. LYMPH NODE, LEFT 2A, EXCISION:  - Six lymph nodes, negative for carcinoma (0/6)    G. LYMPH NODE, LEFT 2B, EXCISION:  - Seven lymph nodes, negative for carcinoma (0/7)    H. LYMPH NODE, LEFT 3, EXCISION:  - Nine lymph nodes, negative for carcinoma (0/9)    I. LYMPH NODE, LEFT 4, EXCISION:  - Fourteen lymph nodes, negative for carcinoma (0/14)    J. LYMPH NODE, PERIFACIAL, EXCISION:  - Benign fragments of salivary gland tissue  - No lymph node tissue present     Reviewed with patient that Dr. Ballard and tumor board recommend proceeding with post op radiation. Patient and wife in agreement. Reviewed typical course of radiation, potential need for PEG tube placement and the need for dental clearance prior to radiation. Patient wishes to hold on PEG tube at this time and would like to communicate with radiation oncologist prior to proceeding with that If needed. He will work with his local dentist to arrange  clearance. Reviewed that we would want radiation to start within 6 weeks of surgery. He was given radiation handout and will call with further questions.     He will await call to arrange consult with radiation at [facility] given his location and proximity to clinic.     Leonor Husain, RN, BSN

## 2023-03-30 NOTE — PROGRESS NOTES
I called and spoke to Wilbert.  I explained my role and purpose of my call.  We discussed an appt tomorrow and he is ok with it.  He does still need to see the dentist for clearance and have the mouth trays made, but I said he could still consult to get things going.  Transferred him to NPS (new patient scheduling) team to finalize scheduling...      New Patient Oncology Nurse Navigator Note     Referring provider: Dr. Phelan     Referring Clinic/Organization: [facility] E[facility]     [facility]    Requested provider (if applicable): MARTITA PEREZ location     Date Referral Received: 3/30/2023     Evaluation for :  SCC of oral mucosa   Tumor Pathology: p16 negative SCC   Tumor Stage: pT3N2b    Clinical History (per Nurse review of records provided):    **BOOK MARKED**   NOTES:  3/29/2023:  SLP visit  3/24/2023:  ENT tumor conference  3/16/2023:  Procedure Note  2/22/2023:  Dr. Phelan consult    IMAGING:  3/1/2023:  PET CT    PATHOLOGY:  3/16/2023:  Final Diagnosis   A. ORAL CAVITY, LEFT MANDIBULAR ALVEOLUS, WIDE LOCAL EXCISION :  - KERATINIZING SQUAMOUS CELL CARCINOMA, MODERATELY DIFFERENTIATED  - Tumor size: 3.0 cm in greatest dimension  DOI: 7.0 mm  - No definitive lymphovascular invasion is identified  - Perineural invasion is not identified  - Medial mucosal resection margin is involved by carcinoma  - Anterior, posterior, lateral and deep margins are negative (Closest deep margin < 1mm)  -AJCC pathologic stage: pT2, N2b    B. MANDIBLE, MARGINAL MANDIBULECTOMY:  - Bone tissue, negative for malignancy    C. ORAL CAVITY, MEDIAL RERESECTION:  - Negative for carcinoma    D. ORAL CAVITY, MEDIAL MARGIN #2, EXCISION:  - Negative for carcinoma    E. LYMPH NODE, LEFT 1B, EXCISION:  - METASTATIC SQUAMOUS CELL CARCINOMA WITH CYSTIC CHANGES TO FOUR OUT OF SIX LYMPH NODES (4/6)  - Largest tumor deposit size: 0.9 cm  - No extranodal extension is identified  - Benign salivary gland tissue    F.  LYMPH NODE, LEFT 2A, EXCISION:  - Six lymph nodes, negative for carcinoma (0/6)    G. LYMPH NODE, LEFT 2B, EXCISION:  - Seven lymph nodes, negative for carcinoma (0/7)    H. LYMPH NODE, LEFT 3, EXCISION:  - Nine lymph nodes, negative for carcinoma (0/9)    I. LYMPH NODE, LEFT 4, EXCISION:  - Fourteen lymph nodes, negative for carcinoma (0/14)    J. LYMPH NODE, PERIFACIAL, EXCISION:  - Benign fragments of salivary gland tissue  - No lymph node tissue present      Amendment electronically signed by Maggie Aguiar MD on 3/29/2023 at  1:37 PM   Electronically signed by Maggie Aguiar MD on 3/22/2023 at  1:42 PM         Clinical Assessment / Barriers to Care (Per Nurse): none noted       Records Location (Care Everywhere, Media, etc.): EPIC     Records Needed: none     Additional testing needed prior to consult: none

## 2023-03-31 ENCOUNTER — OFFICE VISIT (OUTPATIENT)
Dept: RADIATION ONCOLOGY | Facility: CLINIC | Age: 73
End: 2023-03-31
Attending: OTOLARYNGOLOGY
Payer: COMMERCIAL

## 2023-03-31 ENCOUNTER — APPOINTMENT (OUTPATIENT)
Dept: RADIATION ONCOLOGY | Facility: CLINIC | Age: 73
End: 2023-03-31
Payer: COMMERCIAL

## 2023-03-31 ENCOUNTER — PRE VISIT (OUTPATIENT)
Dept: RADIATION ONCOLOGY | Facility: CLINIC | Age: 73
End: 2023-03-31

## 2023-03-31 VITALS
BODY MASS INDEX: 25.38 KG/M2 | WEIGHT: 176.9 LBS | HEART RATE: 68 BPM | DIASTOLIC BLOOD PRESSURE: 67 MMHG | OXYGEN SATURATION: 97 % | SYSTOLIC BLOOD PRESSURE: 102 MMHG | TEMPERATURE: 98.3 F | RESPIRATION RATE: 18 BRPM

## 2023-03-31 DIAGNOSIS — C06.0 SQUAMOUS CELL CARCINOMA OF ORAL MUCOSA (H): ICD-10-CM

## 2023-03-31 PROCEDURE — 77263 THER RADIOLOGY TX PLNG CPLX: CPT | Performed by: SURGERY

## 2023-03-31 PROCEDURE — 77334 RADIATION TREATMENT AID(S): CPT | Performed by: SURGERY

## 2023-03-31 PROCEDURE — 77333 RADIATION TREATMENT AID(S): CPT | Mod: 59 | Performed by: SURGERY

## 2023-03-31 PROCEDURE — 99205 OFFICE O/P NEW HI 60 MIN: CPT | Mod: 25 | Performed by: SURGERY

## 2023-03-31 ASSESSMENT — PAIN SCALES - GENERAL: PAINLEVEL: MILD PAIN (3)

## 2023-03-31 NOTE — PATIENT INSTRUCTIONS
What to expect at your Simulation visit:    You will meet with a Radiation Therapist and other team members who will be doing a planning session called a  simulation  with you. This process will determine your daily treatment.    ~ You will lie on a flat table and have a treatment planning CT scan.  It is important during the scan to hold very still and breathe normally.    ~ Your therapist may construct a body mold to help you hold still for your treatments.    ~ If you are having treatment to the head or neck area you will be fitted with a plastic mesh mask that fits very snugly over your face and neck.     ~ Your therapist will be taking some digital photos that will go in your treatment chart.      ~Your therapist will make marks on your skin and take measurements. Your therapist may ask you about making small tattoos (a permanent small dot) over these marks.  These marks are used to position you daily for your radiation therapy treatments. Please do not wash off any marks until all of your radiation therapy treatments are complete unless you are instructed to do so by your therapist.    ~ Once the simulation is completed it can take from 3 to 10 business days before you start radiation therapy treatments.    ~ You may meet with a nurse who will go over management of treatment side effects and self care during your treatments. The nurse will help to plan care with other departments and physicians if needed.       Please contact [facility] RN with questions or concerns following today's appointment: [phone].    Thank you!

## 2023-03-31 NOTE — PROGRESS NOTES
Department of Radiation Oncology  Formerly [facility]: Cancer Center  [facility]  [address] [place] [id number]  [phone]       Consultation Note    Name: Jenaro Kerr MRN: [id number]   : 1950   Date of Service: Mar 31, 2023 Referring: Dr. Kitchen     Reason for consultation: post-op oral cavity (buccal/gingivobuccal sulcus ) squamous cell carcinoma    History of Present Illness     Mr. 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     Briefly, his oncologic history is as follows:    Patient has a history of lichen planus for 15 years of the left buccal mucosa that has been watched for this by his local dentist.  Recently, this is increased in size prompting further evaluation.    He underwent biopsy on 2023 with pathology returning as squamous cell carcinoma.    He was evaluated by Dr. Nirmal Kitchen on 2023, with exam demonstrating a firm irregular tissue in the left gingivobuccal sulcus extending medially along the mandible and slightly into the buccal mucosa without any obvious mandible invasion.    PET CT scan was obtained on 3/1/2023 which demonstrated a 1.5 cm left buccal mucosal lesion with an SUV of 16, with 3 left level 1B lymph nodes the largest measuring 1.0 cm in size with a max SUV uptake of 17, without any evidence of contralateral adenopathy, without evidence of distant metastatic disease.    He underwent wide local excision and left marginal mandibulectomy, left neck dissection with left forearm free flap reconstruction on 3/16/23.  Pathology returned as 3.0 cm, 7 mm depth invasion, no LVSI, no PNI, negative margins (close 1 mm deep margin), 4/42 lymph nodes, all of which were positive in level 1B with the largest measuring  9 mm in size without evidence of extranodal extension.  There is no evidence of bony invasion, final pathologic stage was a pT2N2b.    His case was discussed at head neck tumor board on 3/24/2023 with recommendation for adjuvant radiation.  There was some question of the close margin and possibility for chemoradiation, but this was eventually decided against after discussion with Dr. Kitchen and myself, given negative margin status and no evidence of extranodal extension.     Overall, patient complains of a 3 out of 10 pain.  He does endorse a neuralgia and numbness of the left lip tongue.  He does endorse fatigue.  He has had weight loss since surgery as he is adapting to a advance diet and currently is on more of a softer/liquid diet.  He did have 3 intraoral teeth removed during surgery but is pending evaluation on 4/6/2023.Today, he denies any sore throat,  globus, otalgia, neck lymphadenopathy, dysphagia, odynophagia, aspiration symptoms, chest pain, hemoptysis. Denies headaches or focal neurologic deficits. Denies fevers, drainage.     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       Past Surgical History:   Past Surgical History:   Procedure Laterality Date     DISSECTION RADICAL NECK MODIFIED Left 3/16/2023    Procedure: left modified radical neck dissection;  Surgeon: Nirmal Ballard MD;  Location: [facility] OR     EXCISE LESION INTRAORAL Left 3/16/2023    Procedure: wide local excision of buccal mucosa;  Surgeon: Nirmal Ballard MD;  Location: [facility] OR     EYE SURGERY  2019     GRAFT FREE VASCULARIZED (LOCATION) Left 3/16/2023    Procedure: left forearm free flap;  Surgeon: Vivi Clement MD;  Location: [facility] OR     GRAFT SKIN SPLIT THICKNESS FROM EXTREMITY Right 3/16/2023    Procedure: split thickness skin graft from right thigh;  Surgeon: Vivi Clement MD;  Location: [facility] OR     HERNIA REPAIR        LEG SURGERY Right     fracture     MANDIBULECTOMY TOTAL Left 3/16/2023    Procedure: left marginal mandibulectomy;  Surgeon: Nirmal Ballard MD;  Location: [facility] OR     NC ANESTH,EAR SURGERY       TRACHEOSTOMY N/A 3/16/2023    Procedure: nasogastric tube placement;  Surgeon: Nirmal Ballard MD;  Location: [facility] OR       Chemotherapy History:  No prior chemotherapy    Radiation History:  No prior RT    Pregnant: No  Implanted Cardiac Devices: No    Medications:  Current Outpatient Medications   Medication     acetaminophen (TYLENOL) 325 MG tablet     atorvastatin (LIPITOR) 80 MG tablet     chlorhexidine (PERIDEX) 0.12 % solution     cycloSPORINE (RESTASIS) 0.05 % ophthalmic emulsion     doxazosin (CARDURA) 1 MG tablet     lisinopril (ZESTRIL) 30 MG tablet     metoprolol tartrate (LOPRESSOR) 25 MG tablet     mineral oil-hydrophilic petrolatum (AQUAPHOR) external ointment     moxifloxacin (VIGAMOX) 0.5 % ophthalmic solution     Multiple Vitamins-Minerals (EYE VITAMINS PO)     sennosides (SENOKOT) 8.6 MG tablet     No current facility-administered medications for this visit.         Allergies:     Allergies   Allergen Reactions     Erythromycin GI Disturbance     Other reaction(s): Gastrointestinal       Social History:  Tobacco: 1 ppd for 25 years, but quit 20 years ago  Alcohol: occasional EtOH  Employment: retired     Family History:  Family History   Problem Relation Age of Onset     Anesthesia Reaction No family hx of      Deep Vein Thrombosis (DVT) No family hx of      Cardiovascular No family hx of        Review of Systems   A 10-point review of systems was performed. Pertinent findings are noted in the HPI.    Physical Exam   ECOG Status: 1    Vitals:  BP (!) 89/61 (BP Location: Right arm, Patient Position: Chair, Cuff Size: Adult Regular)   Pulse 74   Temp 98.3  F (36.8  C) (Oral)   Resp 18   Wt 80.2 kg (176 lb 14.4 oz)   SpO2 97%   BMI 25.38 kg/m      Gen: Alert, in NAD  Head: NC/AT  Eyes: PERRL, EOMI,  sclera anicteric  Ears: No external auricular lesions  Nose/sinus: No rhinorrhea or epistaxis  Oral cavity/oropharynx: MMM, no visible oral cavity lesions, left gingivobuccal sulcus with radial forearm reconstruction, well healed, incision is CDI, neck incision is CDI   Neck: Full ROM, supple, no palpable adenopathy  Pulm: No wheezing, stridor or respiratory distress  CV: Extremities are warm and well-perfused, no cyanosis, no pedal edema  Abdominal: Normal bowel sounds, soft, nontender, no masses  Musculoskeletal: Normal bulk and tone  Skin: Normal color and turgor  Neuro: A/Ox3, CN II-XII intact, normal gait    Imaging/Path/Labs   Imaging: per HPI, personally reviewed and in agreement    Path: per HPI, personally reviewed and in agreement    Labs: per HPI, personally reviewed and in agreement    Assessment      Mr. 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 Kitchen) with pathology revealing 3.0cm SCC, 7mm DOI, no LVSI, no PNI, negative margins (close <1mm margin deep), no bony invasion.     We discussed the management of post-operative head and neck cancer. Per NCCN guidelines, patients who have underwent surgical resection of primary and neck dissection, adjuvant treatment depends on the presence of adverse features (pT3/T4, pN2/N3, extra skylar extension, positive/close margin, perineural invasion, or lymph vascular space invasion).     In patients without adverse features, one may consider observation or adjuvant radiation therapy (Jina, MD Oliveira, Int Jour Rad Onc Bio Phys, 2001).  In patients with the presence of adverse features, adjuvant therapy is a category 1 recommendation, with recommendation for systemic therapy and adjuvant radiation in patients with extranodal extension and/or a positive margin, and adjuvant radiation with possible consideration of systemic therapy in patients with adverse risk  features without positive margin or extranodal extension (Jina, MD Oliveira, Int Jour Rad Onc Bio Phys, 2001; Beverly et al, Pooled Analysis EORTC and RTOG, Head and Neck, 2005).      We also discussed that optimal timing of initiating post-operative head and neck radiotherapy is within 4-6 of surgery (Jina, Int J Radiation Oncol Bio Physis, 2001).     Plan     1. After extensive discussion, patient wishes to pursue adjuvant RT without chemotherapy, given negative margin and no evidence of extranodal extension. Plan for 66Gy/33 fx to post op bed and 54Gy to ipsilateral elective skylar volumes.  2. CT simulation was performed today, with plan to start 4/20/23  3. Medical Oncology- not required, no plan for chemotherapy   4. Dental- had extractions in OR, but pending Radiation Clearance, scheduled on 4/6/23  5. Feeding tube- No plan for feeding tube. However, will place nutrition referral.  6. Updated imaging- No updated imaging required     All benefits and risks discussed, and patient is in agreement with the oncologic plan discussed above.     Thank you for allowing me to participate in your patient's care.  If you should require any additional information, please do not hesitate in contacting me.     Jimmy Luther MD  Department of Radiation Oncology  [facility]

## 2023-03-31 NOTE — LETTER
3/31/2023         RE: Jenaro Kerr  [address]  [facility] [address]        Dear Colleague,    Thank you for referring your patient, Jenaro Kerr, to the [facility] RADIATION ONCOLOG[facility] [place]. Please see a copy of my visit note below.       Department of Radiation Oncology  [facility]: Cancer Center  [facility]  [address] [place] [id number]  [phone]       Consultation Note    Name: Jenaro Kerr MRN: [id number]   : 1950   Date of Service: Mar 31, 2023 Referring: Dr. Kitchen     Reason for consultation: post-op oral cavity (buccal/gingivobuccal sulcus ) squamous cell carcinoma    History of Present Illness     Mr. 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     Briefly, his oncologic history is as follows:    Patient has a history of lichen planus for 15 years of the left buccal mucosa that has been watched for this by his local dentist.  Recently, this is increased in size prompting further evaluation.    He underwent biopsy on 2023 with pathology returning as squamous cell carcinoma.    He was evaluated by Dr. Nirmal Kitchen on 2023, with exam demonstrating a firm irregular tissue in the left gingivobuccal sulcus extending medially along the mandible and slightly into the buccal mucosa without any obvious mandible invasion.    PET CT scan was obtained on 3/1/2023 which demonstrated a 1.5 cm left buccal mucosal lesion with an SUV of 16, with 3 left level 1B lymph nodes the largest measuring 1.0 cm in size with a max SUV uptake of 17, without any evidence of contralateral adenopathy, without evidence of distant metastatic disease.    He underwent wide local excision and left marginal mandibulectomy, left neck  dissection with left forearm free flap reconstruction on 3/16/23.  Pathology returned as 3.0 cm, 7 mm depth invasion, no LVSI, no PNI, negative margins (close 1 mm deep margin), 4/42 lymph nodes, all of which were positive in level 1B with the largest measuring 9 mm in size without evidence of extranodal extension.  There is no evidence of bony invasion, final pathologic stage was a pT2N2b.    His case was discussed at head neck tumor board on 3/24/2023 with recommendation for adjuvant radiation.  There was some question of the close margin and possibility for chemoradiation, but this was eventually decided against after discussion with Dr. Kitchen and myself, given negative margin status and no evidence of extranodal extension.     Overall, patient complains of a 3 out of 10 pain.  He does endorse a neuralgia and numbness of the left lip tongue.  He does endorse fatigue.  He has had weight loss since surgery as he is adapting to a advance diet and currently is on more of a softer/liquid diet.  He did have 3 intraoral teeth removed during surgery but is pending evaluation on 4/6/2023.Today, he denies any sore throat,  globus, otalgia, neck lymphadenopathy, dysphagia, odynophagia, aspiration symptoms, chest pain, hemoptysis. Denies headaches or focal neurologic deficits. Denies fevers, drainage.     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       Past Surgical History:   Past Surgical History:   Procedure Laterality Date     DISSECTION RADICAL NECK MODIFIED Left 3/16/2023    Procedure: left modified radical neck dissection;  Surgeon: Nirmal Ballard MD;  Location: [facility]     EXCISE LESION INTRAORAL Left 3/16/2023    Procedure: wide local excision of buccal mucosa;  Surgeon: Nirmal Ballard MD;  Location: [facility]     EYE SURGERY  2019     GRAFT FREE VASCULARIZED (LOCATION) Left 3/16/2023    Procedure: left  forearm free flap;  Surgeon: Vivi Clement MD;  Location: [facility]     GRAFT SKIN SPLIT THICKNESS FROM EXTREMITY Right 3/16/2023    Procedure: split thickness skin graft from right thigh;  Surgeon: Vivi Clement MD;  Location: [facility]     HERNIA REPAIR       LEG SURGERY Right     fracture     MANDIBULECTOMY TOTAL Left 3/16/2023    Procedure: left marginal mandibulectomy;  Surgeon: Nirmal Ballard MD;  Location: [facility] OR     NC ANESTH,EAR SURGERY       TRACHEOSTOMY N/A 3/16/2023    Procedure: nasogastric tube placement;  Surgeon: Nirmal Ballard MD;  Location: [facility] OR       Chemotherapy History:  No prior chemotherapy    Radiation History:  No prior RT    Pregnant: No  Implanted Cardiac Devices: No    Medications:  Current Outpatient Medications   Medication     acetaminophen (TYLENOL) 325 MG tablet     atorvastatin (LIPITOR) 80 MG tablet     chlorhexidine (PERIDEX) 0.12 % solution     cycloSPORINE (RESTASIS) 0.05 % ophthalmic emulsion     doxazosin (CARDURA) 1 MG tablet     lisinopril (ZESTRIL) 30 MG tablet     metoprolol tartrate (LOPRESSOR) 25 MG tablet     mineral oil-hydrophilic petrolatum (AQUAPHOR) external ointment     moxifloxacin (VIGAMOX) 0.5 % ophthalmic solution     Multiple Vitamins-Minerals (EYE VITAMINS PO)     sennosides (SENOKOT) 8.6 MG tablet     No current facility-administered medications for this visit.         Allergies:     Allergies   Allergen Reactions     Erythromycin GI Disturbance     Other reaction(s): Gastrointestinal       Social History:  Tobacco: 1 ppd for 25 years, but quit 20 years ago  Alcohol: occasional EtOH  Employment: retired     Family History:  Family History   Problem Relation Age of Onset     Anesthesia Reaction No family hx of      Deep Vein Thrombosis (DVT) No family hx of      Cardiovascular No family hx of        Review of Systems   A 10-point review of systems was performed. Pertinent findings are noted in the HPI.    Physical Exam   ECOG Status:  1    Vitals:  BP (!) 89/61 (BP Location: Right arm, Patient Position: Chair, Cuff Size: Adult Regular)   Pulse 74   Temp 98.3  F (36.8  C) (Oral)   Resp 18   Wt 80.2 kg (176 lb 14.4 oz)   SpO2 97%   BMI 25.38 kg/m      Gen: Alert, in NAD  Head: NC/AT  Eyes: PERRL, EOMI, sclera anicteric  Ears: No external auricular lesions  Nose/sinus: No rhinorrhea or epistaxis  Oral cavity/oropharynx: MMM, no visible oral cavity lesions, left gingivobuccal sulcus with radial forearm reconstruction, well healed, incision is CDI, neck incision is CDI   Neck: Full ROM, supple, no palpable adenopathy  Pulm: No wheezing, stridor or respiratory distress  CV: Extremities are warm and well-perfused, no cyanosis, no pedal edema  Abdominal: Normal bowel sounds, soft, nontender, no masses  Musculoskeletal: Normal bulk and tone  Skin: Normal color and turgor  Neuro: A/Ox3, CN II-XII intact, normal gait    Imaging/Path/Labs   Imaging: per HPI, personally reviewed and in agreement    Path: per HPI, personally reviewed and in agreement    Labs: per HPI, personally reviewed and in agreement    Assessment      Mr. 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 Kitchen) with pathology revealing 3.0cm SCC, 7mm DOI, no LVSI, no PNI, negative margins (close <1mm margin deep), no bony invasion.     We discussed the management of post-operative head and neck cancer. Per NCCN guidelines, patients who have underwent surgical resection of primary and neck dissection, adjuvant treatment depends on the presence of adverse features (pT3/T4, pN2/N3, extra skylar extension, positive/close margin, perineural invasion, or lymph vascular space invasion).     In patients without adverse features, one may consider observation or adjuvant radiation therapy (Jina, MD Oliveira, Int Jour Rad Onc Bio Phys, 2001).  In patients with the presence of adverse features,  adjuvant therapy is a category 1 recommendation, with recommendation for systemic therapy and adjuvant radiation in patients with extranodal extension and/or a positive margin, and adjuvant radiation with possible consideration of systemic therapy in patients with adverse risk features without positive margin or extranodal extension (Jina, MD Oliveira, Int Jour Rad Onc Bio Phys, 2001; Beverly et al, Pooled Analysis EORTC and RTOG, Head and Neck, 2005).      We also discussed that optimal timing of initiating post-operative head and neck radiotherapy is within 4-6 of surgery (Jina, Int J Radiation Oncol Bio Physis, 2001).     Plan     1. After extensive discussion, patient wishes to pursue adjuvant RT without chemotherapy, given negative margin and no evidence of extranodal extension. Plan for 66Gy/33 fx to post op bed and 54Gy to ipsilateral elective skylar volumes.  2. CT simulation was performed today, with plan to start 4/20/23  3. Medical Oncology- not required, no plan for chemotherapy   4. Dental- had extractions in OR, but pending Radiation Clearance, scheduled on 4/6/23  5. Feeding tube- No plan for feeding tube. However, will place nutrition referral.  6. Updated imaging- No updated imaging required     All benefits and risks discussed, and patient is in agreement with the oncologic plan discussed above.     Thank you for allowing me to participate in your patient's care.  If you should require any additional information, please do not hesitate in contacting me.     Jimmy Luther MD  Department of Radiation Oncology  [facility]         Again, thank you for allowing me to participate in the care of your patient.        Sincerely,        Jimmy Luther MD

## 2023-03-31 NOTE — NURSING NOTE
"INITIAL PATIENT ASSESSMENT      Diagnosis: head and neck cancer    Prior radiation therapy: None    Prior chemotherapy: None    Prior hormonal therapy: No    Pain Eval:  Denies at current visit, patient reports left throat pain when swallowing \"3/10\", denies need for medication management.    Psychosocial  Living arrangements: lives at home in [place], presents to clinic with wife Veronika.  Fall Risk: independent  MIPS Falls Risk Screening Completed: Yes Result: Negative   referral needs: Not needed    Advanced Directive: Yes - Location: patient reports on file with PCP  Implantable Cardiac Device: No  Authorization To Share Protected Health Information: YES - Date: 3/31/2023    Dental Information:  Patient reports he is scheduled for a radiation clearance exam at [facility] on Thursday, 4/6/2023.  Fax number for [facility] radiation oncology clinic provided to patient and wife to ensure that letter of clearance is faxed to Dr. Luther.    Reproductive note: not recorded for male patient.      Review of Systems     Constitutional: Positive for malaise/fatigue and weight loss. Negative for chills, diaphoresis and fever.        Patient reports fatigues easily over the past four days, patient also reports approximate 10 pound weight loss since surgical procedure.  NG tube removed by clinic, patient reports \"I eat like a bird\".  Cancer center nutrition referral ordered.   HENT: Positive for hearing loss and sore throat. Negative for congestion, ear discharge, ear pain, nosebleeds, sinus pain and tinnitus.         Patient reports hearing loss at baseline, wears bilateral hearing aides.  Patient reports left sided sore throat \"3/10\" when swallowing.    Eyes: Negative.    Respiratory: Positive for cough. Negative for hemoptysis, sputum production, shortness of breath, wheezing and stridor.         Patient reports intermittent coughing with throat irritation.  Reports shortness " of breath with minimal activity, relief with rest.   Cardiovascular: Negative.    Gastrointestinal: Positive for diarrhea. Negative for abdominal pain, blood in stool, constipation, heartburn, melena, nausea and vomiting.        Patient reports intermittent diarrhea as transitions from tube feeding formula to oral diet.   Genitourinary: Negative.    Musculoskeletal: Negative.    Skin: Negative.    Neurological: Positive for dizziness and tingling. Negative for tremors, sensory change, speech change, focal weakness, seizures, loss of consciousness, weakness and headaches.        Patient reports dizziness at baseline when lying flat - Dr. Luther updated.   Patient reports numbness of left face, lips, tongue and scalp.   Endo/Heme/Allergies: Negative.    Psychiatric/Behavioral: Negative.      Nurse face-to-face time: Level 5:  over 15 min face to face time.    Hilda Barron RN BSN OCN CBCN

## 2023-04-03 ENCOUNTER — TELEPHONE (OUTPATIENT)
Dept: OTOLARYNGOLOGY | Facility: CLINIC | Age: 73
End: 2023-04-03

## 2023-04-03 RX ORDER — CHLORHEXIDINE GLUCONATE ORAL RINSE 1.2 MG/ML
15 SOLUTION DENTAL EVERY 8 HOURS
Qty: 473 ML | Refills: 0 | Status: SHIPPED | OUTPATIENT
Start: 2023-04-03 | End: 2023-04-26

## 2023-04-03 NOTE — TELEPHONE ENCOUNTER
[facility] Call Center    Phone Message    May a detailed message be left on voicemail: yes     Reason for Call: Other: Pt recently had surgery and it was mentioned that he may need PT for his jaw and he's wondering if he still needs this or if this was missed, please call him to discuss further, thanks     Action Taken: Other: ENT    Travel Screening: Not Applicable

## 2023-04-05 ENCOUNTER — TELEPHONE (OUTPATIENT)
Dept: OTOLARYNGOLOGY | Facility: CLINIC | Age: 73
End: 2023-04-05
Payer: COMMERCIAL

## 2023-04-05 NOTE — TELEPHONE ENCOUNTER
Spoke to patient in regards to his question. Pt asked about further direction in regards to speech/swallow therapy that was recommended when he had surgery as well as during his post op appointment. He has not heard from anyone so is wondering if this still needs to happen. pts provider and RN are out of clinic this week, but writer advised patient that she would reach out to SLP who performed the swallow study for further input on the plan of care and would get back to patient. Patient agreeable to this and was appreciative of call.

## 2023-04-05 NOTE — TELEPHONE ENCOUNTER
[facility] Call Center    Phone Message    May a detailed message be left on voicemail: yes     Reason for Call: Other: per pt he has a question he would like to ask. Pt did not give me anymore information. Please call to discuss. Thank you     Action Taken: Message routed to:  [facility] ENT    Travel Screening: Not Applicable

## 2023-04-05 NOTE — PROGRESS NOTES
Speech-Language Pathology Department   EVALUATION  [facility]  Clinical swallow evaluation      03/29/23 1400   General Information   Type Of Visit Initial   Start Of Care Date 03/29/23   Referring Physician Dr. 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 He was seen today in conjunction with ENT clinic visit and recommended to participate in clinical swallow evaluation prior to advancement of diet. He has been medically cleared for advancement of his diet. He reports strong desire to initiate po intake.   Respiratory Status Room air   Prior Level Of Function Swallowing   Prior Level Of Function Comment NPO since surgery with NG in place   Home/community Accessibility Comments (flowsheet Row) Indepenent   General Observations Pt highly pleasant and cooperative throught evaluation   Patient/family Goals Pt reports desire to return to eating/drinking   Clinical Swallow Evaluation   Oral Musculature anomalies present   Structural Abnormalities present   Dentition present and adequate  (No  lower dentition on left side following surgery and reconstruction)   Mucosal Quality adequate   Mandibular Strength and Mobility intact   Oral Labial Strength and Mobility impaired pursing;impaired seal  (left lower lip weakness)   Lingual Strength and Mobility WFL   Velar Elevation intact   Buccal Strength and Mobility intact   Laryngeal Function  Cough;Swallow;Throat clear;Voicing initiated   Clinical Swallow Eval: Thin Liquid Texture Trial   Mode of Presentation, Thin Liquids cup;self-fed   Volume of Liquid or Food Presented 12 oz water by cup   Oral Phase of Swallow WFL   Pharyngeal Phase of Swallow intact   Diagnostic Statement No overt clinical s/sx of aspiration/penetration noted on thin liquid trials.   Clinical Swallow Eval: Puree Solid Texture Trial   Mode of Presentation, Puree spoon;self-fed   Volume of Puree Presented 2 oz puree by spoon   Oral Phase, Puree WFL   Pharyngeal Phase, Puree intact   Diagnostic Statement No overt clincial s/sx of aspiration/penetration noted on puree consistency trials. Pt demonstrates no oral residue. He has decreased sensation on the left side of his mouth   Clinical Swallow Eval: Soft & Bite-sized   Mode of Presentation self-fed;spoon   Volume Presented spoonfuls of mixed fruit cup   Oral Phase Effortful AP movement  (mastication only on the right side)   Pharyngeal Phase repeated swallows;throat clearing   Diagnostic Statement Pt demonstrated single episode of coughing/throat clearing on bite size fruit cup. He was talking while eating/swallowing. He identified the situation and noted he tried to swallow too much while talking. Encouraged him to decrease talking during PO intake.   Clinical Swallow Eval: Regular (Solid)   Mode of Presentation self-fed   Volume Presented 1 Iliana Doone cookie   Oral Phase Other (see comments)  (Mastication only on the right side. No oral residue noted after liquid sip.)   Pharyngeal Phase intact   Diagnostic Statement No overt clinical s/sx of aspiration/penetration noted on cookie consistency trials. Mastication only on the right side. Pt used sips of liquid to help clear the cookie crumbs from the mouth.   Swallow Compensations   Swallow Compensations Alternate viscosity of consistencies;Reduce amounts   Educational Assessment   Barriers to Learning No barriers   Esophageal Phase  of Swallow   Patient reports or presents with symptoms of esophageal dysphagia No   General Therapy Interventions   Planned Therapy Interventions Dysphagia Treatment   Dysphagia treatment Oropharyngeal exercise training;Modified diet education;Instruction of safe swallow strategies;Compensatory strategies for swallowing   Swallow Eval: Clinical Impressions   Skilled Criteria for Therapy Intervention Skilled criteria met.  Treatment indicated.   Functional Assessment Scale (FAS) 5   Treatment Diagnosis Mild oropharyngeal dysphagia.   Diet texture recommendations Regular diet;Thin liquids (level 0)  (medically cleared to advance to regulars)   Recommended Feeding/Eating Techniques alternate between small bites and sips of food/liquid;maintain upright posture during/after eating for 30 mins;small sips/bites  (Attend to task during po intake)   Predicted Duration of Therapy Intervention (days/wks) 2-4x/month x 3 months   Anticipated Discharge Disposition home w/ outpatient services   Risks and Benefits of Treatment have been explained. Yes   Patient, family and/or staff in agreement with Plan of Care Yes   Clinical Impression Comments Overall, pt demonstrates mild oropharyngeal dysphagia as characterized by single episode of coughing on fruit cup and decreased ability to masticate on the right side after surgery with removal of lower dentition. He demonstrates decreased movement of his left lower lip post surgery. He demonstrates adequate labial seal during all sips and bites. He reports swallowing felt slightly efortful at the beginning of the evaluation. He demonstrates adequate jaw ROM and lingual movement. He is at risk for biting his lower lip when he closes his mouth. He demonstrated clear vocal quality during the evaluation. Recommend he advance to regular solids and thin liquids. Sit upright for po intake. Alternate bites and sips. Small bites/sips. Encouraged attention to task. 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   Swallow Goal 1   Goal Identifier PO   Goal Description Pt will tolerate least restrictive diet while adhering to safe swallow precautions independently across 3 months time.   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   Target Date 06/27/23   Total Session Time   SLP Eval: oral/pharyngeal swallow function, clinical minutes (55938) 25   Total Evaluation Time 25     Direct supervision provided for SILVER Alfonso, throughout patient contact and documentation process.     Thank you for the referral of Jenaro Kerr. If you have any questions about this report, please contact me using the information below.      Delfina Roger MS, CCC-SLP  Speech-Language Pathology  [facility] [place]  Department of Otolaryngology/D&T - 4th floor  Phone: [phone]  Email: [email or web address]

## 2023-04-06 ENCOUNTER — MEDICAL CORRESPONDENCE (OUTPATIENT)
Dept: HEALTH INFORMATION MANAGEMENT | Facility: CLINIC | Age: 73
End: 2023-04-06
Payer: COMMERCIAL

## 2023-04-06 DIAGNOSIS — C06.0 SQUAMOUS CELL CARCINOMA OF ORAL MUCOSA (H): Primary | ICD-10-CM

## 2023-04-11 ENCOUNTER — VIRTUAL VISIT (OUTPATIENT)
Dept: ONCOLOGY | Facility: CLINIC | Age: 73
End: 2023-04-11
Payer: COMMERCIAL

## 2023-04-11 DIAGNOSIS — C06.0 SQUAMOUS CELL CARCINOMA OF ORAL MUCOSA (H): Primary | ICD-10-CM

## 2023-04-11 PROCEDURE — 97802 MEDICAL NUTRITION INDIV IN: CPT | Mod: VID | Performed by: DIETITIAN, REGISTERED

## 2023-04-11 NOTE — PATIENT INSTRUCTIONS
Chace Atkins,     I have attached the resources that I referred to during our conversation (see your email on file)   --Sources of protein  --Oral Head/neck cancer tips  --High protein food sources  --Soft/moist, high protein foods  --High calorie, high protein recipes  --Add'l high calorie, high protein reicpes    Here are your nutrition goals during treatment:  --Calories: 2400/day  --Protein: 100 grams/day  --Fluids: >6-8 cups water/electrolte fluids/day    I plan to follow-up with you in ~ 3 weeks.  Please let me know if you have any questions along the way.     Be well,     Nithya Jean RD, , LD  Board Certified Specialist in Oncology Nutrition  [facility]  Oncology Services  [email or web address]   Office: [phone]  Fax: [phone]

## 2023-04-11 NOTE — LETTER
4/11/2023         RE: Jenaro Kerr  [address]  [place] [place] [facility] [address]        Dear Colleague,    Thank you for referring your patient, Jenaro Kerr, to the [facility] [place]. Please see a copy of my visit note below.    Video-Visit Details     Type of service:  Video Visit     Video Start Time (time video started): 1:02pm     Video End Time (time video stopped): 1:32pm    Originating Location (pt. 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   Wilbert tells me that he can't take as big of bites since surgery.   He cannot swallow a large mouthful of foods, thus, cuts them into pieces.   Other than that, he has been eating very well and eats all types of foods and textures.     Current diet/appetite: general diet/good appetite and intake  Chemotherapy: No chemo  Radiation: Starting 4/20  Surgery history: Surgery  PEG tube: No - had NG tube    Treatment Plan:  Oncology History    No history exists.   Treatment plan has been reviewed.    Wilbert has been eating 3 meals/day and snacks throughout the day as well.   He has been able to replete his weight within 5 lbs of his baseline since surgery with striving for extra nutrition.   He drinks and nutrition shake at breakfast, eats Mom's Meals at lunch and makes a home dinner.    He has been snacking on salty foods and nuts.      Diet Recall  Breakfast CIB w/ skim milk or Ensure Plus  "  Lunch Mom's meal, fruit or jello cup, pudding   Dinner Home made pasta w/ protein, cooked vegetables   Snacks Cashews, pistachios, ritz crackers; HS chips, cheetos   Beverages Water     ANTHROPOMETRICS  Height: 70\"  Weight: 176 lbs/80kg  BMI: 25  Weight History:   Wt Readings from Last 8 Encounters:   03/31/23 80.2 kg (176 lb 14.4 oz)   03/21/23 81.5 kg (179 lb 10.8 oz)   03/01/23 86.9 kg (191 lb 8 oz)   02/22/23 83.9 kg (185 lb)     Medications/vitamins/minerals/herbals:   Reviewed    Labs:   Labs reviewed    NUTRITION FOCUSED PHYSICAL ASSESSMENT FOR DIAGNOSING MALNUTRITION:  Consult for education only      ASSESSED NUTRITION NEEDS:  Estimated Energy Needs: 2400 kcals (30 Kcal/Kg)  Justification: maintenance  Estimated Protein Needs: 100 grams protein (1.2 g pro/Kg)  Justification: maintenance  Estimated Fluid Needs: 0414-3762  mL   Justification: maintenance    MALNUTRITION:  % Weight Loss:  Weight loss does not meet criteria for malnutrition   % Intake:  No decreased intake noted  Subcutaneous Fat Loss:  None observed  Muscle Loss:  None observed  Fluid Retention:  None noted    Malnutrition Diagnosis: Patient does not meet two of the above criteria necessary for diagnosing malnutrition    NUTRITION DIAGNOSIS:  Predicted suboptimal nutrient intake related to cancer treatment to head/neck region     INTERVENTIONS  Provided written & verbal education:     - Reviewed nutrition and hydration needs.   Advised pt to aim for at least 2400kcal and 100g protein daily.    Advised pt to aim for 6-8 cups non-caffeine containing beverages (water/electrolytes) daily.    - Discussed strategies to help fortify meals and snacks. Encouraged to focus on small, frequent meals.  Reviewed sources of protein. Encouraged to have a protein source with each meal and snack.    - Reviewed common barriers to eating with cancer treatment.  Discussed ways to cope with mucositis.   - Reviewed oral nutrition supplement options (Weight daina " powders, Ensure Complete, Ensure Plus/Boost Plus, CIB with Fairlife milk etc).   - Encouraged utilizing these ONS in home made shakes/smoothies to prevent flavor fatigue.      Provided pt with corresponding education materials/handouts on:  --Sources of protein  --Oral Head/neck cancer tips  --High protein food sources  --Soft/moist, high protein foods  --High calorie, high protein recipes  --Add'l high calorie, high protein reicpes    Pt verbalize understanding of materials provided during consult.   Patient Understanding: good  Expected patient engagement: good     Goals  1.  Aim for 2400kcal and 100g protein/day  2. Weight maintenance     Follow-Up Plans: Pt has RD contact information for questions.      MONITORING AND EVALUATION: 3 week follow up  -Food/beverage intake  -Weight trends    Nithya Jean RD, , LD        Again, thank you for allowing me to participate in the care of your patient.        Sincerely,        Nithya Jean RD

## 2023-04-11 NOTE — PROGRESS NOTES
"Video-Visit Details     Type of service:  Video Visit     Video Start Time (time video started): 1:02pm     Video End Time (time video stopped): 1:32pm    Originating Location (pt. 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   Wilbert tells me that he can't take as big of bites since surgery.   He cannot swallow a large mouthful of foods, thus, cuts them into pieces.   Other than that, he has been eating very well and eats all types of foods and textures.     Current diet/appetite: general diet/good appetite and intake  Chemotherapy: No chemo  Radiation: Starting 4/20  Surgery history: Surgery  PEG tube: No - had NG tube    Treatment Plan:  Oncology History    No history exists.   Treatment plan has been reviewed.    Wilbert has been eating 3 meals/day and snacks throughout the day as well.   He has been able to replete his weight within 5 lbs of his baseline since surgery with striving for extra nutrition.   He drinks and nutrition shake at breakfast, eats Mom's Meals at lunch and makes a home dinner.    He has been snacking on salty foods and nuts.      Diet Recall  Breakfast CIB w/ skim milk or Ensure Plus   Lunch Mom's meal, fruit or jello cup, pudding   Dinner Home made pasta w/ protein, cooked vegetables   Snacks Cashews, pistachios, ritz crackers; HS chips, cheetos   Beverages Water     ANTHROPOMETRICS  Height: 70\"  Weight: 176 lbs/80kg  BMI: 25  Weight " History:   Wt Readings from Last 8 Encounters:   03/31/23 80.2 kg (176 lb 14.4 oz)   03/21/23 81.5 kg (179 lb 10.8 oz)   03/01/23 86.9 kg (191 lb 8 oz)   02/22/23 83.9 kg (185 lb)     Medications/vitamins/minerals/herbals:   Reviewed    Labs:   Labs reviewed    NUTRITION FOCUSED PHYSICAL ASSESSMENT FOR DIAGNOSING MALNUTRITION:  Consult for education only      ASSESSED NUTRITION NEEDS:  Estimated Energy Needs: 2400 kcals (30 Kcal/Kg)  Justification: maintenance  Estimated Protein Needs: 100 grams protein (1.2 g pro/Kg)  Justification: maintenance  Estimated Fluid Needs: 3328-3290  mL   Justification: maintenance    MALNUTRITION:  % Weight Loss:  Weight loss does not meet criteria for malnutrition   % Intake:  No decreased intake noted  Subcutaneous Fat Loss:  None observed  Muscle Loss:  None observed  Fluid Retention:  None noted    Malnutrition Diagnosis: Patient does not meet two of the above criteria necessary for diagnosing malnutrition    NUTRITION DIAGNOSIS:  Predicted suboptimal nutrient intake related to cancer treatment to head/neck region     INTERVENTIONS  Provided written & verbal education:     - Reviewed nutrition and hydration needs.   Advised pt to aim for at least 2400kcal and 100g protein daily.    Advised pt to aim for 6-8 cups non-caffeine containing beverages (water/electrolytes) daily.    - Discussed strategies to help fortify meals and snacks. Encouraged to focus on small, frequent meals.  Reviewed sources of protein. Encouraged to have a protein source with each meal and snack.    - Reviewed common barriers to eating with cancer treatment.  Discussed ways to cope with mucositis.   - Reviewed oral nutrition supplement options (Weight daina powders, Ensure Complete, Ensure Plus/Boost Plus, CIB with Fairlife milk etc).   - Encouraged utilizing these ONS in home made shakes/smoothies to prevent flavor fatigue.      Provided pt with corresponding education materials/handouts on:  --Sources of  protein  --Oral Head/neck cancer tips  --High protein food sources  --Soft/moist, high protein foods  --High calorie, high protein recipes  --Add'l high calorie, high protein reicpes    Pt verbalize understanding of materials provided during consult.   Patient Understanding: good  Expected patient engagement: good     Goals  1.  Aim for 2400kcal and 100g protein/day  2. Weight maintenance     Follow-Up Plans: Pt has RD contact information for questions.      MONITORING AND EVALUATION: 3 week follow up  -Food/beverage intake  -Weight trends    Nithya Jean RD, , LD

## 2023-04-14 ENCOUNTER — APPOINTMENT (OUTPATIENT)
Dept: RADIATION ONCOLOGY | Facility: CLINIC | Age: 73
End: 2023-04-14
Payer: COMMERCIAL

## 2023-04-14 PROCEDURE — 77301 RADIOTHERAPY DOSE PLAN IMRT: CPT | Performed by: SURGERY

## 2023-04-14 PROCEDURE — 77300 RADIATION THERAPY DOSE PLAN: CPT | Performed by: SURGERY

## 2023-04-14 PROCEDURE — 77338 DESIGN MLC DEVICE FOR IMRT: CPT | Performed by: SURGERY

## 2023-04-18 ENCOUNTER — THERAPY VISIT (OUTPATIENT)
Dept: SPEECH THERAPY | Facility: CLINIC | Age: 73
End: 2023-04-18
Attending: OTOLARYNGOLOGY
Payer: COMMERCIAL

## 2023-04-18 ENCOUNTER — ANCILLARY PROCEDURE (OUTPATIENT)
Dept: GENERAL RADIOLOGY | Facility: CLINIC | Age: 73
End: 2023-04-18
Attending: OTOLARYNGOLOGY
Payer: COMMERCIAL

## 2023-04-18 DIAGNOSIS — C06.0 SQUAMOUS CELL CARCINOMA OF ORAL MUCOSA (H): ICD-10-CM

## 2023-04-18 DIAGNOSIS — C06.0 SQUAMOUS CELL CARCINOMA OF ORAL MUCOSA (H): Primary | ICD-10-CM

## 2023-04-18 DIAGNOSIS — R13.12 OROPHARYNGEAL DYSPHAGIA: Primary | ICD-10-CM

## 2023-04-18 PROCEDURE — 92611 MOTION FLUOROSCOPY/SWALLOW: CPT | Mod: GN | Performed by: SPEECH-LANGUAGE PATHOLOGIST

## 2023-04-18 PROCEDURE — 74230 X-RAY XM SWLNG FUNCJ C+: CPT | Mod: GC | Performed by: RADIOLOGY

## 2023-04-18 PROCEDURE — 92526 ORAL FUNCTION THERAPY: CPT | Mod: GN | Performed by: SPEECH-LANGUAGE PATHOLOGIST

## 2023-04-18 RX ORDER — BARIUM SULFATE 400 MG/ML
SUSPENSION ORAL ONCE
Status: COMPLETED | OUTPATIENT
Start: 2023-04-18 | End: 2023-04-18

## 2023-04-18 RX ORDER — GABAPENTIN 300 MG/1
1200 CAPSULE ORAL 3 TIMES DAILY
Qty: 720 CAPSULE | Refills: 0 | Status: SHIPPED | OUTPATIENT
Start: 2023-04-18 | End: 2023-07-12

## 2023-04-18 RX ADMIN — BARIUM SULFATE: 400 SUSPENSION ORAL at 10:06

## 2023-04-18 NOTE — PROGRESS NOTES
Speech-Language Pathology Department   EVALUATION  [facility]  Video swallow study      04/18/23 1100   General Information   Type Of Visit Initial   Start Of Care Date 04/18/23   Referring Physician Dr. Vivi Medrano   Orders Evaluate And Treat   Orders Comment VFSS   Medical Diagnosis SCC left buccal mucosa   Onset Of Illness/injury Or Date Of Surgery 03/16/23  (Surgery date)   Precautions/limitations Swallowing Precautions   Hearing Adequate in quiet setting   Pertinent History of Current Problem/OT: Additional Occupational Profile Janna Kerr is a 73-year-old man who underwent WLE of left buccal mucosa, left marginal mandibulectomy, left neck dissection 1b-4 and reconstruction with left RFFF on 3/16/23 for SCC of the left buccal mucosa. He is seen today for baseline VFSS prior to the start of radiation tx. Pt reports that he is still having diffulty with opening his jaw. He demonstrated the ability to open his jaw 2 fingers length.  He reports that he occasionally has anterior spillage d/t his left lip being numb.   Respiratory Status Room air   Prior Level Of Function Communications   Prior Level Of Function Comment Regular diet with thin liquids since clinical swallow evaluation   Home/community Accessibility Comments (flowsheet Row) Independent   General Observations Pt highly pleasant and cooperative throught evaluation   Clinical Swallow Evaluation   Oral Musculature anomalies present   Structural Abnormalities present   Dentition present and adequate  (No dentition in lower left area d/t surgery and reconstruction.)   Mucosal Quality adequate   Mandibular Strength and Mobility intact   Oral Labial Strength and Mobility impaired pursing  (left lower lip weakeness)   Lingual Strength and Mobility WFL   Velar Elevation intact   Buccal Strength and Mobility intact   Laryngeal Function Swallow;Voicing initiated   VFSS Evaluation   VFSS Additional  Documentation Yes   VFSS Eval: Radiology   Radiologist Resident Radiologist   Views Taken left lateral;A/P   Physical Location of Procedure [facility]   VFSS Eval: Thin Liquid Texture Trial   Mode of Presentation, Thin Liquid cup;self-fed   Order of Presentation Series 1, 2, 6, 13, 14 and Series 15 and 16 with hold breath prior to swallow utilized   Preparatory Phase WFL   Oral Phase, Thin Liquid WFL;Residue in oral cavity  (Mild residue in oral cavity following initial swallow and requiring secondary swallow to clear)   Pharyngeal Phase, Thin Liquid Residue in valleculae  (trace amounts of residue)   Rosenbek's Penetration Aspiration Scale: Thin Liquid Trial Results 6 - contrast passes glottis, no subglottic residue remains (aspiration)   Response to Aspiration absent response, silent aspiration   Diagnostic Statement Pt demonstrated mild to moderate oropharyngeal dysphagia with thin liquid consistency characterized by mild oral residue requiring secondary swallow for clearance and trace to mild residue in valleculae. Pt additionally demonstrated penetration on multiple trials and one instance of silent aspiration in which it passed the glottis but no residue remained. Pt was trianed and trialed with hold breath prior to swallow. Pt demonstrated adequate laryngeal elevation/closure during these trials.   VFSS Eval: Mildly Thick Liquids    Mode of Presentation cup;self-fed   Order of Presentation Series 7 and 8   Preparatory Phase WFL   Oral Phase Residue in oral cavity  (Mild residue cleared to trace amounts with secondary swallow)   Pharyngeal Phase Residue in valleculae  (Mild residue cleared to trace amounts with secondary swallow)   Rosenbek's Penetration Aspiration Scale 1 - no aspiration, contrast does not enter airway   Diagnostic Statement Pt demonstrated mild oropharyngeal dysphagia with mildly thick liquids characterized by mild oral and valleculae residue cleared to trace amounts with secondary  swallow. No penetration or aspiration noted.   VFSS Eval: Puree Solid Texture Trial   Mode of Presentation, Puree spoon;self-fed   Order of Presentation Series 9 and 10   Preparatory Phase WFL   Oral Phase, Puree WFL   Pharyngeal Phase, Puree WFL;Residue in valleculae  (Trace amounts of residue in valleculae)   Rosenbek's Penetration Aspiration Scale: Puree Food Trial Results 1 - no aspiration, contrast does not enter airway   Diagnostic Statement WFL oropharyngeal swallowing with puree solid consistency. Pt demonstrated trace amounts of residue in valleculae. No penetration or aspiration noted.   VFSS Eval: Regular Texture Trial (Solid)   Mode of Presentation spoon;self-fed   Order of Presentation Cookie series 11 and 12; Barium tablet series 17   Preparatory Phase WFL  (Noted that pt has to chew on right side d/t surgery and reconstruction on lower left side.)   Oral Phase WFL   Pharyngeal Phase Residue in valleculae;Residue in pyriform sinus  (Trace to mild amounts of residue in valleculae and pyriforms.)   Rosenbek's Penetration Aspiration Scale 1 - no aspiration, contrast does not enter airway   Diagnostic Statement WFL oropharyngeal swallowing with regular solid consistency. Pt demonstrated trace to mild residue in valleculae and pyriforms. No penetration or aspiration noted.   Swallow Compensations   Swallow Compensations Alternate viscosity of consistencies;Reduce amounts   Educational Assessment   Barriers to Learning No barriers   Esophageal Phase of Swallow   Patient reports or presents with symptoms of esophageal dysphagia No   General Therapy Interventions   Planned Therapy Interventions Dysphagia Treatment   Dysphagia treatment Oropharyngeal exercise training;Modified diet education;Instruction of safe swallow strategies;Compensatory strategies for swallowing   Swallow Eval: Clinical Impressions   Skilled Criteria for Therapy Intervention Skilled criteria met.  Treatment indicated.   Functional  Assessment Scale (FAS) 5   Dysphagia Outcome Severity Scale (NETO) Level 5 - NETO   Treatment Diagnosis Mild oropharyngeal dysphagia   Diet texture recommendations Thin liquids (level 0);Regular diet   Recommended Feeding/Eating Techniques alternate between small bites and sips of food/liquid;small sips/bites   Predicted Duration of Therapy Intervention (days/wks) 2-4x/month x 3 months   Anticipated Discharge Disposition home w/ outpatient services   Risks and Benefits of Treatment have been explained. Yes   Patient, family and/or staff in agreement with Plan of Care Yes   Clinical Impression Comments Overall, pt demonstrates mild oropharyngeal dysphagia as characterized by mild oral and valleculae residue requiring second swallow to clear, trace to mild residue in pyriforms, penetration with thin liquids and one instance of silent aspiration with large sip of thin liquid. During aspiration epsiode, pt demonstrated liquid passing the glottis and no residue remaining. Pt's oral mechanism was remarkable decreased movement of his left lower lip upon protrusion. He demonstrates adequate labial seal during all sips and bites. During trials of thin liquid, holding breathe prior to swallow was trained and trialed. Pt demonstrated adequate laryngeal closure and no penetration during this strategy. Recommend pt continue regular diet with thin liquids with small bites/sips and decreased distractions. Recommend pt particpate in speech pathology services to monitor swallowing ability during and following radiation tx. He will benefit from completing swallowing exercises and maintainging a least restrictive diet during tx.   Swallow Goals   SLP Swallow Goals 1;2   Swallow Goal 1   Goal Identifier PO   Goal Description Pt will tolerate least restrictive diet while adhering to safe swallow precautions independently across 3 months time.   Goal Progress Pt was educated on the changes that can occur during radiation that may impact  the pts swallowing ability. Pt provided with handouts regarding changes. Pt additionally educated on tips and tricks for when changes occur.   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   Goal Progress Pt educated on the chnages that can occur to the swallowing function during and after radiation tx. Pt educated on the hpw the exercises are helpful in maintaining swallowing function during and after tx. Pt was able to demonstrate one repetition of each of the 5 exercises.   Target Date 06/27/23   Total Session Time   SLP Eval: oral/pharyngeal swallow function, clinical minutes (99483) 44   Total Evaluation Time 27     Direct supervision provided for SILVER Alfonso, throughout patient contact and documentation process.     Thank you for the referral of Jenaro Kerr. If you have any questions about this report, please contact me using the information below.      Delfina Roger MS, CCC-SLP  Speech-Language Pathology  [facility]  Department of Otolaryngology/D&T - 4th floor  Phone: [phone]  Email: [email or web address]

## 2023-04-19 ENCOUNTER — APPOINTMENT (OUTPATIENT)
Dept: RADIATION ONCOLOGY | Facility: CLINIC | Age: 73
End: 2023-04-19
Payer: COMMERCIAL

## 2023-04-19 ENCOUNTER — ALLIED HEALTH/NURSE VISIT (OUTPATIENT)
Dept: RADIATION ONCOLOGY | Facility: CLINIC | Age: 73
End: 2023-04-19
Payer: COMMERCIAL

## 2023-04-19 DIAGNOSIS — C06.0 SQUAMOUS CELL CARCINOMA OF ORAL MUCOSA (H): Primary | ICD-10-CM

## 2023-04-19 PROCEDURE — 99207 PR NO CHARGE NURSE ONLY: CPT

## 2023-04-19 PROCEDURE — 77014 PR CT GUIDE FOR PLACEMENT RADIATION THERAPY FIELDS: CPT | Performed by: SURGERY

## 2023-04-19 NOTE — NURSING NOTE
Teaching Flowsheet   Relevant Diagnosis: head and neck cancer  Teaching Topic: radiation therapy     Person(s) involved in teaching:   Patient and wife     Motivation Level:  Asks Questions: Yes  Eager to Learn: Yes  Cooperative: Yes  Receptive (willing/able to accept information): Yes  Any cultural factors/Catholic beliefs that may influence understanding or compliance? No       Patient and wife demonstrates understanding of the following:  Reason for the appointment, diagnosis and treatment plan: Yes  Knowledge of proper use of medications and conditions for which they are ordered (with special attention to potential side effects or drug interactions): Yes  Which situations necessitate calling provider and whom to contact: Yes       Teaching Concerns Addressed:   Comments: radiation therapy side effects: fatigue, skin changes and skin cares, dry mouth and thick saliva, mouth and throat sores, taste changes, pain/difficulty with swallowing     Proper use and care of  (medical equip, care aids, etc.): NA  Nutritional needs and diet plan: Yes  Pain management techniques: Yes  Wound Care: Yes  How and/when to access community resources: Yes     Instructional Materials Used/Given: radiation therapy educational handouts: head and neck cancer     Time spent with patient: 30 minutes.    Hilda Barron RN BSN OCN CBCN

## 2023-04-19 NOTE — PATIENT INSTRUCTIONS
Please contact [facility] RN with questions or concerns following today's appointment: [phone].    Thank you!

## 2023-04-20 ENCOUNTER — APPOINTMENT (OUTPATIENT)
Dept: RADIATION ONCOLOGY | Facility: CLINIC | Age: 73
End: 2023-04-20
Payer: COMMERCIAL

## 2023-04-20 PROCEDURE — 77386 PR IMRT TREATMENT DELIVERY, COMPLEX: CPT | Performed by: SURGERY

## 2023-04-20 PROCEDURE — 77014 PR CT GUIDE FOR PLACEMENT RADIATION THERAPY FIELDS: CPT | Performed by: SURGERY

## 2023-04-20 NOTE — PROGRESS NOTES
Pt comes in PO 3/16/23 s/p Left Ulnar Free Flap for Buccal and Alveolar Reconstruction, R. Thigh STSG.    Sutures removed from left neck and left forearm.  Incision lines well approximated and healing nicely.  Left ulnar flap site is warm, pink and the graft has good take; absorbable sutures intact. Right thigh STSG site c/d/i. Calcium Alginate dressings no longer needed. Instructed pt and wife to apply Aquaphor to the wound bed, cover with Telfa and secure with Tegaderm.  When at home and if able, keep dressing off of thigh and just cover with Aquaphor.    Intraoral flap is warm and pink.    While removing sutures from left arm, pt became light headed and diaphoretic.  I proceeded to recline the head of the chair, of which pt said that made him feel worse. He said he felt like he was going to black out.    Placed cold packs on patients head which helped, but he said he still didn't feel right.  This last for about 5-10 minutes.  Pt then said he couldn't see, and quickly following, his speech became dysarthric and unintelligible.      I immediately grabbed Dr. Clement who came in to assess the patient.  Within about 30-60 seconds of her arrival, the pt started to talk more and speech was clear.  He remained pale and diaphoretic, but said he was feeling a little better.    Pt was given apple juice and water, which helped his symptoms.    SLP evaluated pt's swallow and determined he could have the NG removed.    Pt was back to baseline prior to leaving the clinic appt.    F/u with Dr. Clement in one month.    Tiffani Rubio RN  4/20/2023 2:28 PM

## 2023-04-21 ENCOUNTER — APPOINTMENT (OUTPATIENT)
Dept: RADIATION ONCOLOGY | Facility: CLINIC | Age: 73
End: 2023-04-21
Payer: COMMERCIAL

## 2023-04-21 PROCEDURE — 77386 PR IMRT TREATMENT DELIVERY, COMPLEX: CPT | Performed by: SURGERY

## 2023-04-21 PROCEDURE — 77014 PR CT GUIDE FOR PLACEMENT RADIATION THERAPY FIELDS: CPT | Performed by: SURGERY

## 2023-04-24 ENCOUNTER — APPOINTMENT (OUTPATIENT)
Dept: RADIATION ONCOLOGY | Facility: CLINIC | Age: 73
End: 2023-04-24
Payer: COMMERCIAL

## 2023-04-24 PROCEDURE — 77014 PR CT GUIDE FOR PLACEMENT RADIATION THERAPY FIELDS: CPT | Performed by: RADIOLOGY

## 2023-04-24 PROCEDURE — 77386 PR IMRT TREATMENT DELIVERY, COMPLEX: CPT | Performed by: RADIOLOGY

## 2023-04-25 ENCOUNTER — APPOINTMENT (OUTPATIENT)
Dept: RADIATION ONCOLOGY | Facility: CLINIC | Age: 73
End: 2023-04-25
Payer: COMMERCIAL

## 2023-04-25 PROCEDURE — 77014 PR CT GUIDE FOR PLACEMENT RADIATION THERAPY FIELDS: CPT | Performed by: SURGERY

## 2023-04-25 PROCEDURE — 77386 PR IMRT TREATMENT DELIVERY, COMPLEX: CPT | Performed by: SURGERY

## 2023-04-26 ENCOUNTER — APPOINTMENT (OUTPATIENT)
Dept: RADIATION ONCOLOGY | Facility: CLINIC | Age: 73
End: 2023-04-26
Payer: COMMERCIAL

## 2023-04-26 ENCOUNTER — OFFICE VISIT (OUTPATIENT)
Dept: RADIATION ONCOLOGY | Facility: CLINIC | Age: 73
End: 2023-04-26
Payer: COMMERCIAL

## 2023-04-26 VITALS
BODY MASS INDEX: 27.12 KG/M2 | TEMPERATURE: 98 F | RESPIRATION RATE: 18 BRPM | HEART RATE: 69 BPM | DIASTOLIC BLOOD PRESSURE: 86 MMHG | SYSTOLIC BLOOD PRESSURE: 144 MMHG | OXYGEN SATURATION: 98 % | WEIGHT: 189 LBS

## 2023-04-26 DIAGNOSIS — C06.0 SQUAMOUS CELL CARCINOMA OF ORAL MUCOSA (H): Primary | ICD-10-CM

## 2023-04-26 PROCEDURE — 77336 RADIATION PHYSICS CONSULT: CPT | Performed by: SURGERY

## 2023-04-26 PROCEDURE — 77386 PR IMRT TREATMENT DELIVERY, COMPLEX: CPT | Performed by: SURGERY

## 2023-04-26 PROCEDURE — 77427 RADIATION TX MANAGEMENT X5: CPT | Performed by: SURGERY

## 2023-04-26 PROCEDURE — 99207 PR DROP WITH A PROCEDURE: CPT | Performed by: SURGERY

## 2023-04-26 PROCEDURE — 77014 PR CT GUIDE FOR PLACEMENT RADIATION THERAPY FIELDS: CPT | Performed by: SURGERY

## 2023-04-26 ASSESSMENT — PAIN SCALES - GENERAL: PAINLEVEL: NO PAIN (0)

## 2023-04-26 NOTE — PROGRESS NOTES
[facility]  SPECIALIZING IN BREAKTHROUGHS  Radiation Oncology    On Treatment Visit Note      Jenaro Kerr      Date: 2023   MRN: [id number]   : 1950  Diagnosis: oral cavity squamous cell carcinoma        ID: Mr. 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 Kitchen) with pathology revealing 3.0cm SCC, 7mm DOI, no LVSI, no PNI, negative margins (close <1mm margin deep), no bony invasion.      Reason for Visit:  On Radiation Treatment Visit       Treatment Summary to Date  Treatment Site: left buccal Current Dose: 1000/6600 cGy Fractions:       Chemotherapy  Chemo concurrent with radx?: No (ENT: Dr. Ballard)    Subjective:   No complaints, tolerating RT well overall.    Pain Control: Gabapentin 600mg TID  Fluid Intake: Adequate  Nutrition: PO   Rinses: Adequate  Skin care: Aquaphor BID  Chemotherapy: no chemotherapy     Nursing ROS:   Nutrition Alteration  Diet Type: Patient's Preference  Skin  Skin Reaction: 0 - No changes  Skin Intervention: patient reports applying Aquaphor two times daily     ENT and Mouth Exam  ENT/Mouth Note: patient confirms doing baking soda and salt rinses, denies taste changes, reports right tongue sore - relief with baking soda and salt rinses, patient reports slight taste changes  Cardiovascular  Respiratory effort: 1 - Normal - without distress        Psychosocial  Mood - Anxiety: 0 - Normal  Mood - Depression: 0 - Normal  Psychosocial Note: energy level at baseline  Pain Assessment  0-10 Pain Scale: 0      Objective:   BP (!) 144/86 (BP Location: Right arm, Patient Position: Chair, Cuff Size: Adult Regular)   Pulse 69   Temp 98  F (36.7  C) (Oral)   Resp 18   Wt 85.7 kg (189 lb)   SpO2 98%   BMI 27.12 kg/m    Gen: Appears well, in no acute distress  HN: left buccal mucosa and reconstruction site well healed, no mucositis   Skin: No  erythema  CV/Resp: rrr, breathing comfortably on room air  Neuro: CN 2-12 grossly intact, UE/LE full strength     Labs:  CBC RESULTS: Recent Labs   Lab Test 03/21/23  0611   WBC 8.6   RBC 4.27*   HGB 13.1*   HCT 40.8   MCV 96   MCH 30.7   MCHC 32.1   RDW 12.6        ELECTROLYTES:  Recent Labs   Lab Test 03/21/23  0611      POTASSIUM 4.8   CHLORIDE 109*   SAMINA 9.0   CO2 28   BUN 21.6   CR 0.77   *       Assessment:    Tolerating radiation therapy well.  All questions and concerns addressed.      Plan:   1. Continue current therapy.    2. Continue gabapentin, rinses, nutrition, hydration, skin care      Mosaiq chart and setup information reviewed  Ports checked and MVCT/IGRT images checked    Medication Review  Med list reviewed with patient?: Yes  Med Note: patient reports currently taking Gabapentin, dose as of 4/25/2023 was 600 mg po TID and continues to increase dose daily    Educational Topic Discussed  Education Instructions: radiation therapy side effects: fatigue, skin changes and skin cares, taste changes, dry mouth and thick saliva, mouth and throat sores, pain/difficulty with swallowing    Jimmy Luther MD  Radiation Oncology   [facility]  Clinic: [phone]

## 2023-04-26 NOTE — PATIENT INSTRUCTIONS
Please contact [facility] RN with questions or concerns following today's appointment: [phone].       Please feel free to leave a detailed message if your call is not answered.    If your call is not received before 3:00 PM, it may not be returned until the following business day.    If you are receiving radiation treatment and need assistance after 3:00 PM or on the weekends, please call [phone] and ask to speak to the radiation oncologist on-call.    Thank you!    Hilda WEBSTER

## 2023-04-26 NOTE — LETTER
2023         RE: Jenaro Kerr  [address]  [place] [facility] [place] [facility] [address]        Dear Colleague,    Thank you for referring your patient, Jenaro Kerr, to the [facility]COL[facility] [place]. Please see a copy of my visit note below.    [facility]  SPECIALIZING IN BREAKTHROUGHS  Radiation Oncology    On Treatment Visit Note      Jenaro Kerr      Date: 2023   MRN: [id number]   : 1950  Diagnosis: oral cavity squamous cell carcinoma        ID: Mr. 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 Kitchen) with pathology revealing 3.0cm SCC, 7mm DOI, no LVSI, no PNI, negative margins (close <1mm margin deep), no bony invasion.      Reason for Visit:  On Radiation Treatment Visit       Treatment Summary to Date  Treatment Site: left buccal Current Dose: 1000/6600 cGy Fractions:       Chemotherapy  Chemo concurrent with radx?: No (ENT: Dr. Ballard)    Subjective:   No complaints, tolerating RT well overall.    Pain Control: Gabapentin 600mg TID  Fluid Intake: Adequate  Nutrition: PO   Rinses: Adequate  Skin care: Aquaphor BID  Chemotherapy: no chemotherapy     Nursing ROS:   Nutrition Alteration  Diet Type: Patient's Preference  Skin  Skin Reaction: 0 - No changes  Skin Intervention: patient reports applying Aquaphor two times daily     ENT and Mouth Exam  ENT/Mouth Note: patient confirms doing baking soda and salt rinses, denies taste changes, reports right tongue sore - relief with baking soda and salt rinses, patient reports slight taste changes  Cardiovascular  Respiratory effort: 1 - Normal - without distress        Psychosocial  Mood - Anxiety: 0 - Normal  Mood - Depression: 0 - Normal  Psychosocial Note: energy level at baseline  Pain Assessment  0-10 Pain Scale: 0      Objective:   BP (!) 144/86 (BP Location: Right arm, Patient Position: Chair, Cuff Size:  Adult Regular)   Pulse 69   Temp 98  F (36.7  C) (Oral)   Resp 18   Wt 85.7 kg (189 lb)   SpO2 98%   BMI 27.12 kg/m    Gen: Appears well, in no acute distress  HN: left buccal mucosa and reconstruction site well healed, no mucositis   Skin: No erythema  CV/Resp: rrr, breathing comfortably on room air  Neuro: CN 2-12 grossly intact, UE/LE full strength     Labs:  CBC RESULTS: Recent Labs   Lab Test 03/21/23  0611   WBC 8.6   RBC 4.27*   HGB 13.1*   HCT 40.8   MCV 96   MCH 30.7   MCHC 32.1   RDW 12.6        ELECTROLYTES:  Recent Labs   Lab Test 03/21/23  0611      POTASSIUM 4.8   CHLORIDE 109*   SAMINA 9.0   CO2 28   BUN 21.6   CR 0.77   *       Assessment:    Tolerating radiation therapy well.  All questions and concerns addressed.      Plan:   1. Continue current therapy.    2. Continue gabapentin, rinses, nutrition, hydration, skin care      Mosaiq chart and setup information reviewed  Ports checked and MVCT/IGRT images checked    Medication Review  Med list reviewed with patient?: Yes  Med Note: patient reports currently taking Gabapentin, dose as of 4/25/2023 was 600 mg po TID and continues to increase dose daily    Educational Topic Discussed  Education Instructions: radiation therapy side effects: fatigue, skin changes and skin cares, taste changes, dry mouth and thick saliva, mouth and throat sores, pain/difficulty with swallowing    Jimmy Luther MD  Radiation Oncology   [facility]  Clinic: [phone]          Again, thank you for allowing me to participate in the care of your patient.        Sincerely,        Jimmy Luther MD

## 2023-04-27 ENCOUNTER — APPOINTMENT (OUTPATIENT)
Dept: RADIATION ONCOLOGY | Facility: CLINIC | Age: 73
End: 2023-04-27
Payer: COMMERCIAL

## 2023-04-27 PROCEDURE — 77386 PR IMRT TREATMENT DELIVERY, COMPLEX: CPT | Performed by: SURGERY

## 2023-04-27 PROCEDURE — 77014 PR CT GUIDE FOR PLACEMENT RADIATION THERAPY FIELDS: CPT | Performed by: SURGERY

## 2023-04-28 ENCOUNTER — APPOINTMENT (OUTPATIENT)
Dept: RADIATION ONCOLOGY | Facility: CLINIC | Age: 73
End: 2023-04-28
Payer: COMMERCIAL

## 2023-04-28 PROCEDURE — 77014 PR CT GUIDE FOR PLACEMENT RADIATION THERAPY FIELDS: CPT | Performed by: SURGERY

## 2023-04-28 PROCEDURE — 77386 PR IMRT TREATMENT DELIVERY, COMPLEX: CPT | Performed by: SURGERY

## 2023-05-01 ENCOUNTER — APPOINTMENT (OUTPATIENT)
Dept: RADIATION ONCOLOGY | Facility: CLINIC | Age: 73
End: 2023-05-01
Payer: COMMERCIAL

## 2023-05-01 PROCEDURE — 77014 PR CT GUIDE FOR PLACEMENT RADIATION THERAPY FIELDS: CPT | Performed by: RADIOLOGY

## 2023-05-01 PROCEDURE — 77386 PR IMRT TREATMENT DELIVERY, COMPLEX: CPT | Performed by: RADIOLOGY

## 2023-05-02 ENCOUNTER — APPOINTMENT (OUTPATIENT)
Dept: RADIATION ONCOLOGY | Facility: CLINIC | Age: 73
End: 2023-05-02
Payer: COMMERCIAL

## 2023-05-02 PROCEDURE — 77386 PR IMRT TREATMENT DELIVERY, COMPLEX: CPT | Performed by: SURGERY

## 2023-05-02 PROCEDURE — 77014 PR CT GUIDE FOR PLACEMENT RADIATION THERAPY FIELDS: CPT | Performed by: SURGERY

## 2023-05-03 ENCOUNTER — APPOINTMENT (OUTPATIENT)
Dept: RADIATION ONCOLOGY | Facility: CLINIC | Age: 73
End: 2023-05-03
Payer: COMMERCIAL

## 2023-05-03 ENCOUNTER — OFFICE VISIT (OUTPATIENT)
Dept: RADIATION ONCOLOGY | Facility: CLINIC | Age: 73
End: 2023-05-03
Payer: COMMERCIAL

## 2023-05-03 VITALS
RESPIRATION RATE: 18 BRPM | HEART RATE: 63 BPM | DIASTOLIC BLOOD PRESSURE: 75 MMHG | TEMPERATURE: 98 F | SYSTOLIC BLOOD PRESSURE: 124 MMHG | OXYGEN SATURATION: 96 % | WEIGHT: 189 LBS | BODY MASS INDEX: 27.12 KG/M2

## 2023-05-03 DIAGNOSIS — C06.0 SQUAMOUS CELL CARCINOMA OF ORAL MUCOSA (H): Primary | ICD-10-CM

## 2023-05-03 PROCEDURE — 77336 RADIATION PHYSICS CONSULT: CPT | Performed by: SURGERY

## 2023-05-03 PROCEDURE — 99207 PR DROP WITH A PROCEDURE: CPT | Performed by: SURGERY

## 2023-05-03 PROCEDURE — 77427 RADIATION TX MANAGEMENT X5: CPT | Performed by: SURGERY

## 2023-05-03 PROCEDURE — 77014 PR CT GUIDE FOR PLACEMENT RADIATION THERAPY FIELDS: CPT | Performed by: SURGERY

## 2023-05-03 PROCEDURE — 77386 PR IMRT TREATMENT DELIVERY, COMPLEX: CPT | Performed by: SURGERY

## 2023-05-03 ASSESSMENT — PAIN SCALES - GENERAL: PAINLEVEL: NO PAIN (0)

## 2023-05-03 NOTE — PATIENT INSTRUCTIONS
Please contact [facility] RN with questions or concerns following today's appointment: [phone].       Please feel free to leave a detailed message if your call is not answered.    If your call is not received before 3:00 PM, it may not be returned until the following business day.    If you are receiving radiation treatment and need assistance after 3:00 PM or on the weekends, please call [phone] and ask to speak to the radiation oncologist on-call.    Thank you!    Hilda WEBSTER

## 2023-05-03 NOTE — LETTER
5/3/2023         RE: Jenaro Kerr  [address]  [place] [place] [facility] [address]        Dear Colleague,    Thank you for referring your patient, Jenaro Kerr, to the [facility]IATION [facility]COL[facility] [place]. Please see a copy of my visit note below.    [facility]  SPECIALIZING IN BREAKTHROUGHS  Radiation Oncology    On Treatment Visit Note      Jenaro Kerr      Date: May 3, 2023   MRN: [id number]   : 1950  Diagnosis: oral cavity squamous cell carcinoma      ID: Mr. 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 Kitchen) with pathology revealing 3.0cm SCC, 7mm DOI, no LVSI, no PNI, negative margins (close <1mm margin deep), no bony invasion.      Reason for Visit:  On Radiation Treatment Visit       Treatment Summary to Date  Treatment Site: left buccal Current Dose: 2000/6600 cGy Fractions: 10/33      Chemotherapy  Chemo concurrent with radx?: No (ENT: Dr. Ballard)    Subjective:   No complaints, tolerating RT well overall. Developing oral cavity sores, good PO intake, weight stable.    Pain Control: Gabapentin 1200mg TID, but having side effects  Fluid Intake: Adequate  Nutrition: PO   Rinses: Adequate  Skin care: Aquaphor BID  Chemotherapy: no chemotherapy     Nursing ROS:   Nutrition Alteration  Diet Type: Patient's Preference  Skin  Skin Reaction: 0 - No changes  Skin Intervention: patient reports applying Aquaphor two times daily     ENT and Mouth Exam  ENT/Mouth Note: patient confirms doing baking soda and salt rinses, denies taste changes, reports right mouth and tongue sore - relief with baking soda and salt rinses, patient reports slight taste changes  Cardiovascular  Respiratory effort: 1 - Normal - without distress        Psychosocial  Mood - Anxiety: 0 - Normal  Mood - Depression: 0 - Normal  Psychosocial Note: slight fatigue per patient report  Pain Assessment  0-10 Pain  Scale: 0      Objective:   /75 (BP Location: Left arm, Patient Position: Chair, Cuff Size: Adult Regular)   Pulse 63   Temp 98  F (36.7  C) (Oral)   Resp 18   Wt 85.7 kg (189 lb)   SpO2 96%   BMI 27.12 kg/m    Gen: Appears well, in no acute distress  HN: left buccal mucosa mild mucositis , left buccal mucosa and reconstruction site well healed,  Skin: mild erythema, no skin breakdown  CV/Resp: rrr, breathing comfortably on room air  Neuro: CN 2-12 grossly intact, UE/LE full strength     Labs:  CBC RESULTS: Recent Labs   Lab Test 03/21/23  0611   WBC 8.6   RBC 4.27*   HGB 13.1*   HCT 40.8   MCV 96   MCH 30.7   MCHC 32.1   RDW 12.6        ELECTROLYTES:  Recent Labs   Lab Test 03/21/23  0611      POTASSIUM 4.8   CHLORIDE 109*   SAMINA 9.0   CO2 28   BUN 21.6   CR 0.77   *       Assessment:    Tolerating radiation therapy well.  All questions and concerns addressed.      Plan:   1. Continue current therapy.    2. Continue gabapentin, rinses, nutrition, hydration, skin care      Mosaiq chart and setup information reviewed  Ports checked and MVCT/IGRT images checked    Medication Review  Med list reviewed with patient?: Yes  Med Note: patient reports currently taking Gabapentin, recent dose of 1,200 mg TID, however will decrease to 900 mg at breakfast, 900 mg at lunch and 1,200 mg at dinner due to side effects    Educational Topic Discussed  Education Instructions: radiation therapy side effects: fatigue, skin changes and skin cares, taste changes, dry mouth and thick saliva, mouth and throat sores, pain/difficulty with swallowing    Jimmy Luther MD  Radiation Oncology   [facility]  Clinic: [phone]          Again, thank you for allowing me to participate in the care of your patient.        Sincerely,        Jimmy Luther MD

## 2023-05-03 NOTE — PROGRESS NOTES
[facility] PHYSICIANS  SPECIALIZING IN BREAKTHROUGHS  Radiation Oncology    On Treatment Visit Note      Jenaro Kerr      Date: May 3, 2023   MRN: [id number]   : 1950  Diagnosis: oral cavity squamous cell carcinoma      ID: Mr. 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 Kitchen) with pathology revealing 3.0cm SCC, 7mm DOI, no LVSI, no PNI, negative margins (close <1mm margin deep), no bony invasion.      Reason for Visit:  On Radiation Treatment Visit       Treatment Summary to Date  Treatment Site: left buccal Current Dose: 2000/6600 cGy Fractions: 10/33      Chemotherapy  Chemo concurrent with radx?: No (ENT: Dr. Ballard)    Subjective:   No complaints, tolerating RT well overall. Developing oral cavity sores, good PO intake, weight stable.    Pain Control: Gabapentin 1200mg TID, but having side effects  Fluid Intake: Adequate  Nutrition: PO   Rinses: Adequate  Skin care: Aquaphor BID  Chemotherapy: no chemotherapy     Nursing ROS:   Nutrition Alteration  Diet Type: Patient's Preference  Skin  Skin Reaction: 0 - No changes  Skin Intervention: patient reports applying Aquaphor two times daily     ENT and Mouth Exam  ENT/Mouth Note: patient confirms doing baking soda and salt rinses, denies taste changes, reports right mouth and tongue sore - relief with baking soda and salt rinses, patient reports slight taste changes  Cardiovascular  Respiratory effort: 1 - Normal - without distress        Psychosocial  Mood - Anxiety: 0 - Normal  Mood - Depression: 0 - Normal  Psychosocial Note: slight fatigue per patient report  Pain Assessment  0-10 Pain Scale: 0      Objective:   /75 (BP Location: Left arm, Patient Position: Chair, Cuff Size: Adult Regular)   Pulse 63   Temp 98  F (36.7  C) (Oral)   Resp 18   Wt 85.7 kg (189 lb)   SpO2 96%   BMI 27.12 kg/m    Gen: Appears well, in no acute  distress  HN: left buccal mucosa mild mucositis , left buccal mucosa and reconstruction site well healed,  Skin: mild erythema, no skin breakdown  CV/Resp: rrr, breathing comfortably on room air  Neuro: CN 2-12 grossly intact, UE/LE full strength     Labs:  CBC RESULTS: Recent Labs   Lab Test 03/21/23  0611   WBC 8.6   RBC 4.27*   HGB 13.1*   HCT 40.8   MCV 96   MCH 30.7   MCHC 32.1   RDW 12.6        ELECTROLYTES:  Recent Labs   Lab Test 03/21/23  0611      POTASSIUM 4.8   CHLORIDE 109*   SAMINA 9.0   CO2 28   BUN 21.6   CR 0.77   *       Assessment:    Tolerating radiation therapy well.  All questions and concerns addressed.      Plan:   1. Continue current therapy.    2. Continue gabapentin, rinses, nutrition, hydration, skin care      Mosaiq chart and setup information reviewed  Ports checked and MVCT/IGRT images checked    Medication Review  Med list reviewed with patient?: Yes  Med Note: patient reports currently taking Gabapentin, recent dose of 1,200 mg TID, however will decrease to 900 mg at breakfast, 900 mg at lunch and 1,200 mg at dinner due to side effects    Educational Topic Discussed  Education Instructions: radiation therapy side effects: fatigue, skin changes and skin cares, taste changes, dry mouth and thick saliva, mouth and throat sores, pain/difficulty with swallowing    Jimmy Luther MD  Radiation Oncology   [facility]  Clinic: [phone]

## 2023-05-04 ENCOUNTER — APPOINTMENT (OUTPATIENT)
Dept: RADIATION ONCOLOGY | Facility: CLINIC | Age: 73
End: 2023-05-04
Payer: COMMERCIAL

## 2023-05-04 PROCEDURE — 77014 PR CT GUIDE FOR PLACEMENT RADIATION THERAPY FIELDS: CPT | Performed by: SURGERY

## 2023-05-04 PROCEDURE — 77386 PR IMRT TREATMENT DELIVERY, COMPLEX: CPT | Performed by: SURGERY

## 2023-05-05 ENCOUNTER — APPOINTMENT (OUTPATIENT)
Dept: RADIATION ONCOLOGY | Facility: CLINIC | Age: 73
End: 2023-05-05
Payer: COMMERCIAL

## 2023-05-05 PROCEDURE — 77386 PR IMRT TREATMENT DELIVERY, COMPLEX: CPT | Performed by: SURGERY

## 2023-05-05 PROCEDURE — 77014 PR CT GUIDE FOR PLACEMENT RADIATION THERAPY FIELDS: CPT | Performed by: SURGERY

## 2023-05-08 ENCOUNTER — APPOINTMENT (OUTPATIENT)
Dept: RADIATION ONCOLOGY | Facility: CLINIC | Age: 73
End: 2023-05-08
Payer: COMMERCIAL

## 2023-05-08 PROCEDURE — 77014 PR CT GUIDE FOR PLACEMENT RADIATION THERAPY FIELDS: CPT | Performed by: RADIOLOGY

## 2023-05-08 PROCEDURE — 77386 PR IMRT TREATMENT DELIVERY, COMPLEX: CPT | Performed by: RADIOLOGY

## 2023-05-09 ENCOUNTER — APPOINTMENT (OUTPATIENT)
Dept: RADIATION ONCOLOGY | Facility: CLINIC | Age: 73
End: 2023-05-09
Payer: COMMERCIAL

## 2023-05-09 PROCEDURE — 77386 PR IMRT TREATMENT DELIVERY, COMPLEX: CPT | Performed by: SURGERY

## 2023-05-09 PROCEDURE — 77014 PR CT GUIDE FOR PLACEMENT RADIATION THERAPY FIELDS: CPT | Performed by: SURGERY

## 2023-05-10 ENCOUNTER — APPOINTMENT (OUTPATIENT)
Dept: RADIATION ONCOLOGY | Facility: CLINIC | Age: 73
End: 2023-05-10
Payer: COMMERCIAL

## 2023-05-10 ENCOUNTER — OFFICE VISIT (OUTPATIENT)
Dept: RADIATION ONCOLOGY | Facility: CLINIC | Age: 73
End: 2023-05-10
Payer: COMMERCIAL

## 2023-05-10 VITALS
TEMPERATURE: 98 F | HEART RATE: 66 BPM | BODY MASS INDEX: 26.5 KG/M2 | WEIGHT: 184.7 LBS | SYSTOLIC BLOOD PRESSURE: 119 MMHG | OXYGEN SATURATION: 99 % | RESPIRATION RATE: 18 BRPM | DIASTOLIC BLOOD PRESSURE: 78 MMHG

## 2023-05-10 DIAGNOSIS — C06.0 SQUAMOUS CELL CARCINOMA OF ORAL MUCOSA (H): Primary | ICD-10-CM

## 2023-05-10 PROCEDURE — 77427 RADIATION TX MANAGEMENT X5: CPT | Performed by: SURGERY

## 2023-05-10 PROCEDURE — 77386 PR IMRT TREATMENT DELIVERY, COMPLEX: CPT | Performed by: SURGERY

## 2023-05-10 PROCEDURE — 99207 PR DROP WITH A PROCEDURE: CPT | Performed by: SURGERY

## 2023-05-10 PROCEDURE — 77014 PR CT GUIDE FOR PLACEMENT RADIATION THERAPY FIELDS: CPT | Performed by: SURGERY

## 2023-05-10 PROCEDURE — 77336 RADIATION PHYSICS CONSULT: CPT | Performed by: SURGERY

## 2023-05-10 ASSESSMENT — PAIN SCALES - GENERAL: PAINLEVEL: MILD PAIN (2)

## 2023-05-10 NOTE — LETTER
5/10/2023         RE: Jenaro Kerr  [address]  [place] [address]        Dear Colleague,    Thank you for referring your patient, Jenaro Kerr, to the [facility] RADIATION ONCOLOGY [place]. Please see a copy of my visit note below.    [facility] PHYSICIANS  SPECIALIZING IN BREAKTHROUGHS  Radiation Oncology    On Treatment Visit Note      Jenaro Kerr      Date: May 10, 2023   MRN: [id number]   : 1950  Diagnosis: oral cavity squamous cell carcinoma      ID: Mr. 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 Kitchen) with pathology revealing 3.0cm SCC, 7mm DOI, no LVSI, no PNI, negative margins (close <1mm margin deep), no bony invasion.      Reason for Visit:  On Radiation Treatment Visit       Treatment Summary to Date  Current Dose: 3000/6600 cGy Fractions: 15/33      Chemotherapy  Chemo concurrent with radx?: No (ENT: Dr. Ballard)    Subjective:   No complaints, tolerating RT well overall. Developing oral cavity sores, good PO intake, weight relatively stable.    Pain Control: Gabapentin 900, 900, 1200mg  Fluid Intake: Adequate  Nutrition: PO   Rinses: Adequate  Skin care: Aquaphor BID  Chemotherapy: no chemotherapy     Nursing ROS:   Nutrition Alteration  Diet Type: Patient's Preference  Skin  Skin Reaction: 0 - No changes  Skin Intervention: patient reports applying Aquaphor two times daily     ENT and Mouth Exam  ENT/Mouth Note: patient confirms doing baking soda and salt rinses, denies taste changes, reports right mouth and tongue sore - relief with baking soda and salt rinses, patient reports slight taste changes  Cardiovascular  Respiratory effort: 1 - Normal - without distress        Psychosocial  Mood - Anxiety: 0 - Normal  Mood - Depression: 0 - Normal  Psychosocial Note: slight fatigue per patient report  Pain Assessment  0-10 Pain Scale: 0      Objective:   BP  119/78 (BP Location: Right arm, Patient Position: Chair, Cuff Size: Adult Regular)   Pulse 66   Temp 98  F (36.7  C) (Oral)   Resp 18   Wt 83.8 kg (184 lb 11.2 oz)   SpO2 99%   BMI 26.50 kg/m    Gen: Appears well, in no acute distress  HN: left buccal mucosa mucositis , left buccal mucosa and reconstruction site well healed,  Skin: mild erythema, no skin breakdown  CV/Resp: rrr, breathing comfortably on room air  Neuro: CN 2-12 grossly intact, UE/LE full strength     Labs:  CBC RESULTS: Recent Labs   Lab Test 03/21/23  0611   WBC 8.6   RBC 4.27*   HGB 13.1*   HCT 40.8   MCV 96   MCH 30.7   MCHC 32.1   RDW 12.6        ELECTROLYTES:  Recent Labs   Lab Test 03/21/23  0611      POTASSIUM 4.8   CHLORIDE 109*   SAMINA 9.0   CO2 28   BUN 21.6   CR 0.77   *       Assessment:    Tolerating radiation therapy well.  All questions and concerns addressed.      Plan:   1. Continue current therapy.    2. Continue gabapentin, rinses, nutrition, hydration, skin care      Mosaiq chart and setup information reviewed  Ports checked and MVCT/IGRT images checked    Medication Review  Med list reviewed with patient?: Yes  Med Note: Decrease to 900 mg at breakfast, 900 mg at lunch and 1,200 mg at dinner due to side effects    Educational Topic Discussed  Education Instructions: radiation therapy side effects: fatigue, skin changes and skin cares, taste changes, dry mouth and thick saliva, mouth and throat sores, pain/difficulty with swallowing    Jimmy Luther MD  Radiation Oncology   [facility]  Clinic: [phone]          Again, thank you for allowing me to participate in the care of your patient.        Sincerely,        Jimmy Luther MD

## 2023-05-10 NOTE — PATIENT INSTRUCTIONS
Please contact [facility] RN with questions or concerns following today's appointment: [phone].       Please feel free to leave a detailed message if your call is not answered.    If your call is not received before 3:00 PM, it may not be returned until the following business day.    If you are receiving radiation treatment and need assistance after 3:00 PM or on the weekends, please call [phone] and ask to speak to the radiation oncologist on-call.    Thank you!    Hilda WEBSTER

## 2023-05-10 NOTE — PROGRESS NOTES
[facility] PHYSICIANS  SPECIALIZING IN BREAKTHROUGHS  Radiation Oncology    On Treatment Visit Note      Jenaro Kerr      Date: May 10, 2023   MRN: [id number]   : 1950  Diagnosis: oral cavity squamous cell carcinoma      ID: Mr. 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 Kitchen) with pathology revealing 3.0cm SCC, 7mm DOI, no LVSI, no PNI, negative margins (close <1mm margin deep), no bony invasion.      Reason for Visit:  On Radiation Treatment Visit       Treatment Summary to Date  Current Dose: 3000/6600 cGy Fractions: 15/33      Chemotherapy  Chemo concurrent with radx?: No (ENT: Dr. Ballard)    Subjective:   No complaints, tolerating RT well overall. Developing oral cavity sores, good PO intake, weight relatively stable.    Pain Control: Gabapentin 900, 900, 1200mg  Fluid Intake: Adequate  Nutrition: PO   Rinses: Adequate  Skin care: Aquaphor BID  Chemotherapy: no chemotherapy     Nursing ROS:   Nutrition Alteration  Diet Type: Patient's Preference  Skin  Skin Reaction: 0 - No changes  Skin Intervention: patient reports applying Aquaphor two times daily     ENT and Mouth Exam  ENT/Mouth Note: patient confirms doing baking soda and salt rinses, denies taste changes, reports right mouth and tongue sore - relief with baking soda and salt rinses, patient reports slight taste changes  Cardiovascular  Respiratory effort: 1 - Normal - without distress        Psychosocial  Mood - Anxiety: 0 - Normal  Mood - Depression: 0 - Normal  Psychosocial Note: slight fatigue per patient report  Pain Assessment  0-10 Pain Scale: 0      Objective:   /78 (BP Location: Right arm, Patient Position: Chair, Cuff Size: Adult Regular)   Pulse 66   Temp 98  F (36.7  C) (Oral)   Resp 18   Wt 83.8 kg (184 lb 11.2 oz)   SpO2 99%   BMI 26.50 kg/m    Gen: Appears well, in no acute distress  HN: left buccal mucosa  mucositis , left buccal mucosa and reconstruction site well healed,  Skin: mild erythema, no skin breakdown  CV/Resp: rrr, breathing comfortably on room air  Neuro: CN 2-12 grossly intact, UE/LE full strength     Labs:  CBC RESULTS: Recent Labs   Lab Test 03/21/23  0611   WBC 8.6   RBC 4.27*   HGB 13.1*   HCT 40.8   MCV 96   MCH 30.7   MCHC 32.1   RDW 12.6        ELECTROLYTES:  Recent Labs   Lab Test 03/21/23  0611      POTASSIUM 4.8   CHLORIDE 109*   SAMINA 9.0   CO2 28   BUN 21.6   CR 0.77   *       Assessment:    Tolerating radiation therapy well.  All questions and concerns addressed.      Plan:   1. Continue current therapy.    2. Continue gabapentin, rinses, nutrition, hydration, skin care      Mosaiq chart and setup information reviewed  Ports checked and MVCT/IGRT images checked    Medication Review  Med list reviewed with patient?: Yes  Med Note: Decrease to 900 mg at breakfast, 900 mg at lunch and 1,200 mg at dinner due to side effects    Educational Topic Discussed  Education Instructions: radiation therapy side effects: fatigue, skin changes and skin cares, taste changes, dry mouth and thick saliva, mouth and throat sores, pain/difficulty with swallowing    Jimmy Luther MD  Radiation Oncology   [facility]  Clinic: [phone]

## 2023-05-11 ENCOUNTER — APPOINTMENT (OUTPATIENT)
Dept: RADIATION ONCOLOGY | Facility: CLINIC | Age: 73
End: 2023-05-11
Payer: COMMERCIAL

## 2023-05-11 PROCEDURE — 77014 PR CT GUIDE FOR PLACEMENT RADIATION THERAPY FIELDS: CPT | Performed by: SURGERY

## 2023-05-11 PROCEDURE — 77386 PR IMRT TREATMENT DELIVERY, COMPLEX: CPT | Performed by: SURGERY

## 2023-05-12 ENCOUNTER — APPOINTMENT (OUTPATIENT)
Dept: RADIATION ONCOLOGY | Facility: CLINIC | Age: 73
End: 2023-05-12
Payer: COMMERCIAL

## 2023-05-12 PROCEDURE — 77014 PR CT GUIDE FOR PLACEMENT RADIATION THERAPY FIELDS: CPT | Performed by: SURGERY

## 2023-05-12 PROCEDURE — 77386 PR IMRT TREATMENT DELIVERY, COMPLEX: CPT | Performed by: SURGERY

## 2023-05-15 ENCOUNTER — APPOINTMENT (OUTPATIENT)
Dept: RADIATION ONCOLOGY | Facility: CLINIC | Age: 73
End: 2023-05-15
Payer: COMMERCIAL

## 2023-05-15 PROCEDURE — 77014 PR CT GUIDE FOR PLACEMENT RADIATION THERAPY FIELDS: CPT | Performed by: SURGERY

## 2023-05-15 PROCEDURE — 77386 PR IMRT TREATMENT DELIVERY, COMPLEX: CPT | Performed by: SURGERY

## 2023-05-16 ENCOUNTER — APPOINTMENT (OUTPATIENT)
Dept: RADIATION ONCOLOGY | Facility: CLINIC | Age: 73
End: 2023-05-16
Payer: COMMERCIAL

## 2023-05-16 PROCEDURE — 77014 PR CT GUIDE FOR PLACEMENT RADIATION THERAPY FIELDS: CPT | Performed by: SURGERY

## 2023-05-16 PROCEDURE — 77386 PR IMRT TREATMENT DELIVERY, COMPLEX: CPT | Performed by: SURGERY

## 2023-05-17 ENCOUNTER — OFFICE VISIT (OUTPATIENT)
Dept: RADIATION ONCOLOGY | Facility: CLINIC | Age: 73
End: 2023-05-17
Payer: COMMERCIAL

## 2023-05-17 ENCOUNTER — APPOINTMENT (OUTPATIENT)
Dept: RADIATION ONCOLOGY | Facility: CLINIC | Age: 73
End: 2023-05-17
Payer: COMMERCIAL

## 2023-05-17 VITALS
OXYGEN SATURATION: 96 % | BODY MASS INDEX: 26.21 KG/M2 | RESPIRATION RATE: 18 BRPM | DIASTOLIC BLOOD PRESSURE: 71 MMHG | SYSTOLIC BLOOD PRESSURE: 113 MMHG | TEMPERATURE: 98 F | WEIGHT: 182.7 LBS | HEART RATE: 62 BPM

## 2023-05-17 DIAGNOSIS — C06.0 SQUAMOUS CELL CARCINOMA OF ORAL MUCOSA (H): Primary | ICD-10-CM

## 2023-05-17 PROCEDURE — 77427 RADIATION TX MANAGEMENT X5: CPT | Performed by: SURGERY

## 2023-05-17 PROCEDURE — 77336 RADIATION PHYSICS CONSULT: CPT | Performed by: SURGERY

## 2023-05-17 PROCEDURE — 77014 PR CT GUIDE FOR PLACEMENT RADIATION THERAPY FIELDS: CPT | Performed by: SURGERY

## 2023-05-17 PROCEDURE — 99207 PR DROP WITH A PROCEDURE: CPT | Performed by: SURGERY

## 2023-05-17 PROCEDURE — 77386 PR IMRT TREATMENT DELIVERY, COMPLEX: CPT | Performed by: SURGERY

## 2023-05-17 ASSESSMENT — PAIN SCALES - GENERAL: PAINLEVEL: NO PAIN (0)

## 2023-05-17 NOTE — LETTER
2023         RE: Jenaro Kerr  [address]  [place] [address]        Dear Colleague,    Thank you for referring your patient, Jenaro Kerr, to the [facility] RADIATION ONCOLOGY [place]. Please see a copy of my visit note below.    [facility] PHYSICIANS  SPECIALIZING IN BREAKTHROUGHS  Radiation Oncology    On Treatment Visit Note      Jenaro Kerr      Date: May 17, 2023   MRN: [id number]   : 1950  Diagnosis: oral cavity squamous cell carcinoma        ID: Mr. 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 Kitchen) with pathology revealing 3.0cm SCC, 7mm DOI, no LVSI, no PNI, negative margins (close <1mm margin deep), no bony invasion.      Reason for Visit:  On Radiation Treatment Visit       Treatment Summary to Date  Treatment Site: left buccal Current Dose: 4000/6600 cGy Fractions:       Chemotherapy  Chemo concurrent with radx?: No (ENT: Dr. Ballard)    Subjective:   No complaints, tolerating RT well overall. Developing oral cavity sores, good PO intake, weight relatively stable. Stable from last week.    Pain Control: Gabapentin 900, 900, 1200mg  Fluid Intake: Adequate  Nutrition: PO   Rinses: Adequate  Skin care: Aquaphor BID  Chemotherapy: no chemotherapy     Nursing ROS:   Nutrition Alteration  Diet Type: Patient's Preference  Skin  Skin Reaction: 2 - Moderate to brisk erythema, patchy moist desquamation, mostly confined to skin folds and creases, moderate edema (no desquamation)  Skin Intervention: patient reports applying Aquaphor two times daily     ENT and Mouth Exam  Mucositis - Current: 1 - Generalized erythema  Mucositis - Internal Severity: 1 - Mild, able to eat and drink  ENT/Mouth Note: patient confirms doing baking soda and salt rinses every 30 minutes, reports continued taste changes  Cardiovascular  Respiratory effort: 1 - Normal - without distress         Psychosocial  Mood - Anxiety: 0 - Normal  Mood - Depression: 0 - Normal  Psychosocial Note: increasing fatigue per patient  Pain Assessment  0-10 Pain Scale: 0      Objective:   /71 (BP Location: Right arm, Patient Position: Chair, Cuff Size: Adult Regular)   Pulse 62   Temp 98  F (36.7  C) (Oral)   Resp 18   Wt 82.9 kg (182 lb 11.2 oz)   SpO2 96%   BMI 26.21 kg/m    Gen: Appears well, in no acute distress  HN: left buccal mucosa mucositis , left buccal mucosa and reconstruction site well healed,  Skin: mild erythema, no skin breakdown  CV/Resp: rrr, breathing comfortably on room air  Neuro: CN 2-12 grossly intact, UE/LE full strength     Labs:  CBC RESULTS: Recent Labs   Lab Test 03/21/23  0611   WBC 8.6   RBC 4.27*   HGB 13.1*   HCT 40.8   MCV 96   MCH 30.7   MCHC 32.1   RDW 12.6        ELECTROLYTES:  Recent Labs   Lab Test 03/21/23  0611      POTASSIUM 4.8   CHLORIDE 109*   SAMINA 9.0   CO2 28   BUN 21.6   CR 0.77   *       Assessment:    Tolerating radiation therapy well.  All questions and concerns addressed.      Plan:   1. Continue current therapy.    2. Continue gabapentin, rinses, nutrition, hydration, skin care      Mosaiq chart and setup information reviewed  Ports checked and MVCT/IGRT images checked    Medication Review  Med list reviewed with patient?: Yes  Med Note: patient reports currently taking Gabapentin 900 mg at breakfast, 900 mg at lunch and 1,200 mg at dinner    Educational Topic Discussed  Education Instructions: radiation therapy side effects: fatigue, skin changes and skin cares, taste changes, dry mouth and thick saliva, mouth and throat sores, pain/difficulty with swallowing    Jimmy Luther MD  Radiation Oncology   [facility]  Clinic: [phone]          Again, thank you for allowing me to participate in the care of your patient.        Sincerely,        Jimmy Luther MD     Destruction After The Procedure: No

## 2023-05-17 NOTE — PROGRESS NOTES
[facility] PHYSICIANS  SPECIALIZING IN BREAKTHROUGHS  Radiation Oncology    On Treatment Visit Note      Jenaro Kerr      Date: May 17, 2023   MRN: [id number]   : 1950  Diagnosis: oral cavity squamous cell carcinoma        ID: Mr. 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 Kitchen) with pathology revealing 3.0cm SCC, 7mm DOI, no LVSI, no PNI, negative margins (close <1mm margin deep), no bony invasion.      Reason for Visit:  On Radiation Treatment Visit       Treatment Summary to Date  Treatment Site: left buccal Current Dose: 4000/6600 cGy Fractions:       Chemotherapy  Chemo concurrent with radx?: No (ENT: Dr. Ballard)    Subjective:   No complaints, tolerating RT well overall. Developing oral cavity sores, good PO intake, weight relatively stable. Stable from last week.    Pain Control: Gabapentin 900, 900, 1200mg  Fluid Intake: Adequate  Nutrition: PO   Rinses: Adequate  Skin care: Aquaphor BID  Chemotherapy: no chemotherapy     Nursing ROS:   Nutrition Alteration  Diet Type: Patient's Preference  Skin  Skin Reaction: 2 - Moderate to brisk erythema, patchy moist desquamation, mostly confined to skin folds and creases, moderate edema (no desquamation)  Skin Intervention: patient reports applying Aquaphor two times daily     ENT and Mouth Exam  Mucositis - Current: 1 - Generalized erythema  Mucositis - Internal Severity: 1 - Mild, able to eat and drink  ENT/Mouth Note: patient confirms doing baking soda and salt rinses every 30 minutes, reports continued taste changes  Cardiovascular  Respiratory effort: 1 - Normal - without distress        Psychosocial  Mood - Anxiety: 0 - Normal  Mood - Depression: 0 - Normal  Psychosocial Note: increasing fatigue per patient  Pain Assessment  0-10 Pain Scale: 0      Objective:   /71 (BP Location: Right arm, Patient Position: Chair, Cuff Size:  Adult Regular)   Pulse 62   Temp 98  F (36.7  C) (Oral)   Resp 18   Wt 82.9 kg (182 lb 11.2 oz)   SpO2 96%   BMI 26.21 kg/m    Gen: Appears well, in no acute distress  HN: left buccal mucosa mucositis , left buccal mucosa and reconstruction site well healed,  Skin: mild erythema, no skin breakdown  CV/Resp: rrr, breathing comfortably on room air  Neuro: CN 2-12 grossly intact, UE/LE full strength     Labs:  CBC RESULTS: Recent Labs   Lab Test 03/21/23  0611   WBC 8.6   RBC 4.27*   HGB 13.1*   HCT 40.8   MCV 96   MCH 30.7   MCHC 32.1   RDW 12.6        ELECTROLYTES:  Recent Labs   Lab Test 03/21/23  0611      POTASSIUM 4.8   CHLORIDE 109*   SAMINA 9.0   CO2 28   BUN 21.6   CR 0.77   *       Assessment:    Tolerating radiation therapy well.  All questions and concerns addressed.      Plan:   1. Continue current therapy.    2. Continue gabapentin, rinses, nutrition, hydration, skin care      Mosaiq chart and setup information reviewed  Ports checked and MVCT/IGRT images checked    Medication Review  Med list reviewed with patient?: Yes  Med Note: patient reports currently taking Gabapentin 900 mg at breakfast, 900 mg at lunch and 1,200 mg at dinner    Educational Topic Discussed  Education Instructions: radiation therapy side effects: fatigue, skin changes and skin cares, taste changes, dry mouth and thick saliva, mouth and throat sores, pain/difficulty with swallowing    Jimmy Luther MD  Radiation Oncology   [facility]  Clinic: [phone]

## 2023-05-17 NOTE — PATIENT INSTRUCTIONS
Please contact [facility] RN with questions or concerns following today's appointment: [phone].       Please feel free to leave a detailed message if your call is not answered.    If your call is not received before 3:00 PM, it may not be returned until the following business day.    If you are receiving radiation treatment and need assistance after 3:00 PM or on the weekends, please call [phone] and ask to speak to the radiation oncologist on-call.    Thank you!    Hilda WEBSTER

## 2023-05-18 ENCOUNTER — APPOINTMENT (OUTPATIENT)
Dept: RADIATION ONCOLOGY | Facility: CLINIC | Age: 73
End: 2023-05-18
Payer: COMMERCIAL

## 2023-05-18 PROCEDURE — 77014 PR CT GUIDE FOR PLACEMENT RADIATION THERAPY FIELDS: CPT | Performed by: SURGERY

## 2023-05-18 PROCEDURE — 77386 PR IMRT TREATMENT DELIVERY, COMPLEX: CPT | Performed by: SURGERY

## 2023-05-19 ENCOUNTER — APPOINTMENT (OUTPATIENT)
Dept: RADIATION ONCOLOGY | Facility: CLINIC | Age: 73
End: 2023-05-19
Payer: COMMERCIAL

## 2023-05-19 PROCEDURE — 77014 PR CT GUIDE FOR PLACEMENT RADIATION THERAPY FIELDS: CPT | Performed by: SURGERY

## 2023-05-19 PROCEDURE — 77386 PR IMRT TREATMENT DELIVERY, COMPLEX: CPT | Performed by: SURGERY

## 2023-05-22 ENCOUNTER — APPOINTMENT (OUTPATIENT)
Dept: RADIATION ONCOLOGY | Facility: CLINIC | Age: 73
End: 2023-05-22
Payer: COMMERCIAL

## 2023-05-22 PROCEDURE — 77014 PR CT GUIDE FOR PLACEMENT RADIATION THERAPY FIELDS: CPT | Performed by: RADIOLOGY

## 2023-05-22 PROCEDURE — 77386 PR IMRT TREATMENT DELIVERY, COMPLEX: CPT | Performed by: RADIOLOGY

## 2023-05-23 ENCOUNTER — APPOINTMENT (OUTPATIENT)
Dept: RADIATION ONCOLOGY | Facility: CLINIC | Age: 73
End: 2023-05-23
Payer: COMMERCIAL

## 2023-05-23 PROCEDURE — 77014 PR CT GUIDE FOR PLACEMENT RADIATION THERAPY FIELDS: CPT | Performed by: RADIOLOGY

## 2023-05-23 PROCEDURE — 77386 PR IMRT TREATMENT DELIVERY, COMPLEX: CPT | Performed by: RADIOLOGY

## 2023-05-24 ENCOUNTER — OFFICE VISIT (OUTPATIENT)
Dept: RADIATION ONCOLOGY | Facility: CLINIC | Age: 73
End: 2023-05-24
Payer: COMMERCIAL

## 2023-05-24 ENCOUNTER — APPOINTMENT (OUTPATIENT)
Dept: RADIATION ONCOLOGY | Facility: CLINIC | Age: 73
End: 2023-05-24
Payer: COMMERCIAL

## 2023-05-24 VITALS
BODY MASS INDEX: 26.11 KG/M2 | WEIGHT: 182 LBS | OXYGEN SATURATION: 98 % | DIASTOLIC BLOOD PRESSURE: 77 MMHG | TEMPERATURE: 98 F | HEART RATE: 62 BPM | RESPIRATION RATE: 18 BRPM | SYSTOLIC BLOOD PRESSURE: 118 MMHG

## 2023-05-24 DIAGNOSIS — C06.0 SQUAMOUS CELL CARCINOMA OF ORAL MUCOSA (H): Primary | ICD-10-CM

## 2023-05-24 PROCEDURE — 99207 PR DROP WITH A PROCEDURE: CPT | Performed by: SURGERY

## 2023-05-24 PROCEDURE — 77014 PR CT GUIDE FOR PLACEMENT RADIATION THERAPY FIELDS: CPT | Performed by: SURGERY

## 2023-05-24 PROCEDURE — 77386 PR IMRT TREATMENT DELIVERY, COMPLEX: CPT | Performed by: SURGERY

## 2023-05-24 PROCEDURE — 77427 RADIATION TX MANAGEMENT X5: CPT | Performed by: SURGERY

## 2023-05-24 PROCEDURE — 77336 RADIATION PHYSICS CONSULT: CPT | Performed by: SURGERY

## 2023-05-24 RX ORDER — MINERAL OIL/HYDROPHIL PETROLAT
OINTMENT (GRAM) TOPICAL PRN
Qty: 420 G | Refills: 1 | Status: SHIPPED | OUTPATIENT
Start: 2023-05-24 | End: 2023-08-30

## 2023-05-24 ASSESSMENT — PAIN SCALES - GENERAL: PAINLEVEL: NO PAIN (0)

## 2023-05-24 NOTE — ANESTHESIA POSTPROCEDURE EVALUATION
Patient: Jenaro Kerr    Procedure: Procedure(s):  wide local excision of buccal mucosa  left marginal mandibulectomy  left modified radical neck dissection  nasogastric tube placement  left forearm free flap  split thickness skin graft from right thigh       Anesthesia Type:  General    Note:  Disposition: Inpatient   Postop Pain Control: Uneventful            Sign Out: Well controlled pain   PONV: No   Neuro/Psych: Uneventful            Sign Out: Acceptable/Baseline neuro status   Airway/Respiratory: Uneventful            Sign Out: Acceptable/Baseline resp. status   CV/Hemodynamics: Uneventful            Sign Out: Acceptable CV status; No obvious hypovolemia; No obvious fluid overload   Other NRE: NONE   DID A NON-ROUTINE EVENT OCCUR? No           Last vitals:  Vitals:    03/20/23 1800 03/20/23 2335 03/21/23 0823   BP:  119/72 (!) 138/93   Pulse:  55 69   Resp:  16 16   Temp:  36.4  C (97.5  F) 36.1  C (96.9  F)   SpO2: 97% 96% 97%       Electronically Signed By: César Wise MD  May 24, 2023  6:26 PM

## 2023-05-24 NOTE — PATIENT INSTRUCTIONS
Please contact [facility] RN with questions or concerns following today's appointment: [phone].       Please feel free to leave a detailed message if your call is not answered.    If your call is not received before 3:00 PM, it may not be returned until the following business day.    If you are receiving radiation treatment and need assistance after 3:00 PM or on the weekends, please call [phone] and ask to speak to the radiation oncologist on-call.    Thank you!    Hilda WEBSTER

## 2023-05-24 NOTE — PROGRESS NOTES
[facility] PHYSICIANS  SPECIALIZING IN BREAKTHROUGHS  Radiation Oncology    On Treatment Visit Note      Jenaro Kerr      Date: May 24, 2023   MRN: [id number]   : 1950  Diagnosis: oral cavity squamous cell carcinoma        ID: Mr. 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 Kitchen) with pathology revealing 3.0cm SCC, 7mm DOI, no LVSI, no PNI, negative margins (close <1mm margin deep), no bony invasion.     Reason for Visit:  On Radiation Treatment Visit       Treatment Summary to Date  Treatment Site: left buccal Current Dose: 5000/6600 cGy Fractions:       Chemotherapy  Chemo concurrent with radx?: No (ENT: Dr. Ballard)    Subjective:   No complaints, tolerating RT well overall. Developing oral cavity sores, good PO intake, weight relatively stable. Stable from last week.    Pain Control: Gabapentin 900, 900, 1200mg  Fluid Intake: Adequate  Nutrition: PO   Rinses: Adequate  Skin care: Aquaphor BID  Chemotherapy: no chemotherapy     Nursing ROS:   Nutrition Alteration  Diet Type: Patient's Preference  Skin  Skin Reaction: 2 - Moderate to brisk erythema, patchy moist desquamation, mostly confined to skin folds and creases, moderate edema (no desquamation)  Skin Intervention: patient reports applying Aquaphor two times daily     ENT and Mouth Exam  Mucositis - Current: 1 - Generalized erythema  Mucositis - Internal Severity: 1 - Mild, able to eat and drink  ENT/Mouth Note: patient confirms doing baking soda and salt rinses every 30 minutes, reports taste changes, patient reports nose bleeds in the morning after blowing nose, reviewed use of Aquaphor inside nose at bedtime  Cardiovascular  Respiratory effort: 1 - Normal - without distress        Psychosocial  Mood - Anxiety: 0 - Normal  Mood - Depression: 0 - Normal  Psychosocial Note: increasing fatigue per patient  Pain Assessment  0-10 Pain  Scale: 0      Objective:   /77 (BP Location: Left arm, Patient Position: Chair, Cuff Size: Adult Regular)   Pulse 62   Temp 98  F (36.7  C) (Oral)   Resp 18   Wt 82.6 kg (182 lb)   SpO2 98%   BMI 26.11 kg/m    Gen: Appears well, in no acute distress  HN: left buccal mucosa mucositis , left buccal mucosa and reconstruction site well healed,  Skin: moderate erythema, no skin breakdown}  CV/Resp: rrr, breathing comfortably on room air  Neuro: CN 2-12 grossly intact, UE/LE full strength     Labs:  CBC RESULTS: Recent Labs   Lab Test 03/21/23  0611   WBC 8.6   RBC 4.27*   HGB 13.1*   HCT 40.8   MCV 96   MCH 30.7   MCHC 32.1   RDW 12.6        ELECTROLYTES:  Recent Labs   Lab Test 03/21/23  0611      POTASSIUM 4.8   CHLORIDE 109*   SAMINA 9.0   CO2 28   BUN 21.6   CR 0.77   *       Assessment:    Tolerating radiation therapy well.  All questions and concerns addressed.      Plan:   1. Continue current therapy.    2. Continue gabapentin, rinses, nutrition, hydration, skin care      Mosaiq chart and setup information reviewed  Ports checked and MVCT/IGRT images checked    Medication Review  Med list reviewed with patient?: Yes  Med Note: patient reports currently taking Gabapentin 900 mg at breakfast, 900 mg at lunch and 1,200 mg at dinner    Educational Topic Discussed  Education Instructions: radiation therapy side effects: fatigue, skin changes and skin cares, taste changes, dry mouth and thick saliva, mouth and throat sores, pain/difficulty with swallowing    Jimmy Luther MD  Radiation Oncology   [facility]  Clinic: [phone]

## 2023-05-24 NOTE — LETTER
2023         RE: Jenaro Kerr  [address]  [facility] [address]        Dear Colleague,    Thank you for referring your patient, Jenaro Kerr, to the [facility]COL[facility] [place]. Please see a copy of my visit note below.    [facility] PHYSICIANS  SPECIALIZING IN BREAKTHROUGHS  Radiation Oncology    On Treatment Visit Note      Jenaro Kerr      Date: May 24, 2023   MRN: [id number]   : 1950  Diagnosis: oral cavity squamous cell carcinoma        ID: Mr. 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 Kitchen) with pathology revealing 3.0cm SCC, 7mm DOI, no LVSI, no PNI, negative margins (close <1mm margin deep), no bony invasion.     Reason for Visit:  On Radiation Treatment Visit       Treatment Summary to Date  Treatment Site: left buccal Current Dose: 5000/6600 cGy Fractions:       Chemotherapy  Chemo concurrent with radx?: No (ENT: Dr. Ballard)    Subjective:   No complaints, tolerating RT well overall. Developing oral cavity sores, good PO intake, weight relatively stable. Stable from last week.    Pain Control: Gabapentin 900, 900, 1200mg  Fluid Intake: Adequate  Nutrition: PO   Rinses: Adequate  Skin care: Aquaphor BID  Chemotherapy: no chemotherapy     Nursing ROS:   Nutrition Alteration  Diet Type: Patient's Preference  Skin  Skin Reaction: 2 - Moderate to brisk erythema, patchy moist desquamation, mostly confined to skin folds and creases, moderate edema (no desquamation)  Skin Intervention: patient reports applying Aquaphor two times daily     ENT and Mouth Exam  Mucositis - Current: 1 - Generalized erythema  Mucositis - Internal Severity: 1 - Mild, able to eat and drink  ENT/Mouth Note: patient confirms doing baking soda and salt rinses every 30 minutes, reports taste changes, patient reports nose bleeds in the morning after blowing nose, reviewed use of  Aquaphor inside nose at bedtime  Cardiovascular  Respiratory effort: 1 - Normal - without distress        Psychosocial  Mood - Anxiety: 0 - Normal  Mood - Depression: 0 - Normal  Psychosocial Note: increasing fatigue per patient  Pain Assessment  0-10 Pain Scale: 0      Objective:   /77 (BP Location: Left arm, Patient Position: Chair, Cuff Size: Adult Regular)   Pulse 62   Temp 98  F (36.7  C) (Oral)   Resp 18   Wt 82.6 kg (182 lb)   SpO2 98%   BMI 26.11 kg/m    Gen: Appears well, in no acute distress  HN: left buccal mucosa mucositis , left buccal mucosa and reconstruction site well healed,  Skin: moderate erythema, no skin breakdown}  CV/Resp: rrr, breathing comfortably on room air  Neuro: CN 2-12 grossly intact, UE/LE full strength     Labs:  CBC RESULTS: Recent Labs   Lab Test 03/21/23  0611   WBC 8.6   RBC 4.27*   HGB 13.1*   HCT 40.8   MCV 96   MCH 30.7   MCHC 32.1   RDW 12.6        ELECTROLYTES:  Recent Labs   Lab Test 03/21/23  0611      POTASSIUM 4.8   CHLORIDE 109*   SAMINA 9.0   CO2 28   BUN 21.6   CR 0.77   *       Assessment:    Tolerating radiation therapy well.  All questions and concerns addressed.      Plan:   1. Continue current therapy.    2. Continue gabapentin, rinses, nutrition, hydration, skin care      Mosaiq chart and setup information reviewed  Ports checked and MVCT/IGRT images checked    Medication Review  Med list reviewed with patient?: Yes  Med Note: patient reports currently taking Gabapentin 900 mg at breakfast, 900 mg at lunch and 1,200 mg at dinner    Educational Topic Discussed  Education Instructions: radiation therapy side effects: fatigue, skin changes and skin cares, taste changes, dry mouth and thick saliva, mouth and throat sores, pain/difficulty with swallowing    Jimmy Luther MD  Radiation Oncology   [facility]: [phone]          Again, thank you for allowing me to participate in the care of your patient.         Sincerely,        Jimmy Luther MD

## 2023-05-25 ENCOUNTER — APPOINTMENT (OUTPATIENT)
Dept: RADIATION ONCOLOGY | Facility: CLINIC | Age: 73
End: 2023-05-25
Payer: COMMERCIAL

## 2023-05-25 PROCEDURE — 77386 PR IMRT TREATMENT DELIVERY, COMPLEX: CPT | Performed by: RADIOLOGY

## 2023-05-25 PROCEDURE — 77014 PR CT GUIDE FOR PLACEMENT RADIATION THERAPY FIELDS: CPT | Mod: TC | Performed by: RADIOLOGY

## 2023-05-26 ENCOUNTER — APPOINTMENT (OUTPATIENT)
Dept: RADIATION ONCOLOGY | Facility: CLINIC | Age: 73
End: 2023-05-26
Payer: COMMERCIAL

## 2023-05-26 PROCEDURE — 77014 PR CT GUIDE FOR PLACEMENT RADIATION THERAPY FIELDS: CPT | Performed by: RADIOLOGY

## 2023-05-26 PROCEDURE — 77386 PR IMRT TREATMENT DELIVERY, COMPLEX: CPT | Performed by: RADIOLOGY

## 2023-05-30 ENCOUNTER — APPOINTMENT (OUTPATIENT)
Dept: RADIATION ONCOLOGY | Facility: CLINIC | Age: 73
End: 2023-05-30
Payer: COMMERCIAL

## 2023-05-30 PROCEDURE — 77014 PR CT GUIDE FOR PLACEMENT RADIATION THERAPY FIELDS: CPT | Performed by: SURGERY

## 2023-05-30 PROCEDURE — 77386 PR IMRT TREATMENT DELIVERY, COMPLEX: CPT | Performed by: SURGERY

## 2023-05-31 ENCOUNTER — OFFICE VISIT (OUTPATIENT)
Dept: RADIATION ONCOLOGY | Facility: CLINIC | Age: 73
End: 2023-05-31
Payer: COMMERCIAL

## 2023-05-31 ENCOUNTER — APPOINTMENT (OUTPATIENT)
Dept: RADIATION ONCOLOGY | Facility: CLINIC | Age: 73
End: 2023-05-31
Payer: COMMERCIAL

## 2023-05-31 VITALS
OXYGEN SATURATION: 98 % | RESPIRATION RATE: 18 BRPM | WEIGHT: 179.6 LBS | SYSTOLIC BLOOD PRESSURE: 107 MMHG | BODY MASS INDEX: 25.77 KG/M2 | HEART RATE: 65 BPM | TEMPERATURE: 97.9 F | DIASTOLIC BLOOD PRESSURE: 71 MMHG

## 2023-05-31 DIAGNOSIS — C06.0 SQUAMOUS CELL CARCINOMA OF ORAL MUCOSA (H): Primary | ICD-10-CM

## 2023-05-31 PROCEDURE — 77386 PR IMRT TREATMENT DELIVERY, COMPLEX: CPT | Performed by: SURGERY

## 2023-05-31 PROCEDURE — 77014 PR CT GUIDE FOR PLACEMENT RADIATION THERAPY FIELDS: CPT | Performed by: SURGERY

## 2023-05-31 PROCEDURE — 99207 PR DROP WITH A PROCEDURE: CPT | Performed by: SURGERY

## 2023-05-31 ASSESSMENT — PAIN SCALES - GENERAL: PAINLEVEL: NO PAIN (1)

## 2023-05-31 NOTE — LETTER
2023         RE: Jenaro Kerr  [address]  [facility] [address]        Dear Colleague,    Thank you for referring your patient, Jenaro Kerr, to the [facility] RADIATION [facility]COL[facility] [place]. Please see a copy of my visit note below.    [facility] PHYSICIANS  SPECIALIZING IN BREAKTHROUGHS  Radiation Oncology    On Treatment Visit Note      Jenaro Kerr      Date: May 31, 2023   MRN: [id number]   : 1950  Diagnosis: oral cavity squamous cell carcinoma        ID: Mr. 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 Kitchen) with pathology revealing 3.0cm SCC, 7mm DOI, no LVSI, no PNI, negative margins (close <1mm margin deep), no bony invasion.     Reason for Visit:  On Radiation Treatment Visit       Treatment Summary to Date  Treatment Site: left buccal Current Dose: 5800/6600 cGy Fractions:       Chemotherapy  Chemo concurrent with radx?: No (ENT: Dr. Ballard)    Subjective:   No complaints, tolerating RT well overall. Developing oral cavity sores, good PO intake, weight relatively stable. Stable from last week.       Pain Control: Gabapentin 900, 900, 1200mg  Fluid Intake: Adequate  Nutrition: PO   Rinses: Adequate  Skin care: Aquaphor BID  Chemotherapy: no chemotherapy    Nursing ROS:   Nutrition Alteration  Diet Type: Patient's Preference  Skin  Skin Reaction: 2 - Moderate to brisk erythema, patchy moist desquamation, mostly confined to skin folds and creases, moderate edema (dry desquamation of low left neck)  Skin Intervention: patient reports applying Aquaphor two times daily     ENT and Mouth Exam  Mucositis - Current: 1 - Generalized erythema  Mucositis - Internal Severity: 1 - Mild, able to eat and drink  ENT/Mouth Note: patient reports doing baking soda rinses 8 times per day - reviewed increaisng to 15 times per day, reports taste changes, reviewed use of Aquaphor  inside nose at bedtime for intermittent nose bleeds after blowing nose  Cardiovascular  Respiratory effort: 1 - Normal - without distress        Psychosocial  Mood - Anxiety: 0 - Normal  Mood - Depression: 0 - Normal  Psychosocial Note: increasing fatigue per patient  Pain Assessment  0-10 Pain Scale: 1  Pain Descriptors: Sore (tongue)  Pain Treatment: denies need for medication management - reviewed increasing use of baking soda and salt rinses      Objective:   /71 (BP Location: Left arm, Patient Position: Chair, Cuff Size: Adult Regular)   Pulse 65   Temp 97.9  F (36.6  C) (Oral)   Resp 18   Wt 81.5 kg (179 lb 9.6 oz)   SpO2 98%   BMI 25.77 kg/m    Gen: Appears well, in no acute distress  HN: left buccal mucosa mucositis , left buccal mucosa and reconstruction site well healed,  Skin: moderate erythema, focal skin breakdown   CV/Resp: rrr, breathing comfortably on room air  Neuro: CN 2-12 grossly intact, UE/LE full strength     Labs:  CBC RESULTS: Recent Labs   Lab Test 03/21/23  0611   WBC 8.6   RBC 4.27*   HGB 13.1*   HCT 40.8   MCV 96   MCH 30.7   MCHC 32.1   RDW 12.6        ELECTROLYTES:  Recent Labs   Lab Test 03/21/23  0611      POTASSIUM 4.8   CHLORIDE 109*   SAMINA 9.0   CO2 28   BUN 21.6   CR 0.77   *       Assessment:    Tolerating radiation therapy well.  All questions and concerns addressed.      Plan:   1. Continue current therapy.    2. Continue gabapentin, rinses, nutrition, hydration, skin care  3. Completes next week, follow up 1 month post RT  4. Follow up with Dr. Ballard 7/12/23      Mosaiq chart and setup information reviewed  Ports checked and MVCT/IGRT images checked    Medication Review  Med list reviewed with patient?: Yes  Med Note: patient reports currently taking Gabapentin 900 mg at breakfast, 900 mg at lunch and 1,200 mg at dinner - reviewed continuing same dose until 6/20/2023 and then reviewed starting taper - patient and wife verbalized understanding  and have written instructions    Educational Topic Discussed  Additional Instructions: follow-up with Dr. Ballard scheduled 7/12/2023  Education Instructions: radiation therapy side effects: fatigue, skin changes and skin cares, taste changes, dry mouth and thick saliva, mouth and throat sores, pain/difficulty with swallowing    Jimmy Luther MD  Radiation Oncology   [facility]: [phone]          Again, thank you for allowing me to participate in the care of your patient.        Sincerely,        Jimmy Luther MD

## 2023-05-31 NOTE — PATIENT INSTRUCTIONS
Please contact [facility] RN with questions or concerns following today's appointment: [phone].       Please feel free to leave a detailed message if your call is not answered.    If your call is not received before 3:00 PM, it may not be returned until the following business day.    If you are receiving radiation treatment and need assistance after 3:00 PM or on the weekends, please call [phone] and ask to speak to the radiation oncologist on-call.    Thank you!    Hilda WEBSTER

## 2023-05-31 NOTE — PROGRESS NOTES
[facility] PHYSICIANS  SPECIALIZING IN BREAKTHROUGHS  Radiation Oncology    On Treatment Visit Note      Jenaro Kerr      Date: May 31, 2023   MRN: [id number]   : 1950  Diagnosis: oral cavity squamous cell carcinoma        ID: Mr. 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 Kitchen) with pathology revealing 3.0cm SCC, 7mm DOI, no LVSI, no PNI, negative margins (close <1mm margin deep), no bony invasion.     Reason for Visit:  On Radiation Treatment Visit       Treatment Summary to Date  Treatment Site: left buccal Current Dose: 5800/6600 cGy Fractions:       Chemotherapy  Chemo concurrent with radx?: No (ENT: Dr. Ballard)    Subjective:   No complaints, tolerating RT well overall. Developing oral cavity sores, good PO intake, weight relatively stable. Stable from last week.       Pain Control: Gabapentin 900, 900, 1200mg  Fluid Intake: Adequate  Nutrition: PO   Rinses: Adequate  Skin care: Aquaphor BID  Chemotherapy: no chemotherapy    Nursing ROS:   Nutrition Alteration  Diet Type: Patient's Preference  Skin  Skin Reaction: 2 - Moderate to brisk erythema, patchy moist desquamation, mostly confined to skin folds and creases, moderate edema (dry desquamation of low left neck)  Skin Intervention: patient reports applying Aquaphor two times daily     ENT and Mouth Exam  Mucositis - Current: 1 - Generalized erythema  Mucositis - Internal Severity: 1 - Mild, able to eat and drink  ENT/Mouth Note: patient reports doing baking soda rinses 8 times per day - reviewed increaisng to 15 times per day, reports taste changes, reviewed use of Aquaphor inside nose at bedtime for intermittent nose bleeds after blowing nose  Cardiovascular  Respiratory effort: 1 - Normal - without distress        Psychosocial  Mood - Anxiety: 0 - Normal  Mood - Depression: 0 - Normal  Psychosocial Note: increasing fatigue per  patient  Pain Assessment  0-10 Pain Scale: 1  Pain Descriptors: Sore (tongue)  Pain Treatment: denies need for medication management - reviewed increasing use of baking soda and salt rinses      Objective:   /71 (BP Location: Left arm, Patient Position: Chair, Cuff Size: Adult Regular)   Pulse 65   Temp 97.9  F (36.6  C) (Oral)   Resp 18   Wt 81.5 kg (179 lb 9.6 oz)   SpO2 98%   BMI 25.77 kg/m    Gen: Appears well, in no acute distress  HN: left buccal mucosa mucositis , left buccal mucosa and reconstruction site well healed,  Skin: moderate erythema, focal skin breakdown   CV/Resp: rrr, breathing comfortably on room air  Neuro: CN 2-12 grossly intact, UE/LE full strength     Labs:  CBC RESULTS: Recent Labs   Lab Test 03/21/23  0611   WBC 8.6   RBC 4.27*   HGB 13.1*   HCT 40.8   MCV 96   MCH 30.7   MCHC 32.1   RDW 12.6        ELECTROLYTES:  Recent Labs   Lab Test 03/21/23  0611      POTASSIUM 4.8   CHLORIDE 109*   SAMINA 9.0   CO2 28   BUN 21.6   CR 0.77   *       Assessment:    Tolerating radiation therapy well.  All questions and concerns addressed.      Plan:   1. Continue current therapy.    2. Continue gabapentin, rinses, nutrition, hydration, skin care  3. Completes next week, follow up 1 month post RT  4. Follow up with Dr. Ballard 7/12/23      Mosaiq chart and setup information reviewed  Ports checked and MVCT/IGRT images checked    Medication Review  Med list reviewed with patient?: Yes  Med Note: patient reports currently taking Gabapentin 900 mg at breakfast, 900 mg at lunch and 1,200 mg at dinner - reviewed continuing same dose until 6/20/2023 and then reviewed starting taper - patient and wife verbalized understanding and have written instructions    Educational Topic Discussed  Additional Instructions: follow-up with Dr. Ballard scheduled 7/12/2023  Education Instructions: radiation therapy side effects: fatigue, skin changes and skin cares, taste changes, dry mouth and  thick saliva, mouth and throat sores, pain/difficulty with swallowing    Jimmy Luther MD  Radiation Oncology   [facility]: [phone]

## 2023-06-01 ENCOUNTER — APPOINTMENT (OUTPATIENT)
Dept: RADIATION ONCOLOGY | Facility: CLINIC | Age: 73
End: 2023-06-01
Payer: COMMERCIAL

## 2023-06-01 PROCEDURE — 77386 PR IMRT TREATMENT DELIVERY, COMPLEX: CPT | Performed by: SURGERY

## 2023-06-01 PROCEDURE — 77427 RADIATION TX MANAGEMENT X5: CPT | Performed by: SURGERY

## 2023-06-01 PROCEDURE — 77014 PR CT GUIDE FOR PLACEMENT RADIATION THERAPY FIELDS: CPT | Performed by: SURGERY

## 2023-06-01 PROCEDURE — 77336 RADIATION PHYSICS CONSULT: CPT | Performed by: SURGERY

## 2023-06-02 ENCOUNTER — APPOINTMENT (OUTPATIENT)
Dept: RADIATION ONCOLOGY | Facility: CLINIC | Age: 73
End: 2023-06-02
Payer: COMMERCIAL

## 2023-06-02 ENCOUNTER — OFFICE VISIT (OUTPATIENT)
Dept: RADIATION ONCOLOGY | Facility: CLINIC | Age: 73
End: 2023-06-02
Payer: COMMERCIAL

## 2023-06-02 VITALS
RESPIRATION RATE: 18 BRPM | BODY MASS INDEX: 25.77 KG/M2 | DIASTOLIC BLOOD PRESSURE: 68 MMHG | WEIGHT: 179.6 LBS | SYSTOLIC BLOOD PRESSURE: 106 MMHG | OXYGEN SATURATION: 98 % | TEMPERATURE: 98.2 F | HEART RATE: 58 BPM

## 2023-06-02 DIAGNOSIS — C06.0 SQUAMOUS CELL CARCINOMA OF ORAL MUCOSA (H): Primary | ICD-10-CM

## 2023-06-02 PROCEDURE — 77386 PR IMRT TREATMENT DELIVERY, COMPLEX: CPT | Performed by: SURGERY

## 2023-06-02 PROCEDURE — 99207 PR DROP WITH A PROCEDURE: CPT | Performed by: SURGERY

## 2023-06-02 PROCEDURE — 77014 PR CT GUIDE FOR PLACEMENT RADIATION THERAPY FIELDS: CPT | Performed by: SURGERY

## 2023-06-02 ASSESSMENT — PAIN SCALES - GENERAL: PAINLEVEL: NO PAIN (0)

## 2023-06-02 NOTE — PATIENT INSTRUCTIONS
Please contact [facility] RN with questions or concerns following today's appointment: [phone].       Please feel free to leave a detailed message if your call is not answered.    If your call is not received before 3:00 PM, it may not be returned until the following business day.    If you are receiving radiation treatment and need assistance after 3:00 PM or on the weekends, please call [phone] and ask to speak to the radiation oncologist on-call.    Thank you!    Hilda WEBSTER

## 2023-06-02 NOTE — LETTER
2023         RE: Jenaro Kerr  [address]  [facility] [address]        Dear Colleague,    Thank you for referring your patient, Jenaro Kerr, to the [facility] [place]. Please see a copy of my visit note below.    [facility] PHYSICIANS  SPECIALIZING IN BREAKTHROUGHS  Radiation Oncology    On Treatment Visit Note      Jenaro Kerr      Date: 2023   MRN: [id number]   : 1950  Diagnosis: oral cavity squamous cell carcinoma        ID: Mr. 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 Kitchen) with pathology revealing 3.0cm SCC, 7mm DOI, no LVSI, no PNI, negative margins (close <1mm margin deep), no bony invasion.     Reason for Visit:  On Radiation Treatment Visit       Treatment Summary to Date  Treatment Site: left buccal Current Dose: 6200/6600 cGy Fractions:       Chemotherapy  Chemo concurrent with radx?: No (ENT: Dr. Ballard)    Subjective:   No complaints, tolerating RT well overall. Wt stable from last week, PO intake OK.     Pain Control: Gabapentin 900, 900, 1200mg  Fluid Intake: Adequate  Nutrition: PO   Rinses: Adequate  Skin care: Aquaphor BID  Chemotherapy: no chemotherapy       Nursing ROS:   Nutrition Alteration  Diet Type: Patient's Preference  Skin  Skin Reaction: 2 - Moderate to brisk erythema, patchy moist desquamation, mostly confined to skin folds and creases, moderate edema (dry desquamation of low left neck)  Skin Intervention: patient reports applying Aquaphor two times daily, also applying Neosporin two times daily     ENT and Mouth Exam  Mucositis - Current: 1 - Generalized erythema  Mucositis - Internal Severity: 1 - Mild, able to eat and drink  ENT/Mouth Note: patient reports doing baking soda rinses 10-15 times per day, reports taste changes, reviewed use of Aquaphor inside nose at bedtime for intermittent nose bleeds  after blowing nose, increase lip soreness over the past two days - applying Aquaphor to lips  Cardiovascular  Respiratory effort: 1 - Normal - without distress        Psychosocial  Mood - Anxiety: 0 - Normal  Mood - Depression: 0 - Normal  Psychosocial Note: increasing fatigue per patient  Pain Assessment  0-10 Pain Scale: 1  Pain Descriptors: Sore (tongue)  Pain Treatment: denies need for medication management - reviewed increasing use of baking soda and salt rinses      Objective:   /68 (BP Location: Left arm, Patient Position: Chair, Cuff Size: Adult Regular)   Pulse 58   Temp 98.2  F (36.8  C) (Oral)   Resp 18   Wt 81.5 kg (179 lb 9.6 oz)   SpO2 98%   BMI 25.77 kg/m    Gen: Appears well, in no acute distress  HN: left buccal mucosa mucositis , left buccal mucosa and reconstruction site well healed,  Skin: moderate erythema, focal skin breakdown   CV/Resp: rrr, breathing comfortably on room air  Neuro: CN 2-12 grossly intact, UE/LE full strength     Labs:  CBC RESULTS: Recent Labs   Lab Test 03/21/23  0611   WBC 8.6   RBC 4.27*   HGB 13.1*   HCT 40.8   MCV 96   MCH 30.7   MCHC 32.1   RDW 12.6        ELECTROLYTES:  Recent Labs   Lab Test 03/21/23  0611      POTASSIUM 4.8   CHLORIDE 109*   SAMINA 9.0   CO2 28   BUN 21.6   CR 0.77   *       Assessment:    Tolerating radiation therapy well.  All questions and concerns addressed.      Plan:   1. Continue current therapy.    2. Continue gabapentin, rinses, nutrition, hydration, skin care  3. Completes next week, follow up 1 month post RT  4. Follow up with Dr. Ballard 7/12/23      [employer] chart and setup information reviewed  Ports checked and MVCT/IGRT images checked    Medication Review  Med list reviewed with patient?: Yes  Med Note: patient reports currently taking Gabapentin 900 mg at breakfast, 900 mg at lunch and 1,200 mg at dinner - reviewed continuing same dose until 6/20/2023 and then reviewed starting taper - patient and wife  verbalized understanding and have written instructions    Educational Topic Discussed  Additional Instructions: follow-up with Dr. Ballard scheduled 7/12/2023, follow-up with Dr. Luther in one month - scheduling will contact patient  Education Instructions: radiation therapy side effects: fatigue, skin changes and skin cares, taste changes, dry mouth and thick saliva, mouth and throat sores, pain/difficulty with swallowing    Jimmy Luther MD  Radiation Oncology   [facility]: [phone]          Again, thank you for allowing me to participate in the care of your patient.        Sincerely,        Jimmy Luther MD

## 2023-06-05 ENCOUNTER — APPOINTMENT (OUTPATIENT)
Dept: RADIATION ONCOLOGY | Facility: CLINIC | Age: 73
End: 2023-06-05
Payer: COMMERCIAL

## 2023-06-05 PROCEDURE — 77386 PR IMRT TREATMENT DELIVERY, COMPLEX: CPT | Performed by: RADIOLOGY

## 2023-06-05 PROCEDURE — 77014 PR CT GUIDE FOR PLACEMENT RADIATION THERAPY FIELDS: CPT | Performed by: RADIOLOGY

## 2023-06-05 NOTE — PROGRESS NOTES
[facility] PHYSICIANS  SPECIALIZING IN BREAKTHROUGHS  Radiation Oncology    On Treatment Visit Note      Jenaro Kerr      Date: 2023   MRN: [id number]   : 1950  Diagnosis: oral cavity squamous cell carcinoma        ID: Mr. 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 Kitchen) with pathology revealing 3.0cm SCC, 7mm DOI, no LVSI, no PNI, negative margins (close <1mm margin deep), no bony invasion.     Reason for Visit:  On Radiation Treatment Visit       Treatment Summary to Date  Treatment Site: left buccal Current Dose: 6200/6600 cGy Fractions:       Chemotherapy  Chemo concurrent with radx?: No (ENT: Dr. Ballard)    Subjective:   No complaints, tolerating RT well overall. Wt stable from last week, PO intake OK.     Pain Control: Gabapentin 900, 900, 1200mg  Fluid Intake: Adequate  Nutrition: PO   Rinses: Adequate  Skin care: Aquaphor BID  Chemotherapy: no chemotherapy       Nursing ROS:   Nutrition Alteration  Diet Type: Patient's Preference  Skin  Skin Reaction: 2 - Moderate to brisk erythema, patchy moist desquamation, mostly confined to skin folds and creases, moderate edema (dry desquamation of low left neck)  Skin Intervention: patient reports applying Aquaphor two times daily, also applying Neosporin two times daily     ENT and Mouth Exam  Mucositis - Current: 1 - Generalized erythema  Mucositis - Internal Severity: 1 - Mild, able to eat and drink  ENT/Mouth Note: patient reports doing baking soda rinses 10-15 times per day, reports taste changes, reviewed use of Aquaphor inside nose at bedtime for intermittent nose bleeds after blowing nose, increase lip soreness over the past two days - applying Aquaphor to lips  Cardiovascular  Respiratory effort: 1 - Normal - without distress        Psychosocial  Mood - Anxiety: 0 - Normal  Mood - Depression: 0 - Normal  Psychosocial Note:  increasing fatigue per patient  Pain Assessment  0-10 Pain Scale: 1  Pain Descriptors: Sore (tongue)  Pain Treatment: denies need for medication management - reviewed increasing use of baking soda and salt rinses      Objective:   /68 (BP Location: Left arm, Patient Position: Chair, Cuff Size: Adult Regular)   Pulse 58   Temp 98.2  F (36.8  C) (Oral)   Resp 18   Wt 81.5 kg (179 lb 9.6 oz)   SpO2 98%   BMI 25.77 kg/m    Gen: Appears well, in no acute distress  HN: left buccal mucosa mucositis , left buccal mucosa and reconstruction site well healed,  Skin: moderate erythema, focal skin breakdown   CV/Resp: rrr, breathing comfortably on room air  Neuro: CN 2-12 grossly intact, UE/LE full strength     Labs:  CBC RESULTS: Recent Labs   Lab Test 03/21/23  0611   WBC 8.6   RBC 4.27*   HGB 13.1*   HCT 40.8   MCV 96   MCH 30.7   MCHC 32.1   RDW 12.6        ELECTROLYTES:  Recent Labs   Lab Test 03/21/23  0611      POTASSIUM 4.8   CHLORIDE 109*   SAMINA 9.0   CO2 28   BUN 21.6   CR 0.77   *       Assessment:    Tolerating radiation therapy well.  All questions and concerns addressed.      Plan:   1. Continue current therapy.    2. Continue gabapentin, rinses, nutrition, hydration, skin care  3. Completes next week, follow up 1 month post RT  4. Follow up with Dr. Ballard 7/12/23      Mosaiq chart and setup information reviewed  Ports checked and MVCT/IGRT images checked    Medication Review  Med list reviewed with patient?: Yes  Med Note: patient reports currently taking Gabapentin 900 mg at breakfast, 900 mg at lunch and 1,200 mg at dinner - reviewed continuing same dose until 6/20/2023 and then reviewed starting taper - patient and wife verbalized understanding and have written instructions    Educational Topic Discussed  Additional Instructions: follow-up with Dr. Ballard scheduled 7/12/2023, follow-up with Dr. Luther in one month - scheduling will contact patient  Education Instructions:  radiation therapy side effects: fatigue, skin changes and skin cares, taste changes, dry mouth and thick saliva, mouth and throat sores, pain/difficulty with swallowing    Jimmy Luther MD  Radiation Oncology   [facility]: [phone]

## 2023-06-06 ENCOUNTER — DOCUMENTATION ONLY (OUTPATIENT)
Dept: RADIATION ONCOLOGY | Facility: CLINIC | Age: 73
End: 2023-06-06
Payer: COMMERCIAL

## 2023-06-06 ENCOUNTER — APPOINTMENT (OUTPATIENT)
Dept: RADIATION ONCOLOGY | Facility: CLINIC | Age: 73
End: 2023-06-06
Payer: COMMERCIAL

## 2023-06-06 DIAGNOSIS — C06.0 SQUAMOUS CELL CARCINOMA OF ORAL MUCOSA (H): Primary | ICD-10-CM

## 2023-06-06 PROCEDURE — 77386 PR IMRT TREATMENT DELIVERY, COMPLEX: CPT | Performed by: RADIOLOGY

## 2023-06-06 PROCEDURE — 99207 PR NO CHARGE LOS: CPT | Performed by: SURGERY

## 2023-06-06 PROCEDURE — 77427 RADIATION TX MANAGEMENT X5: CPT | Performed by: RADIOLOGY

## 2023-06-06 PROCEDURE — 77014 PR CT GUIDE FOR PLACEMENT RADIATION THERAPY FIELDS: CPT | Performed by: RADIOLOGY

## 2023-07-11 ENCOUNTER — VIRTUAL VISIT (OUTPATIENT)
Dept: ONCOLOGY | Facility: CLINIC | Age: 73
End: 2023-07-11
Payer: COMMERCIAL

## 2023-07-11 DIAGNOSIS — C06.0 SQUAMOUS CELL CARCINOMA OF ORAL MUCOSA (H): Primary | ICD-10-CM

## 2023-07-11 PROCEDURE — 97803 MED NUTRITION INDIV SUBSEQ: CPT | Mod: VID | Performed by: DIETITIAN, REGISTERED

## 2023-07-11 NOTE — PROGRESS NOTES
Video-Visit Details     Type of service:  Video Visit     Video Start Time (time video started): 10:55am     Video End Time (time video stopped): 11:20am    Originating Location (pt. 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  He denies any barriers to eating at this time. He does lost of taste but that has improved a little.  He has been getting up to 3100 calories/day.  He tells me that he has to take in ~2700 calories/day in order to maintain his weight with his light activity and normal ADLs.  He had been egaging in more vigorous activity but backed off on this due to weight loss.   He inquires about healthful food choices as he feels like he's getting a lot of his calories from heavy whipping cream/half n half and ice cream in his smoothies.   Diet recall:   Breakfast - Core power shake  Lunch - hot dog w/ bun  700 calorie Smoothie  Dinner: lean protein, grain, vegetables (3 course meal)  700 calorie Smoothie  Activity: Walking dog 1 mile/day  -Weight trends - home scale 174 lbs  Wt Readings from Last 10 Encounters:   06/02/23 81.5 kg (179 lb 9.6 oz)   05/31/23 81.5 kg (179 lb 9.6 oz)   05/24/23 82.6 kg (182 lb)   05/17/23 82.9 kg (182 lb 11.2 oz)   05/10/23 83.8 kg (184 lb 11.2 oz)   05/03/23 85.7 kg (189 lb)   04/26/23 85.7 kg (189 lb)   03/31/23 80.2 kg  (176 lb 14.4 oz)   03/21/23 81.5 kg (179 lb 10.8 oz)   03/01/23 86.9 kg (191 lb 8 oz)     Previous Goals:   1.  Aim for 2400kcal and 100g protein/day  2. Weight maintenance   Evaluation: Met   Previous Nutrition Diagnosis:   Predicted suboptimal nutrient intake related to cancer treatment to head/neck region   Evaluation: No change   NEW FINDINGS:   No new findings   CURRENT NUTRITION DIAGNOSIS   No nutrition diagnosis identified at this time  INTERVENTIONS   Composition of Meals and Snacks - reviewed ways to get heart healthy calories such as olive oil, nut butters, nuts, avocados etc.  Suggested using these items as replacement to saturated fasts such as half n half, ice cream etc.  Encouraged to continue striving for a calorie goal of 2700 calories and consider increasing by ~300-500 calories with added activity.  Encouraged to continue striving for protein sources at each meal and snack for healing and preservation of muscle mass.   Reviewed ways to cope with taste changes.  Suggested trying Miracle Berry supplement, tart/sour, salty foods etc.     Follow up/Monitoring:   Wilbert has RD contact information for future questions or concerns.     Nithya Jean RD, , LD  [facility]  [phone]

## 2023-07-11 NOTE — LETTER
7/11/2023         RE: Jenaro Kerr  [address]  [facility] [address]        Dear Colleague,    Thank you for referring your patient, Jenaro Kerr, to the [facility] [place]. Please see a copy of my visit note below.    Video-Visit Details     Type of service:  Video Visit     Video Start Time (time video started): 10:55am     Video End Time (time video stopped): 11:20am    Originating Location (pt. Location): Home     Distant Location (provider location):  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  He denies any barriers to eating at this time. He does lost of taste but that has improved a little.  He has been getting up to 3100 calories/day.  He tells me that he has to take in ~2700 calories/day in order to maintain his weight with his light activity and normal ADLs.  He had been egaging in more vigorous activity but backed off on this due to weight loss.   He inquires about healthful food choices as he feels like he's getting a lot of his calories from heavy whipping cream/half n half and ice cream in his smoothies.   Diet recall:   Breakfast - Core power shake  Lunch - hot dog w/ bun  700 calorie Smoothie  Dinner: lean protein, grain, vegetables (3 course meal)  700 calorie Smoothie  Activity: Walking dog 1 mile/day  -Weight trends - home scale 174 lbs  Wt Readings from Last 10  Encounters:   06/02/23 81.5 kg (179 lb 9.6 oz)   05/31/23 81.5 kg (179 lb 9.6 oz)   05/24/23 82.6 kg (182 lb)   05/17/23 82.9 kg (182 lb 11.2 oz)   05/10/23 83.8 kg (184 lb 11.2 oz)   05/03/23 85.7 kg (189 lb)   04/26/23 85.7 kg (189 lb)   03/31/23 80.2 kg (176 lb 14.4 oz)   03/21/23 81.5 kg (179 lb 10.8 oz)   03/01/23 86.9 kg (191 lb 8 oz)     Previous Goals:   1.  Aim for 2400kcal and 100g protein/day  2. Weight maintenance   Evaluation: Met   Previous Nutrition Diagnosis:   Predicted suboptimal nutrient intake related to cancer treatment to head/neck region   Evaluation: No change   NEW FINDINGS:   No new findings   CURRENT NUTRITION DIAGNOSIS   No nutrition diagnosis identified at this time  INTERVENTIONS   Composition of Meals and Snacks - reviewed ways to get heart healthy calories such as olive oil, nut butters, nuts, avocados etc.  Suggested using these items as replacement to saturated fasts such as half n half, ice cream etc.  Encouraged to continue striving for a calorie goal of 2700 calories and consider increasing by ~300-500 calories with added activity.  Encouraged to continue striving for protein sources at each meal and snack for healing and preservation of muscle mass.   Reviewed ways to cope with taste changes.  Suggested trying Miracle Berry supplement, tart/sour, salty foods etc.     Follow up/Monitoring:   Wilbert has RD contact information for future questions or concerns.     Nithay Jean RD, , LD  [facility]  [phone]              Again, thank you for allowing me to participate in the care of your patient.        Sincerely,        Nithya Jean RD

## 2023-07-12 ENCOUNTER — OFFICE VISIT (OUTPATIENT)
Dept: OTOLARYNGOLOGY | Facility: CLINIC | Age: 73
End: 2023-07-12
Payer: COMMERCIAL

## 2023-07-12 ENCOUNTER — THERAPY VISIT (OUTPATIENT)
Dept: SPEECH THERAPY | Facility: CLINIC | Age: 73
End: 2023-07-12
Payer: COMMERCIAL

## 2023-07-12 VITALS
HEIGHT: 69 IN | DIASTOLIC BLOOD PRESSURE: 66 MMHG | OXYGEN SATURATION: 98 % | HEART RATE: 58 BPM | SYSTOLIC BLOOD PRESSURE: 112 MMHG | BODY MASS INDEX: 26.66 KG/M2 | TEMPERATURE: 98.2 F | WEIGHT: 180 LBS

## 2023-07-12 DIAGNOSIS — R13.12 OROPHARYNGEAL DYSPHAGIA: ICD-10-CM

## 2023-07-12 DIAGNOSIS — C06.0 SQUAMOUS CELL CARCINOMA OF ORAL MUCOSA (H): Primary | ICD-10-CM

## 2023-07-12 DIAGNOSIS — C06.9 PRIMARY SQUAMOUS CELL CARCINOMA OF ORAL CAVITY (H): ICD-10-CM

## 2023-07-12 DIAGNOSIS — I89.0 LYMPHEDEMA: ICD-10-CM

## 2023-07-12 PROCEDURE — 92526 ORAL FUNCTION THERAPY: CPT | Mod: GN | Performed by: SPEECH-LANGUAGE PATHOLOGIST

## 2023-07-12 PROCEDURE — 99213 OFFICE O/P EST LOW 20 MIN: CPT | Performed by: OTOLARYNGOLOGY

## 2023-07-12 RX ORDER — MULTIVIT WITH MINERALS/LUTEIN
1 TABLET ORAL DAILY
COMMUNITY

## 2023-07-12 ASSESSMENT — PAIN SCALES - GENERAL: PAINLEVEL: NO PAIN (0)

## 2023-07-12 NOTE — NURSING NOTE
"Chief Complaint   Patient presents with     RECHECK     Follow up after radiation      Blood pressure 112/66, pulse 58, temperature 98.2  F (36.8  C), height 1.753 m (5' 9\"), weight 81.6 kg (180 lb), SpO2 98 %.    Venancio Marques LPN    "

## 2023-07-12 NOTE — PROGRESS NOTES
PRIOR ONCOLOGIC HISTORY: Mr. 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  The patient then completed postoperative radiation therapy with Dr. Luther in [place] on June 6, 2023.    INTERVAL HISTORY: The patient has been doing very well in his recovery.  He has been eating and drinking most things and his radiation burns were never that severe but have recovered quite well.  He notices some tightness in his chin and also some lymphedema under his chin.  He denies any odynophagia hemoptysis, or otalgia.    PHYSICAL EXAMINATION: Patient is well-appearing and in no distress summation of the oral cavity reveals the flap looks healthy reconstruct the left buccal mucosa and retromolar trigone.  The remainder of the oral cavity including the tongue, floor mouth, gingival and buccal mucosa, retromolar trigone, hard and soft palate, and posterior pharyngeal wall look normal.  Palpation of the floor mouth, tongue, tongue base all look normal as well.  He cannot tart mirror exam.  Examination of the neck reveals lymphedema in the midline his, his marginal mandibular nerve is about a 2 out of 6.  His scar in his neck has healed well and he has no lymphadenopathy.    IMPRESSION: EUNICE    PLAN:  1.  The patient will get a PET scan 6 weeks from now  2.  The patient will get lymphedema therapy  3.  The patient is visiting with Zhanna Rosario and speech therapy today    Nirmal Ballard M.D.

## 2023-07-12 NOTE — PATIENT INSTRUCTIONS
1. Please schedule PET scan in 6-8 weeks with follow-up with Dr. Ballard the same day (ok to overbook).   2. Please call the ENT clinic with any questions,concerns, new or worsening symptoms.    -Clinic number is [phone]   - Leonor's direct line (Dr. Ballard's nurse) [phone]  3. A referral has been placed for you for Lymphedema. You will receive a call from rehab schedulers to arrange your first appointment.   If you have not heard from scheduling within 3 business days, please call [phone] to arrange.

## 2023-07-12 NOTE — LETTER
7/12/2023       RE: Jenaro Kerr  [address]  [facility] [address]     Dear Colleague,    Thank you for referring your patient, Jenaro Kerr, to the [facility] EAR NOSE AND THROAT CLINIC [place] [facility]. Please see a copy of my visit note below.    PRIOR ONCOLOGIC HISTORY: Mr. 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  The patient then completed postoperative radiation therapy with Dr. Luther in [place] on June 6, 2023.    INTERVAL HISTORY: The patient has been doing very well in his recovery.  He has been eating and drinking most things and his radiation burns were never that severe but have recovered quite well.  He notices some tightness in his chin and also some lymphedema under his chin.  He denies any odynophagia hemoptysis, or otalgia.    PHYSICAL EXAMINATION: Patient is well-appearing and in no distress summation of the oral cavity reveals the flap looks healthy reconstruct the left buccal mucosa and retromolar trigone.  The remainder of the oral cavity including the tongue, floor mouth, gingival and buccal mucosa, retromolar trigone, hard and soft palate, and posterior pharyngeal wall look normal.  Palpation of the floor mouth, tongue, tongue base all look normal as well.  He cannot tart mirror exam.  Examination of the neck reveals lymphedema in the midline his, his marginal mandibular nerve is about a 2 out of 6.  His scar in his neck has healed well and he has no lymphadenopathy.    IMPRESSION: EUNICE    PLAN:  1.  The patient will get a PET scan 6 weeks from now  2.  The patient will get lymphedema therapy  3.  The patient is visiting with Zhanna Rosario and speech therapy today    Nirmal Ballard M.D.

## 2023-07-13 NOTE — PROGRESS NOTES
[facility]                                                                                   OUTPATIENT SPEECH LANGUAGE PATHOLOGY    PLAN OF TREATMENT FOR OUTPATIENT REHABILITATION   Patient's Last Name, First Name, Jenaro Casanova YOB: 1950   Provider's Name   [facility]   Medical Record No.  [id number]     Onset Date: 03/16/23 (Surgery date) Start of Care Date: 03/29/23     Medical Diagnosis:  SCC left buccal mucosa      SLP Treatment Diagnosis: Mild oropharyngeal dysphagia  Plan of Treatment  Frequency/Duration: 1x/2-3 months  / 12 months     Certification date from 06/28/23   To 09/26/23          See note for plan of treatment details and functional goals     TOÑA Boles                         I CERTIFY THE NEED FOR THESE SERVICES FURNISHED UNDER        THIS PLAN OF TREATMENT AND WHILE UNDER MY CARE     (Physician attestation of this document indicates review and certification of the therapy plan).                Referring Provider:  Nirmal Ballard      Initial Assessment  See Epic Evaluation- 03/29/23 07/12/23 5618   Appointment Info   Treating Provider Zhanna Rosario MA, CCC-SLP   Medical Diagnosis SCC left buccal mucosa   SLP Tx Diagnosis Mild oropharyngeal dysphagia   Quick Adds Certification   Session Number   Session Number 2   Progress Note/Certification   Start Of Care Date 03/29/23   Onset Of Illness/injury Or Date Of Surgery 03/16/23  (Surgery date)   Therapy Frequency 1x/2-3 months   Predicted Duration 12 months   Certification date from 06/28/23   Certification date to 09/26/23   Progress Note Due Date 09/26/23   Subjective Report   Subjective Report Wilbert Kerr is a 73-year-old man who underwent WLE of left buccal mucosa, left marginal mandibulectomy, left neck dissection 1b-4 and reconstruction with left RFFF on 3/16/23 for SCC of the left buccal mucosa. He then underwent adjuvant XRT (left  neck and oral cavity) finishing on 6/6/23. He is seen today in conjunction with ENT clinic visit accompanied by his wife. Reports doing well overall.   Treatment Swallow/Oral dysfunction   Treatment of Swallowing Dysfunction &/or Oral Function for Feeding Minutes (96503) 20 Minutes   Skilled Intervention Provided written and verbal information on diet modifications.;Educated patient on swallowing strategies.;Educated patient on risks of aspiration;Demonstrated safe swallow strategies;Assessed oral intake trials;Other   Patient Response/Progress Pt and his wife demonstrated understanding.   Education   Learner/Method Patient;Significant Other   Plan   Updates to plan of care See above.   Plan for next session f//u in conjunction with ENT clinic visit to reassess PO intake/tolerance and home exercise program/jaw ROM   Total Session Time   Total Treatment Time (sum of timed and untimed services) 20   Swallow Goal 1   Goal Identifier PO   Goal Description Pt will tolerate least restrictive diet while adhering to safe swallow precautions independently across 3 months time.   Target Date 09/26/23   Goal Progress Pt states he is eating very well. He denies any trouble swallowing specifically denying coughing/TC and feeling of stasis with PO intake. Pt reports ongoing, but improving dysgeusia. Trained pt on expected improvement in dysgeusia and the importance of continuing to try bites/sips of a variety of flavors. Pt took sips of thins today during session with no overt clinical s/sx of penetration/aspiration. He does notice improvement in his jaw stating he could take a bite into an apple and have a little corn on the cob. Trained expected improvement in side effects as he continues to recover from XRT. Trained pt to continue with regular, moist textures and thin liquids with use of general safe, swallow strategies.   Swallow Goal 2   Goal Identifier Exericses   Goal Description Pt will improve and/or maintain ROM and  "strength of oral mechanism to minimize long term impact of radiation fibrosis on the swallow musculature.   Target Date 09/26/23   Goal Progress Pt reports he did his exercises \"religiously\" during treatment 2-3x/day and continues to do them. He denies any questions on the exercises. Pt reports working hard on the jaw stretch. He can get a little over 2 fingers width today.  Trained pt on using popsicle sticks to aid in jaw stretch. He can get 21 popsicle sticks in today. He has to place them on his right side d/t lack of dentition on the bottom left. Encouraged pt to continue with this.   OTHER   SLP Swallow Goals 1;2       "

## 2023-07-21 ENCOUNTER — OFFICE VISIT (OUTPATIENT)
Dept: RADIATION ONCOLOGY | Facility: CLINIC | Age: 73
End: 2023-07-21
Payer: COMMERCIAL

## 2023-07-21 VITALS
OXYGEN SATURATION: 97 % | SYSTOLIC BLOOD PRESSURE: 102 MMHG | BODY MASS INDEX: 26.46 KG/M2 | HEART RATE: 62 BPM | WEIGHT: 179.2 LBS | RESPIRATION RATE: 18 BRPM | DIASTOLIC BLOOD PRESSURE: 70 MMHG | TEMPERATURE: 98 F

## 2023-07-21 DIAGNOSIS — C06.0 SQUAMOUS CELL CARCINOMA OF ORAL MUCOSA (H): Primary | ICD-10-CM

## 2023-07-21 PROCEDURE — 99024 POSTOP FOLLOW-UP VISIT: CPT | Performed by: SURGERY

## 2023-07-21 ASSESSMENT — PAIN SCALES - GENERAL: PAINLEVEL: NO PAIN (0)

## 2023-07-21 NOTE — NURSING NOTE
RADIATION ONCOLOGY FOLLOW-UP VISIT    Patient Name: Jenaro Kerr      : 1950     Age: 73 year old        ______________________________________________________________________________       Chief Complaint   Patient presents with     Radiation Therapy     Radiation Oncology Return visit with Dr. Jimmy Luther     /70 (BP Location: Right arm, Cuff Size: Adult Regular)   Pulse 62   Temp 98  F (36.7  C) (Oral)   Resp 18   Wt 81.3 kg (179 lb 3.2 oz)   SpO2 97%   BMI 26.46 kg/m        History of Radiation Treatment:  Site: Left Buccal    Total Dose: 6,000 cGy  Date Completed: 2023  Clinic: [facility]  Physician: Dr. Jimmy Luther    Pain:  Denies    Labs:  Other Labs: No    Imaging:  None    Fatigue:   Grade 0: No toxicity    Skin:  No Concerns  Lotions/Creams: Aquaphor once per day        Future Appointments:       Appointment Date: 2023    Appointment Date:     Appointment Date:         Additional Instructions: Follow up after visit with Dr. Ballard    Nurse face-to-face time: Level 3:  10 min face to face time    Chelsi Varma RN

## 2023-07-21 NOTE — LETTER
2023         RE: Jenaro Kerr  [address]  [facility] [address]        Dear Colleague,    Thank you for referring your patient, Jenaro Kerr, to the [facility] RADIATION ONCOLOGY [place]. Please see a copy of my visit note below.         Department of Radiation Oncology  [facility]  [facility]  [address] [address] [place] [address]  [phone]       Radiation Oncology Follow-up Visit  2023      Jenaro Kerr  MRN: [id number]   : 1950     DIAGNOSIS / ID: Mr. 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 Kitchen) with pathology revealing 3.0cm SCC, 7mm DOI, no LVSI, no PNI, negative margins (close <1mm margin deep), no bony invasion.      INTENT OF RADIOTHERAPY: Adjuvant     CONCURRENT SYSTEMIC THERAPY: No              SITE OF TREATMENT: oral cavity, left neck     DATES  OF TREATMENT: 2023- 23     TOTAL DOSE OF TREATMENT / FRACTIONS: 66Gy/33fx    INTERVAL SINCE COMPLETION OF RADIATION THERAPY: 6 weeks    SUBJECTIVE:   Overall patient is doing well, pain is well controlled he is tolerating p.o. intake, and weight is stable.  He does have some mild lymphedema in the chin, and will be seeing lymphedema.  However he denies any significant pain or discomfort, dysphagia, odynophagia, otalgia, voice changes.  He does have some taste changes as well as some dry mouth, but states this is manageable.  His energy levels also returned.    He is recently evaluated by Dr. Ballard on 2023, with no signs of recurrence.  Imaging is pending on 23.     PHYSICAL EXAM:  /70 (BP Location: Right arm, Cuff Size: Adult Regular)   Pulse 62   Temp 98  F (36.7  C) (Oral)   Resp 18   Wt 81.3 kg (179 lb 3.2 oz)   SpO2 97%   BMI 26.46 kg/m    Gen: Alert, in NAD  Eyes: PERRL, EOMI, sclera anicteric  HENT     Head:  NC/AT     Ears: No external auricular lesions     Nose/sinus: No rhinorrhea or epistaxis     Oral Cavity/Oropharynx: MMM, external examination does not reveal any sign of a local recurrence, left buccal/gingival flap is well-healed and well perfused  Neck: Supple, full ROM, no LAD  Pulm: No wheezing, stridor or respiratory distress  CV: Well-perfused, no cyanosis, no pedal edema  Abdominal: Soft, nontender, nondistended, no hepatomegaly  Back: No step-offs or pain to palpation along the thoracolumbar spine, no CVA tenderness  Rectal/: Deferred  Musculoskeletal: Normal bulk and tone   Skin: Normal color and turgor  Neurologic: A/Ox3, CN II-XII intact  Psychiatric: Appropriate mood and affect    LABS AND IMAGING:  Reviewed.    IMPRESSION:   Overall, patient is doing well from a radiation acute toxicity perspective.  There are no signs of recurrence.    PLAN:   1.  Follow-up with radiation oncology in 2 months, for 3 month visit  2. Follow up with Dr. Ballard 8/2023 after imaging       Jimmy Luther M.D.  Department of Radiation Oncology  [facility]             Again, thank you for allowing me to participate in the care of your patient.        Sincerely,        Jimmy Luther MD

## 2023-07-21 NOTE — PROGRESS NOTES
Department of Radiation Oncology  [facility]    Health: Cancer Center  [facility]  [address] [address] [place] [address] [phone]       Radiation Oncology Follow-up Visit  2023      Jenaro Kerr  MRN: [id number]   : 1950     DIAGNOSIS / ID: Mr. 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 Kitchen) with pathology revealing 3.0cm SCC, 7mm DOI, no LVSI, no PNI, negative margins (close <1mm margin deep), no bony invasion.      INTENT OF RADIOTHERAPY: Adjuvant     CONCURRENT SYSTEMIC THERAPY: No              SITE OF TREATMENT: oral cavity, left neck     DATES  OF TREATMENT: 2023- 23     TOTAL DOSE OF TREATMENT / FRACTIONS: 66Gy/33fx    INTERVAL SINCE COMPLETION OF RADIATION THERAPY: 6 weeks    SUBJECTIVE:   Overall patient is doing well, pain is well controlled he is tolerating p.o. intake, and weight is stable.  He does have some mild lymphedema in the chin, and will be seeing lymphedema.  However he denies any significant pain or discomfort, dysphagia, odynophagia, otalgia, voice changes.  He does have some taste changes as well as some dry mouth, but states this is manageable.  His energy levels also returned.    He is recently evaluated by Dr. Ballard on 2023, with no signs of recurrence.  Imaging is pending on 23.     PHYSICAL EXAM:  /70 (BP Location: Right arm, Cuff Size: Adult Regular)   Pulse 62   Temp 98  F (36.7  C) (Oral)   Resp 18   Wt 81.3 kg (179 lb 3.2 oz)   SpO2 97%   BMI 26.46 kg/m    Gen: Alert, in NAD  Eyes: PERRL, EOMI, sclera anicteric  HENT     Head: NC/AT     Ears: No external auricular lesions     Nose/sinus: No rhinorrhea or epistaxis     Oral Cavity/Oropharynx: MMM, external examination does not reveal any sign of a local recurrence, left buccal/gingival flap is well-healed and well perfused  Neck:  Supple, full ROM, no LAD  Pulm: No wheezing, stridor or respiratory distress  CV: Well-perfused, no cyanosis, no pedal edema  Abdominal: Soft, nontender, nondistended, no hepatomegaly  Back: No step-offs or pain to palpation along the thoracolumbar spine, no CVA tenderness  Rectal/: Deferred  Musculoskeletal: Normal bulk and tone   Skin: Normal color and turgor  Neurologic: A/Ox3, CN II-XII intact  Psychiatric: Appropriate mood and affect    LABS AND IMAGING:  Reviewed.    IMPRESSION:   Overall, patient is doing well from a radiation acute toxicity perspective.  There are no signs of recurrence.    PLAN:   1.  Follow-up with radiation oncology in 2 months, for 3 month visit  2. Follow up with Dr. Ballard 8/2023 after imaging       Jimmy Luther M.D.  Department of Radiation Oncology  [facility]

## 2023-07-25 ENCOUNTER — THERAPY VISIT (OUTPATIENT)
Dept: PHYSICAL THERAPY | Facility: CLINIC | Age: 73
End: 2023-07-25
Attending: OTOLARYNGOLOGY
Payer: COMMERCIAL

## 2023-07-25 DIAGNOSIS — C06.0 SQUAMOUS CELL CARCINOMA OF ORAL MUCOSA (H): ICD-10-CM

## 2023-07-25 DIAGNOSIS — I89.0 LYMPHEDEMA: ICD-10-CM

## 2023-07-25 PROCEDURE — 97140 MANUAL THERAPY 1/> REGIONS: CPT | Mod: GP | Performed by: PHYSICAL THERAPIST

## 2023-07-25 PROCEDURE — 97161 PT EVAL LOW COMPLEX 20 MIN: CPT | Mod: GP | Performed by: PHYSICAL THERAPIST

## 2023-07-25 NOTE — PROGRESS NOTES
PHYSICAL THERAPY EVALUATION  Type of Visit: Evaluation    See electronic medical record for Abuse and Falls Screening details.    Subjective       Presenting condition or subjective complaint: Fluids are collecting under my jaw Pt is a 74 yo male who presents to PT with c/o swelling in jaw secondary to oral CA which was discovered in February. Pt underwent excision and surgical repair of his oral cavity, has undergone radiation to tx as well. Pt reports some discomfort with the increased swelling under his chin.  Date of onset: 02/07/23    Relevant medical history: Cancer; Dizziness; Hearing problems; Radiation treatment; Smoking   Dates & types of surgery: Oral cancer-3/16/23, Left ear 2021, Macular pucker 2020    Prior diagnostic imaging/testing results:       Prior therapy history for the same diagnosis, illness or injury: No      Prior Level of Function  Transfers:   Ambulation:   ADL:   IADL:     Living Environment  Social support: With a significant other or spouse   Type of home: House   Stairs to enter the home: No       Ramp: No   Stairs inside the home: Yes 15 Is there a railing: Yes   Help at home: None  Equipment owned:       Employment: Not Applicable    Hobbies/Interests: Model railroading, [place]    Patient goals for therapy:      Pain assessment:      Objective       EDEMA EVALUATION  Additional history:  Body part affected by edema: throat under my chin  If cancer related, treatment: Surgery (incl. 4 lymph nodes removed) and 33 radiation treatments  If not cancer related, problems with veins or cause of swelling:    Distance able to walk: Many miles  Time able to stand: As long as necessary  Sensation problems in hands/feet: No    Edema etiology: Cancer with lymph node dissection, Radiation    FUNCTIONAL SCALES  Lower Extremity Functional Scale (score out of 80). A lower score indicates greater impairment:    Shoulder Pain and Disability Index (score out of 100).  A higher score indicates  greater impairment:      Cognitive Status Examination  Orientation: Oriented to person, place and time   Level of Consciousness: Alert  Follows Commands and Answers Questions: 100% of the time  Personal Safety and Judgement: Intact  Memory: Intact    EDEMA  Skin Condition: Intact, Some redness noted on L lateral neck within radiation field  Scar: Yes  Location: L lateral neck  Capillary Refill:   Radial Pulse:   Dorsal Pedal Pulse:   Stemmer Sign:   Ulceration:     GIRTH MEASUREMENTS: Refer to separate girth measurement flowsheet.     VOLUME UE  Right UE (mL)    Left UE (mL)    UE Volume Comparison    % Difference    VOLUME LE  Right LE (mL)    Left LE (mL)    LE Volume Comparison    % Difference    Head/Neck Volume     Trunk Volume    Genital Volume       RANGE OF MOTION:  Pt head and neck AROM is WNL, pt reporting slight tightness in skin of neck with R rotation  STRENGTH:   POSTURE:   PALPATION:   ACTIVITIES OF DAILY LIVING:   BED MOBILITY:   TRANSFERS:   GAIT/LOCOMOTION:   BALANCE:   SENSATION:   VASCULAR:   COORDINATION:   MUSCLE TONE:     Assessment & Plan   CLINICAL IMPRESSIONS  Medical Diagnosis: Lymphedema (I89.0)   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 Pt skin is intact with surgical scar present on L neck, reddened skin in radiation field. Pt skin on neck and cheek within radiation field is mobile, minimally adherent in the area of his scar, and does not elicit any pain. Pt would benefit from skilled PT interventions in order to educate in skin and self cares, self MLD to manage swelling, exercises to retain head and neck ROM, and assistance in ordering a garment for long term mgmt of his condition so he may improve his overall QOL.    Clinical Decision Making (Complexity):  Clinical Presentation: Stable/Uncomplicated  Clinical  Presentation Rationale: based on medical and personal factors listed in PT evaluation  Clinical Decision Making (Complexity): Low complexity    PLAN OF CARE  Treatment Interventions:  Interventions: Manual Therapy, Therapeutic Activity, Therapeutic Exercise    Long Term Goals     PT Goal 1  Goal Identifier: HEP  Goal Description: Pt will demo independence in performance and progression of mobility and self MLD HEP in order to manage and improve current edema levels.  Target Date: 08/22/23  PT Goal 2  Goal Identifier: Garment  Goal Description: Patient will progress to head and neck compression garment for long term management of lymphedema and will demonstrate independence with donning/doffing garments.  Target Date: 08/22/23      Frequency of Treatment: 1-2x/week  Duration of Treatment: 4 weeks    Recommended Referrals to Other Professionals:   Education Assessment:   Learner/Method: Patient    Risks and benefits of evaluation/treatment have been explained.   Patient/Family/caregiver agrees with Plan of Care.     Evaluation Time:     PT Eval, Low Complexity Minutes (51613): 15       Signing Clinician: Rubio Pennington PT      [facility]                                                                                   OUTPATIENT PHYSICAL THERAPY      PLAN OF TREATMENT FOR OUTPATIENT REHABILITATION   Patient's Last Name, First Name, Jenaro Casanova YOB: 1950   Provider's Name   [facility]   Medical Record No.  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                Referring Provider:  Nirmal Ballard MD      Initial Assessment  See Epic Evaluation- Start of Care Date: 07/25/23

## 2023-07-27 ENCOUNTER — THERAPY VISIT (OUTPATIENT)
Dept: PHYSICAL THERAPY | Facility: CLINIC | Age: 73
End: 2023-07-27
Attending: OTOLARYNGOLOGY
Payer: COMMERCIAL

## 2023-07-27 DIAGNOSIS — C06.0 SQUAMOUS CELL CARCINOMA OF ORAL MUCOSA (H): Primary | ICD-10-CM

## 2023-07-27 DIAGNOSIS — I89.0 LYMPHEDEMA: ICD-10-CM

## 2023-07-27 PROCEDURE — 97140 MANUAL THERAPY 1/> REGIONS: CPT | Mod: GP | Performed by: PHYSICAL THERAPIST

## 2023-08-01 ENCOUNTER — THERAPY VISIT (OUTPATIENT)
Dept: PHYSICAL THERAPY | Facility: CLINIC | Age: 73
End: 2023-08-01
Attending: OTOLARYNGOLOGY
Payer: COMMERCIAL

## 2023-08-01 DIAGNOSIS — I89.0 LYMPHEDEMA: ICD-10-CM

## 2023-08-01 DIAGNOSIS — C06.0 SQUAMOUS CELL CARCINOMA OF ORAL MUCOSA (H): Primary | ICD-10-CM

## 2023-08-01 PROCEDURE — 97140 MANUAL THERAPY 1/> REGIONS: CPT | Mod: GP | Performed by: PHYSICAL THERAPIST

## 2023-08-03 ENCOUNTER — THERAPY VISIT (OUTPATIENT)
Dept: PHYSICAL THERAPY | Facility: CLINIC | Age: 73
End: 2023-08-03
Attending: OTOLARYNGOLOGY
Payer: COMMERCIAL

## 2023-08-03 DIAGNOSIS — I89.0 LYMPHEDEMA: ICD-10-CM

## 2023-08-03 DIAGNOSIS — C06.0 SQUAMOUS CELL CARCINOMA OF ORAL MUCOSA (H): Primary | ICD-10-CM

## 2023-08-03 PROCEDURE — 97140 MANUAL THERAPY 1/> REGIONS: CPT | Mod: GP | Performed by: PHYSICAL THERAPIST

## 2023-08-10 ENCOUNTER — THERAPY VISIT (OUTPATIENT)
Dept: PHYSICAL THERAPY | Facility: CLINIC | Age: 73
End: 2023-08-10
Attending: OTOLARYNGOLOGY
Payer: COMMERCIAL

## 2023-08-10 DIAGNOSIS — C06.0 SQUAMOUS CELL CARCINOMA OF ORAL MUCOSA (H): Primary | ICD-10-CM

## 2023-08-10 DIAGNOSIS — I89.0 LYMPHEDEMA: ICD-10-CM

## 2023-08-10 PROCEDURE — 97140 MANUAL THERAPY 1/> REGIONS: CPT | Mod: GP | Performed by: PHYSICAL THERAPIST

## 2023-08-10 NOTE — PROGRESS NOTES
DISCHARGE  Reason for Discharge: Patient has met all goals.    Equipment Issued: Assisted in ordering head and neck garment    Discharge Plan: Patient to continue home program.    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   Goal Progress Pt is managing his HEP well, comes to all sessions with questions for clrification, education.   Target Date 08/22/23   Date Met 08/03/23   PT Goal 2   Goal Identifier Garment   Goal Description Patient will progress to head and neck compression garment for long term management of lymphedema and will demonstrate independence with donning/doffing garments.   Goal Progress Pt is adhering to consistent wearing schedule overnight, not as frequent during the day as he could.   Target Date 08/22/23   Date Met 08/10/23   Subjective Report   Subjective Report Pt reports wearing his garment overnight, occ during the day, has come up with a way to anchor top strap to prevent sliding on his head as he sleeps.   Treatment Interventions (PT)   Interventions Manual Therapy   Manual Therapy   Manual Therapy: Mobilization, MFR, MLD, friction massage minutes (98997) 30   Manual Therapy 1 Final check in, fewer  questions   Manual Therapy 1 - Details Final measurements taken of head and neck, pt showing slight improvements in measurements, bela around his neck where the majority of his swelling has been located. Pt doesn't have any questions at this time, feels confident in performance and mgmt of his HEP and garments.   Education   Learner/Method Patient;Listening;Demonstration;Pictures/Video;No Barriers to Learning   Plan   Home program MLD daily   Plan for next session D/C skilled PT   Total Session Time   Timed Code Treatment Minutes 30   Total Treatment Time (sum of timed and untimed services) 30

## 2023-08-25 DIAGNOSIS — C06.0 SQUAMOUS CELL CARCINOMA OF ORAL MUCOSA (H): Primary | ICD-10-CM

## 2023-08-30 ENCOUNTER — THERAPY VISIT (OUTPATIENT)
Dept: SPEECH THERAPY | Facility: CLINIC | Age: 73
End: 2023-08-30
Payer: COMMERCIAL

## 2023-08-30 ENCOUNTER — OFFICE VISIT (OUTPATIENT)
Dept: OTOLARYNGOLOGY | Facility: CLINIC | Age: 73
End: 2023-08-30
Payer: COMMERCIAL

## 2023-08-30 ENCOUNTER — HOSPITAL ENCOUNTER (OUTPATIENT)
Dept: PET IMAGING | Facility: CLINIC | Age: 73
Discharge: HOME | End: 2023-08-30
Attending: OTOLARYNGOLOGY | Admitting: OTOLARYNGOLOGY
Payer: COMMERCIAL

## 2023-08-30 VITALS
DIASTOLIC BLOOD PRESSURE: 67 MMHG | HEIGHT: 69 IN | WEIGHT: 178 LBS | TEMPERATURE: 98.6 F | BODY MASS INDEX: 26.36 KG/M2 | OXYGEN SATURATION: 97 % | HEART RATE: 64 BPM | SYSTOLIC BLOOD PRESSURE: 112 MMHG

## 2023-08-30 DIAGNOSIS — R13.12 OROPHARYNGEAL DYSPHAGIA: ICD-10-CM

## 2023-08-30 DIAGNOSIS — C06.9 PRIMARY SQUAMOUS CELL CARCINOMA OF ORAL CAVITY (H): ICD-10-CM

## 2023-08-30 DIAGNOSIS — C06.0 SQUAMOUS CELL CARCINOMA OF ORAL MUCOSA (H): ICD-10-CM

## 2023-08-30 DIAGNOSIS — C06.9 PRIMARY SQUAMOUS CELL CARCINOMA OF ORAL CAVITY (H): Primary | ICD-10-CM

## 2023-08-30 DIAGNOSIS — C06.0 SQUAMOUS CELL CARCINOMA OF ORAL MUCOSA (H): Primary | ICD-10-CM

## 2023-08-30 PROCEDURE — 78815 PET IMAGE W/CT SKULL-THIGH: CPT | Mod: 26 | Performed by: RADIOLOGY

## 2023-08-30 PROCEDURE — A9552 F18 FDG: HCPCS | Performed by: OTOLARYNGOLOGY

## 2023-08-30 PROCEDURE — 78815 PET IMAGE W/CT SKULL-THIGH: CPT | Mod: PS

## 2023-08-30 PROCEDURE — 99213 OFFICE O/P EST LOW 20 MIN: CPT | Performed by: OTOLARYNGOLOGY

## 2023-08-30 PROCEDURE — 92526 ORAL FUNCTION THERAPY: CPT | Mod: GN | Performed by: SPEECH-LANGUAGE PATHOLOGIST

## 2023-08-30 PROCEDURE — 343N000001 HC RX 343: Performed by: OTOLARYNGOLOGY

## 2023-08-30 RX ORDER — ATENOLOL 25 MG/1
25 TABLET ORAL DAILY
COMMUNITY

## 2023-08-30 RX ADMIN — FLUDEOXYGLUCOSE F-18 10.69 MILLICURIE: 500 INJECTION, SOLUTION INTRAVENOUS at 09:51

## 2023-08-30 ASSESSMENT — PAIN SCALES - GENERAL: PAINLEVEL: NO PAIN (0)

## 2023-08-30 NOTE — PROGRESS NOTES
PRIOR ONCOLOGIC HISTORY: Mr. 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  Dr. Clement performed an ulnar flap reconstruction.  The patient then completed postoperative radiation therapy with Dr. Luther in [place] on June 6, 2023.     INTERVAL HISTORY: The patient has been doing very well in his recovery. He has been working on jaw opening and has improved significantly. He is taking around 2700 calories per day to maintain his weight.  He denies any odynophagia hemoptysis, or otalgia.  His 3 month PET today was read as negative.  He has a stable renal cysts.       PHYSICAL EXAMINATION: Patient is well-appearing and in no distress summation of the oral cavity reveals the flap looks healthy reconstruct the left buccal mucosa and retromolar trigone.  The remainder of the oral cavity including the tongue, floor mouth, gingival and buccal mucosa, retromolar trigone, hard and soft palate, and posterior pharyngeal wall look normal.  Palpation of the floor mouth, tongue, tongue base all look normal as well.  He cannot tart mirror exam.  Examination of the neck reveals lymphedema in the midline his, his marginal mandibular nerve is about a 5 out of 6.  His scar in his neck has healed well and he has no lymphadenopathy.     IMPRESSION: EUNICE     PLAN:  1.  We will see him in 2 months for surveillance.    2.  The patient is visiting with Zhanna Rosario and speech therapy today     Nirmal Ballard M.D.

## 2023-08-30 NOTE — PATIENT INSTRUCTIONS
1. Please follow-up in clinic in 2-3 months.   2. Please call the ENT clinic with any questions,concerns, new or worsening symptoms.    -Clinic number is [phone]   - Leonor's direct line (Dr. Ballard's nurse) [phone]

## 2023-08-30 NOTE — NURSING NOTE
"Chief Complaint   Patient presents with    RECHECK     6 -8 week follow up     Blood pressure 112/67, pulse 64, temperature 98.6  F (37  C), height 1.753 m (5' 9\"), weight 80.7 kg (178 lb), SpO2 97 %.    Venancio Marques LPN    "

## 2023-08-30 NOTE — LETTER
8/30/2023       RE: Jenaro Kerr  [address]  [facility] [address]     Dear Colleague,    Thank you for referring your patient, Jenaro Kerr, to the [facility] EAR NOSE AND THROAT CLINIC [place] at [facility]. Please see a copy of my visit note below.    PRIOR ONCOLOGIC HISTORY: Mr. 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  Dr. Clement performed an ulnar flap reconstruction.  The patient then completed postoperative radiation therapy with Dr. Luther in [place] on June 6, 2023.     INTERVAL HISTORY: The patient has been doing very well in his recovery. He has been working on jaw opening and has improved significantly. He is taking around 2700 calories per day to maintain his weight.  He denies any odynophagia hemoptysis, or otalgia.  His 3 month PET today was read as negative.  He has a stable renal cysts.       PHYSICAL EXAMINATION: Patient is well-appearing and in no distress summation of the oral cavity reveals the flap looks healthy reconstruct the left buccal mucosa and retromolar trigone.  The remainder of the oral cavity including the tongue, floor mouth, gingival and buccal mucosa, retromolar trigone, hard and soft palate, and posterior pharyngeal wall look normal.  Palpation of the floor mouth, tongue, tongue base all look normal as well.  He cannot tart mirror exam.  Examination of the neck reveals lymphedema in the midline his, his marginal mandibular nerve is about a 5 out of 6.  His scar in his neck has healed well and he has no lymphadenopathy.     IMPRESSION: EUNICE     PLAN:  1.  We will see him in 2 months for surveillance.    2.  The patient is visiting with Zhanna Rosario and speech therapy today     Nirmal Ballard M.D.

## 2023-08-31 ENCOUNTER — OFFICE VISIT (OUTPATIENT)
Dept: RADIATION ONCOLOGY | Facility: CLINIC | Age: 73
End: 2023-08-31
Payer: COMMERCIAL

## 2023-08-31 ENCOUNTER — PATIENT OUTREACH (OUTPATIENT)
Dept: OTOLARYNGOLOGY | Facility: CLINIC | Age: 73
End: 2023-08-31

## 2023-08-31 VITALS
TEMPERATURE: 98.4 F | RESPIRATION RATE: 18 BRPM | HEART RATE: 58 BPM | SYSTOLIC BLOOD PRESSURE: 116 MMHG | OXYGEN SATURATION: 97 % | DIASTOLIC BLOOD PRESSURE: 79 MMHG | BODY MASS INDEX: 26.24 KG/M2 | WEIGHT: 177.7 LBS

## 2023-08-31 DIAGNOSIS — N28.9 RENAL LESION: Primary | ICD-10-CM

## 2023-08-31 DIAGNOSIS — C06.0 SQUAMOUS CELL CARCINOMA OF ORAL MUCOSA (H): Primary | ICD-10-CM

## 2023-08-31 PROCEDURE — 99024 POSTOP FOLLOW-UP VISIT: CPT | Performed by: SURGERY

## 2023-08-31 ASSESSMENT — PAIN SCALES - GENERAL: PAINLEVEL: NO PAIN (0)

## 2023-08-31 NOTE — LETTER
2023         RE: Jenaro Kerr  [address]  [facility] [address]        Dear Colleague,    Thank you for referring your patient, Jenaro Kerr, to the [facility] RADIATION ONCOLOGY [place]. Please see a copy of my visit note below.         Department of Radiation Oncology  [facility]: Cancer Center  [facility]  [address] [address] [place] [address]  [phone]       Radiation Oncology Follow-up Visit  Aug 31, 2023      Jenaro Kerr  MRN: [id number]   : 1950     DIAGNOSIS / ID: Mr. 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 Kitchen) with pathology revealing 3.0cm SCC, 7mm DOI, no LVSI, no PNI, negative margins (close <1mm margin deep), no bony invasion.      INTENT OF RADIOTHERAPY: Adjuvant     CONCURRENT SYSTEMIC THERAPY: No              SITE OF TREATMENT: oral cavity, left neck     DATES  OF TREATMENT: 2023- 23     TOTAL DOSE OF TREATMENT / FRACTIONS: 66Gy/33fx    INTERVAL SINCE COMPLETION OF RADIATION THERAPY: 3 months    SUBJECTIVE:     Overall patient is doing well, pain is well controlled he is tolerating p.o. intake, and weight is stable.  He does have some mild lymphedema in the chin, and is seeing lymphedema and is using compresses for this.  However he denies any significant pain or discomfort, dysphagia, odynophagia, otalgia, voice changes.  He states taste has returned 75%, and xerostomia has improved.   does have some taste changes as well as some dry mouth, but states this is manageable.  His energy levels also returned.    He is recently evaluated by Dr. Ballard 23, with no signs of recurrence.      PET/CT 23 does not reveal any locoregional or distant recurrence.    PHYSICAL EXAM:  /79 (BP Location: Left arm, Patient Position: Chair, Cuff Size: Adult Regular)   Pulse 58   Temp 98.4  F (36.9   C) (Oral)   Resp 18   Wt 80.6 kg (177 lb 11.2 oz)   SpO2 97%   BMI 26.24 kg/m    Gen: Alert, in NAD  Eyes: PERRL, EOMI, sclera anicteric  HENT     Head: NC/AT     Ears: No external auricular lesions     Nose/sinus: No rhinorrhea or epistaxis     Oral Cavity/Oropharynx: MMM, external examination does not reveal any sign of a local recurrence, left buccal/gingival flap is well-healed   Neck: Supple, full ROM, no LAD  Pulm: No wheezing, stridor or respiratory distress  CV: Well-perfused, no cyanosis, no pedal edema  Abdominal: Soft, nontender, nondistended, no hepatomegaly  Back: No step-offs or pain to palpation along the thoracolumbar spine, no CVA tenderness  Rectal/: Deferred  Musculoskeletal: Normal bulk and tone   Skin: Normal color and turgor  Neurologic: A/Ox3, CN II-XII intact  Psychiatric: Appropriate mood and affect    LABS AND IMAGING:  Reviewed.    IMPRESSION:   Overall, patient is doing well from a radiation acute toxicity perspective.  There are no signs of recurrence clinically. PET/CT is negative.     PLAN:   1.  Follow-up with radiation oncology in 3 months  2. Follow up with Dr. Ballard in 2 months      Jimmy Luther M.D.  Department of Radiation Oncology  [facility]         Again, thank you for allowing me to participate in the care of your patient.        Sincerely,        Jimmy Luther MD

## 2023-08-31 NOTE — NURSING NOTE
RADIATION ONCOLOGY HEAD & NECK FOLLOW-UP VISIT    Patient Name: Jenaro Kerr      : 1950     Age: 73 year old        ______________________________________________________________________________       Chief Complaint   Patient presents with    Cancer     Radiation oncology return visit with Dr. Luther     /79 (BP Location: Left arm, Patient Position: Chair, Cuff Size: Adult Regular)   Pulse 58   Temp 98.4  F (36.9  C) (Oral)   Resp 18   Wt 80.6 kg (177 lb 11.2 oz)   SpO2 97%   BMI 26.24 kg/m       Radiation History:  Site: left buccal  Total Dose: 6,600 cGy  Date Completed: 2023  Clinic: [facility]  Physician: Dr. Jimmy Luther    Pain:  Denies    Labs:  Other Labs: No    Imaging:  PET: 2023    Fatigue:   Grade 0: No toxicity    Skin:  No Concerns      Dental:   Most Recent Dental Visit: prior to start of radiation treatment - next scheduled 10/12/2023  Trays: Frequency daily    Speech/Swallowing:   Most Recent evaluation or testin2023  Swallowing Restrictions: No difficulties with swallowing    Trismus/Jaw Exercises: daily    Nutrition:  Oral Intake: patient preference - focusing on 2,700 calories per day for weight maintenance  Weight:   Wt Readings from Last 3 Encounters:   23 80.6 kg (177 lb 11.2 oz)   23 80.7 kg (178 lb)   23 81.3 kg (179 lb 3.2 oz)           Future Appointments:    Dr. Ballard + CT Appointment Date: approx. 10/30/2023    Appointment Date:     Appointment Date:          Additional Instructions: return to clinic in three months for visit with Dr. Luther.    Nurse face-to-face time: Level 5:  over 15 min face to face time.    Hilda Barron, RN BSN OCN CBCN

## 2023-08-31 NOTE — PROGRESS NOTES
Department of Radiation Oncology  [facility]    Health: Cancer Center  [facility]  [address] [place] [address] [phone]       Radiation Oncology Follow-up Visit  Aug 31, 2023      Jenaro Kerr  MRN: [id number]   : 1950     DIAGNOSIS / ID: Mr. 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 Kitchen) with pathology revealing 3.0cm SCC, 7mm DOI, no LVSI, no PNI, negative margins (close <1mm margin deep), no bony invasion.      INTENT OF RADIOTHERAPY: Adjuvant     CONCURRENT SYSTEMIC THERAPY: No              SITE OF TREATMENT: oral cavity, left neck     DATES  OF TREATMENT: 2023- 23     TOTAL DOSE OF TREATMENT / FRACTIONS: 66Gy/33fx    INTERVAL SINCE COMPLETION OF RADIATION THERAPY: 3 months    SUBJECTIVE:     Overall patient is doing well, pain is well controlled he is tolerating p.o. intake, and weight is stable.  He does have some mild lymphedema in the chin, and is seeing lymphedema and is using compresses for this.  However he denies any significant pain or discomfort, dysphagia, odynophagia, otalgia, voice changes.  He states taste has returned 75%, and xerostomia has improved.   does have some taste changes as well as some dry mouth, but states this is manageable.  His energy levels also returned.    He is recently evaluated by Dr. Ballard 23, with no signs of recurrence.      PET/CT 23 does not reveal any locoregional or distant recurrence.    PHYSICAL EXAM:  /79 (BP Location: Left arm, Patient Position: Chair, Cuff Size: Adult Regular)   Pulse 58   Temp 98.4  F (36.9  C) (Oral)   Resp 18   Wt 80.6 kg (177 lb 11.2 oz)   SpO2 97%   BMI 26.24 kg/m    Gen: Alert, in NAD  Eyes: PERRL, EOMI, sclera anicteric  HENT     Head: NC/AT     Ears: No external auricular lesions     Nose/sinus: No rhinorrhea or epistaxis     Oral  Cavity/Oropharynx: MMM, external examination does not reveal any sign of a local recurrence, left buccal/gingival flap is well-healed   Neck: Supple, full ROM, no LAD  Pulm: No wheezing, stridor or respiratory distress  CV: Well-perfused, no cyanosis, no pedal edema  Abdominal: Soft, nontender, nondistended, no hepatomegaly  Back: No step-offs or pain to palpation along the thoracolumbar spine, no CVA tenderness  Rectal/: Deferred  Musculoskeletal: Normal bulk and tone   Skin: Normal color and turgor  Neurologic: A/Ox3, CN II-XII intact  Psychiatric: Appropriate mood and affect    LABS AND IMAGING:  Reviewed.    IMPRESSION:   Overall, patient is doing well from a radiation acute toxicity perspective.  There are no signs of recurrence clinically. PET/CT is negative.     PLAN:   1.  Follow-up with radiation oncology in 3 months  2. Follow up with Dr. Ballard in 2 months      Jimmy Luther M.D.  Department of Radiation Oncology  [facility]

## 2023-08-31 NOTE — TELEPHONE ENCOUNTER
Called and spoke with patient regarding the following PET scan results:    MPRESSION: In this patient with history of left buccal squamous cell  carcinoma status post resection, radiation, and flap reconstruction.     1. No evidence of suspicious FDG activity over soft tissue lesion  within the resection bed suggest locally recurrent disease. Expected  post radiation and surgical changes of the left face/neck.     2. No evidence of cervical skylar or distant metastatic disease.     3. Unchanged nonobstructing left renal and urinary bladder calculi.     4. Unchanged 2 cm indeterminate density non-FDG avid exophytic right  cystic lesion, favoring a partially complex cyst. If further  evaluation is desired consider multiphasic renal protocol MRI.      Reviewed the need for MRI scan to further evaluate the renal cyst. Patient agreeable and will await call to schedule. Patient will follow-up with Dr. Ballard in 2-3 months. He was encouraged to call with further questions or concerns.       Leonor Husain, RN, BSN

## 2023-08-31 NOTE — PATIENT INSTRUCTIONS
Please contact [facility] RN with questions or concerns following today's appointment: [phone].       Please feel free to leave a detailed message if your call is not answered.    If your call is not received before 3:00 PM, it may not be returned until the following business day.    If you are receiving radiation treatment and need assistance after 3:00 PM or on the weekends, please call [phone] and ask to speak to the radiation oncologist on-call.    Thank you!    Hilda WEBSTER

## 2023-09-11 ENCOUNTER — TELEPHONE (OUTPATIENT)
Dept: OTOLARYNGOLOGY | Facility: CLINIC | Age: 73
End: 2023-09-11
Payer: COMMERCIAL

## 2023-09-11 NOTE — TELEPHONE ENCOUNTER
Returned call to patient with update that he will not be due for CT neck and chest with Dr. Ballard until Feb of 2024. Patient agreeable. Patient questioning if he can just continue to follow-up with his PCP regarding the renal lesion seen on his PET scan as he has already been following with PCP and they completed imaging in May and there has not been any changes in the renal lesion. Discussed with patient that he can proceed with his follow-up for the renal lesion with his PCP and we will cancel renal MRI at this time. He will have PCP order it if they wish to proceed with additional work up.     Patient will follow-up with Dr. Ballard in November as scheduled and he was encouraged to call with further questions or concerns.       Leonor Husain, RN, BSN

## 2023-09-11 NOTE — TELEPHONE ENCOUNTER
[facility] Call Center    Phone Message    May a detailed message be left on voicemail: yes     Reason for Call: Other: Pt has an MRI scheduled 2 days prior to CT that was ordered by Elio. Pt wondering if the two will interfere with each other being 2 days apart? Pt also wants to verify that the MRI is actually needed. Please call pt to discuss. Thank you.      Action Taken: Other: [facility]    Travel Screening: Not Applicable

## 2023-11-01 ENCOUNTER — OFFICE VISIT (OUTPATIENT)
Dept: OTOLARYNGOLOGY | Facility: CLINIC | Age: 73
End: 2023-11-01
Payer: COMMERCIAL

## 2023-11-01 ENCOUNTER — THERAPY VISIT (OUTPATIENT)
Dept: SPEECH THERAPY | Facility: CLINIC | Age: 73
End: 2023-11-01
Payer: COMMERCIAL

## 2023-11-01 VITALS — HEIGHT: 69 IN | BODY MASS INDEX: 26.22 KG/M2 | WEIGHT: 177 LBS | TEMPERATURE: 98.4 F | OXYGEN SATURATION: 98 %

## 2023-11-01 DIAGNOSIS — C06.9 PRIMARY SQUAMOUS CELL CARCINOMA OF ORAL CAVITY (H): ICD-10-CM

## 2023-11-01 DIAGNOSIS — C06.0 SQUAMOUS CELL CARCINOMA OF ORAL MUCOSA (H): Primary | ICD-10-CM

## 2023-11-01 DIAGNOSIS — R13.12 OROPHARYNGEAL DYSPHAGIA: ICD-10-CM

## 2023-11-01 PROCEDURE — 99213 OFFICE O/P EST LOW 20 MIN: CPT | Performed by: OTOLARYNGOLOGY

## 2023-11-01 PROCEDURE — 92526 ORAL FUNCTION THERAPY: CPT | Mod: GN | Performed by: SPEECH-LANGUAGE PATHOLOGIST

## 2023-11-01 RX ORDER — TAMSULOSIN HYDROCHLORIDE 0.4 MG/1
CAPSULE ORAL
COMMUNITY
Start: 2023-01-01

## 2023-11-01 ASSESSMENT — PAIN SCALES - GENERAL: PAINLEVEL: NO PAIN (0)

## 2023-11-01 NOTE — NURSING NOTE
"Chief Complaint   Patient presents with    RECHECK     Follow up      .Temperature 98.4  F (36.9  C), height 1.753 m (5' 9\"), weight 80.3 kg (177 lb), SpO2 98%.    Venancio Marques LPN    "

## 2023-11-01 NOTE — PROGRESS NOTES
"PRIOR ONCOLOGIC HISTORY: Mr. 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  Dr. Clement performed an ulnar flap reconstruction.  The patient then completed postoperative radiation therapy with Dr. Luther in [place] on June 6, 2023.     INTERVAL HISTORY: He has been doing well, eating well.  He denies pain and feels his saliva is adequate. He has been exercising again and feels \"great\".         PHYSICAL EXAMINATION: Patient is well-appearing and in no distress summation of the oral cavity reveals the flap looks healthy reconstruct the left buccal mucosa and retromolar trigone.  The remainder of the oral cavity including the tongue, floor mouth, gingival and buccal mucosa, retromolar trigone, hard and soft palate, and posterior pharyngeal wall look normal.  Palpation of the floor mouth, tongue, tongue base all look normal as well.  He cannot tart mirror exam.  Examination of the neck reveals lymphedema in the midline his, his marginal mandibular nerve is about a 2 out of 6.  His scar in his neck has healed well and he has no lymphadenopathy.     IMPRESSION: EUNICE     PLAN:  1.  We will see him in 2 months for surveillance.    2.  The patient is visiting with Zhanna Rosario and speech therapy today  3.  He will need a CT neck/chest in February     Nirmal Ballard M.D.  "

## 2023-11-01 NOTE — LETTER
"11/1/2023       RE: Jenaro Kerr  [address]  [facility] [address]     Dear Colleague,    Thank you for referring your patient, Jenaro Kerr, to the [facility] EAR NOSE AND THROAT CLINIC [place] at [facility]. Please see a copy of my visit note below.    PRIOR ONCOLOGIC HISTORY: Mr. 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  Dr. Clement performed an ulnar flap reconstruction.  The patient then completed postoperative radiation therapy with Dr. Luther in [place] on June 6, 2023.     INTERVAL HISTORY: He has been doing well, eating well.  He denies pain and feels his saliva is adequate. He has been exercising again and feels \"great\".         PHYSICAL EXAMINATION: Patient is well-appearing and in no distress summation of the oral cavity reveals the flap looks healthy reconstruct the left buccal mucosa and retromolar trigone.  The remainder of the oral cavity including the tongue, floor mouth, gingival and buccal mucosa, retromolar trigone, hard and soft palate, and posterior pharyngeal wall look normal.  Palpation of the floor mouth, tongue, tongue base all look normal as well.  He cannot tart mirror exam.  Examination of the neck reveals lymphedema in the midline his, his marginal mandibular nerve is about a 2 out of 6.  His scar in his neck has healed well and he has no lymphadenopathy.     IMPRESSION: EUNICE     PLAN:  1.  We will see him in 2 months for surveillance.    2.  The patient is visiting with Zhanna Rosario and speech therapy today  3.  He will need a CT neck/chest in February     Nirmal Ballard M.D.      "

## 2023-11-01 NOTE — PROGRESS NOTES
[facility]                                                                                   OUTPATIENT SPEECH LANGUAGE PATHOLOGY    PLAN OF TREATMENT FOR OUTPATIENT REHABILITATION   Patient's Last Name, First Name, Jenaro Casanova YOB: 1950   Provider's Name   [facility]   Medical Record No.  [id number]     Onset Date: 03/16/23 (Surgery date) Start of Care Date: 09/27/23     Medical Diagnosis:  SCC left buccal mucosa      SLP Treatment Diagnosis: Mild oropharyngeal dysphagia  Plan of Treatment  Frequency/Duration: 1x/2-3 months  / 6 months     Certification date from 09/27/23   To 12/25/23          See note for plan of treatment details and functional goals     TOÑA Boles                         I CERTIFY THE NEED FOR THESE SERVICES FURNISHED UNDER        THIS PLAN OF TREATMENT AND WHILE UNDER MY CARE     (Physician attestation of this document indicates review and certification of the therapy plan).                Referring Provider: Dr. Nirmal Ballard      Initial Assessment  See Epic Evaluation- 09/27/23 11/01/23 9102   Appointment Info   Treating Provider Zhanna Rosario MA, CCC-SLP   Medical Diagnosis SCC left buccal mucosa   SLP Tx Diagnosis Mild oropharyngeal dysphagia   Quick Adds Certification   Session Number   Session Number 4   Progress Note/Certification   Start Of Care Date 09/27/23   Onset Of Illness/injury Or Date Of Surgery 03/16/23  (Surgery date)   Therapy Frequency 1x/2-3 months   Predicted Duration 6 months   Certification date from 09/27/23   Certification date to 12/25/23   Progress Note Due Date 09/26/23   Progress Note Completed Date 11/01/23   Subjective Report   Subjective Report Wilbert Kerr is a 73-year-old man who underwent WLE of left buccal mucosa, left marginal mandibulectomy, left neck dissection 1b-4 and reconstruction with left RFFF on 3/16/23 for SCC of the left buccal mucosa. He  then underwent adjuvant XRT (left neck and oral cavity) finishing on 6/6/23. He is seen today in conjunction with ENT clinic visit accompanied by his wife. Reports continued improvement in jaw ROM.   Treatment Swallow/Oral dysfunction   Treatment of Swallowing Dysfunction &/or Oral Function for Feeding Minutes (69831) 20 Minutes   Skilled Intervention Provided written and verbal information on diet modifications.;Educated patient on swallowing strategies.;Educated patient on risks of aspiration;Demonstrated safe swallow strategies;Assessed oral intake trials;Other   Patient Response/Progress Pt and his wife demonstrated understanding.   Education   Learner/Method Patient;Significant Other   Plan   Updates to plan of care See above.   Plan for next session f//u in conjunction with ENT clinic visit to reassess PO intake/tolerance and home exercise program/jaw ROM; consider discharge if stable   Total Session Time   Total Treatment Time (sum of timed and untimed services) 20   Swallow Goal 1   Goal Identifier PO   Goal Description Pt will tolerate least restrictive diet while adhering to safe swallow precautions independently across 3 months time.   Target Date 12/25/23   Goal Progress Pt reports PO intake is going very well. He feels his taste is 90% recovered. He was able to taste chicken salad again and was very happy about this. He denies coughing/throat clearing and feeling of pharyngeal stasis with PO intake. He is very pleased that his swallowing and eating is feeling more normal. He does notice that sometimes he feels like he has a lisp on /s/ sounds. Pt will think about whether he wants to do anything about it. Encouraged pt to continue with regular textures/thin liquids with general safe swallow strategies.   Swallow Goal 2   Goal Identifier Exericses   Goal Description Pt will improve and/or maintain ROM and strength of oral mechanism to minimize long term impact of radiation fibrosis on the swallow  musculature.   Target Date 12/25/23   Goal Progress Patient continues swallow exercises 2 times a day with 15 reps of each exercise. His jaw ROM is improved. He is completing 15 reps 2x/day of the jaw stretch, as well using the popsicle sticks as an aid. Reviewed recommendation to continue swallow exercises for the first 2 years after radiation is recommended to complete 2-3 times a day and then 1 time a day after that for the remainder of his life as a maintenance program.   OTHER   SLP Swallow Goals 1;2

## 2023-11-10 ENCOUNTER — TELEPHONE (OUTPATIENT)
Dept: OTOLARYNGOLOGY | Facility: CLINIC | Age: 73
End: 2023-11-10
Payer: COMMERCIAL

## 2023-11-10 NOTE — TELEPHONE ENCOUNTER
Spoke with patient and scheduled appts for CT's and follow up appt. Patient is aware of time, date and location of appts.

## 2023-11-30 ENCOUNTER — OFFICE VISIT (OUTPATIENT)
Dept: RADIATION ONCOLOGY | Facility: CLINIC | Age: 73
End: 2023-11-30
Payer: COMMERCIAL

## 2023-11-30 VITALS
WEIGHT: 175.9 LBS | BODY MASS INDEX: 25.98 KG/M2 | OXYGEN SATURATION: 97 % | HEART RATE: 57 BPM | DIASTOLIC BLOOD PRESSURE: 72 MMHG | SYSTOLIC BLOOD PRESSURE: 126 MMHG | RESPIRATION RATE: 18 BRPM | TEMPERATURE: 97.9 F

## 2023-11-30 DIAGNOSIS — C06.0 SQUAMOUS CELL CARCINOMA OF ORAL MUCOSA (H): Primary | ICD-10-CM

## 2023-11-30 PROCEDURE — 99213 OFFICE O/P EST LOW 20 MIN: CPT | Performed by: SURGERY

## 2023-11-30 ASSESSMENT — PAIN SCALES - GENERAL: PAINLEVEL: NO PAIN (0)

## 2023-11-30 NOTE — LETTER
2023         RE: Jenaro Kerr  [address]  [facility] [address]        Dear Colleague,    Thank you for referring your patient, Jenaro Kerr, to the [facility] RADIATION ONCOLOGY [place]. Please see a copy of my visit note below.         Department of Radiation Oncology  [facility] Cancer Center  [facility]  [address] [address] [place] [address]  [phone]       Radiation Oncology Follow-up Visit  2023      Jenaro Kerr  MRN: [id number]   : 1950     DIAGNOSIS / ID: Mr. 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 Kitchen) with pathology revealing 3.0cm SCC, 7mm DOI, no LVSI, no PNI, negative margins (close <1mm margin deep), no bony invasion.      INTENT OF RADIOTHERAPY: Adjuvant     CONCURRENT SYSTEMIC THERAPY: No              SITE OF TREATMENT: oral cavity, left neck     DATES  OF TREATMENT: 2023- 23     TOTAL DOSE OF TREATMENT / FRACTIONS: 66Gy/33fx    INTERVAL SINCE COMPLETION OF RADIATION THERAPY: 6 months    SUBJECTIVE:     Overall, patient is doing well since completing radiation.  He denies any pain, trismus, odynophagia, dysphagia.  He states that he does not have any significant xerostomia, adenopathy.  His weight is now stable and he is tolerating both solids and liquids quite well.  Overall, he feels like he is doing well..    PET/CT 23 did not reveal any locoregional or distant recurrence.    He seen Dr. Ballard on 2023, with no evidence of any recurrence.      PHYSICAL EXAM:  /72 (BP Location: Left arm, Patient Position: Chair, Cuff Size: Adult Regular)   Pulse 57   Temp 97.9  F (36.6  C) (Oral)   Resp 18   Wt 79.8 kg (175 lb 14.4 oz)   SpO2 97%   BMI 25.98 kg/m    Gen: Alert, in NAD  Eyes: PERRL, EOMI, sclera anicteric  HENT     Head: NC/AT     Ears: No external auricular  lesions     Nose/sinus: No rhinorrhea or epistaxis     Oral Cavity/Oropharynx: MMM, external examination does not reveal any sign of a local recurrence, left buccal/gingival flap is well-healed, post radiation changes   Neck: Supple, full ROM, no LAD  Pulm: No wheezing, stridor or respiratory distress  CV: Well-perfused, no cyanosis, no pedal edema  Abdominal: Soft, nontender, nondistended, no hepatomegaly  Back: No step-offs or pain to palpation along the thoracolumbar spine, no CVA tenderness  Rectal/: Deferred  Musculoskeletal: Normal bulk and tone   Skin: Normal color and turgor  Neurologic: A/Ox3, CN II-XII intact  Psychiatric: Appropriate mood and affect    LABS AND IMAGING:  Reviewed.    IMPRESSION:   Overall, patient is doing well from a radiation acute toxicity perspective.  There are no signs of recurrence clinically. PET/CT is negative.     PLAN:   1.  Follow-up with radiation oncology in 6 months  2. Follow up with Dr. Ballard in 3-4 months with imaging February 2024      Jimmy Luther M.D.  Department of Radiation Oncology  [facility]         Again, thank you for allowing me to participate in the care of your patient.        Sincerely,        Jimmy Luther MD

## 2023-11-30 NOTE — PATIENT INSTRUCTIONS
Please contact [facility] RN with questions or concerns following today's appointment: [phone].       Please feel free to leave a detailed message if your call is not answered.    If your call is not received before 3:00 PM, it may not be returned until the following business day.    If you are receiving radiation treatment and need assistance after 3:00 PM or on the weekends, please call [phone] and ask to speak to the radiation oncologist on-call.    Thank you!    Hilda WEBSTER

## 2023-11-30 NOTE — NURSING NOTE
RADIATION ONCOLOGY HEAD & NECK FOLLOW-UP VISIT    Patient Name: Jenaro Kerr      : 1950     Age: 73 year old        ______________________________________________________________________________       Chief Complaint   Patient presents with    Cancer     Radiation oncology return visit with Dr. Luther     /72 (BP Location: Left arm, Patient Position: Chair, Cuff Size: Adult Regular)   Pulse 57   Temp 97.9  F (36.6  C) (Oral)   Resp 18   Wt 79.8 kg (175 lb 14.4 oz)   SpO2 97%   BMI 25.98 kg/m       Radiation History:  Site: left buccal  Total Dose: 6,600 cGy  Date Completed: 2023  Clinic: [facility]  Physician: Dr. Jimmy Luther    Pain:  Denies    Labs:  Other Labs: No    Imaging:  None    Fatigue:   Grade 0: No toxicity    Skin:  No Concerns      Dental:   Most Recent Dental Visit:  per patient  Trays: Frequency one time per day    Speech/Swallowing:   Most Recent evaluation or testin2023  Swallowing Restrictions: No difficulties with swallowing    Trismus/Jaw Exercises: twice daily per patient    Nutrition:  Oral Intake: patient preference  Weight:   Wt Readings from Last 3 Encounters:   23 79.8 kg (175 lb 14.4 oz)   23 80.3 kg (177 lb)   23 80.6 kg (177 lb 11.2 oz)           Future Appointments:    CT Neck + CT Chest Appointment Date: 2024   Dr. Ballard Appointment Date: 2024    Appointment Date: =         Additional Instructions: return to clinic in 6 months for visit with Dr. Luther    Nurse face-to-face time: Level 5:  over 15 min face to face time.    Hilda Barron RN BSN OCN CBCN

## 2023-12-01 NOTE — PROGRESS NOTES
Department of Radiation Oncology  [facility]: Cancer Center  [facility]  [address] [address] [place] [address] [phone]       Radiation Oncology Follow-up Visit  2023      Jenaro Kerr  MRN: [id number]   : 1950     DIAGNOSIS / ID: Mr. 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 Kitchen) with pathology revealing 3.0cm SCC, 7mm DOI, no LVSI, no PNI, negative margins (close <1mm margin deep), no bony invasion.      INTENT OF RADIOTHERAPY: Adjuvant     CONCURRENT SYSTEMIC THERAPY: No              SITE OF TREATMENT: oral cavity, left neck     DATES  OF TREATMENT: 2023- 23     TOTAL DOSE OF TREATMENT / FRACTIONS: 66Gy/33fx    INTERVAL SINCE COMPLETION OF RADIATION THERAPY: 6 months    SUBJECTIVE:     Overall, patient is doing well since completing radiation.  He denies any pain, trismus, odynophagia, dysphagia.  He states that he does not have any significant xerostomia, adenopathy.  His weight is now stable and he is tolerating both solids and liquids quite well.  Overall, he feels like he is doing well..    PET/CT 23 did not reveal any locoregional or distant recurrence.    He seen Dr. Ballard on 2023, with no evidence of any recurrence.      PHYSICAL EXAM:  /72 (BP Location: Left arm, Patient Position: Chair, Cuff Size: Adult Regular)   Pulse 57   Temp 97.9  F (36.6  C) (Oral)   Resp 18   Wt 79.8 kg (175 lb 14.4 oz)   SpO2 97%   BMI 25.98 kg/m    Gen: Alert, in NAD  Eyes: PERRL, EOMI, sclera anicteric  HENT     Head: NC/AT     Ears: No external auricular lesions     Nose/sinus: No rhinorrhea or epistaxis     Oral Cavity/Oropharynx: MMM, external examination does not reveal any sign of a local recurrence, left buccal/gingival flap is well-healed, post radiation changes   Neck: Supple, full ROM, no LAD  Pulm: No  wheezing, stridor or respiratory distress  CV: Well-perfused, no cyanosis, no pedal edema  Abdominal: Soft, nontender, nondistended, no hepatomegaly  Back: No step-offs or pain to palpation along the thoracolumbar spine, no CVA tenderness  Rectal/: Deferred  Musculoskeletal: Normal bulk and tone   Skin: Normal color and turgor  Neurologic: A/Ox3, CN II-XII intact  Psychiatric: Appropriate mood and affect    LABS AND IMAGING:  Reviewed.    IMPRESSION:   Overall, patient is doing well from a radiation acute toxicity perspective.  There are no signs of recurrence clinically. PET/CT is negative.     PLAN:   1.  Follow-up with radiation oncology in 6 months  2. Follow up with Dr. Ballard in 3-4 months with imaging February 2024      Jimmy Luther M.D.  Department of Radiation Oncology  [facility]

## 2024-02-07 ENCOUNTER — THERAPY VISIT (OUTPATIENT)
Dept: SPEECH THERAPY | Facility: CLINIC | Age: 74
End: 2024-02-07
Payer: COMMERCIAL

## 2024-02-07 ENCOUNTER — ANCILLARY PROCEDURE (OUTPATIENT)
Dept: CT IMAGING | Facility: CLINIC | Age: 74
End: 2024-02-07
Attending: OTOLARYNGOLOGY
Payer: COMMERCIAL

## 2024-02-07 ENCOUNTER — OFFICE VISIT (OUTPATIENT)
Dept: OTOLARYNGOLOGY | Facility: CLINIC | Age: 74
End: 2024-02-07
Payer: COMMERCIAL

## 2024-02-07 VITALS
DIASTOLIC BLOOD PRESSURE: 67 MMHG | HEART RATE: 63 BPM | OXYGEN SATURATION: 96 % | TEMPERATURE: 98.3 F | BODY MASS INDEX: 25.18 KG/M2 | HEIGHT: 69 IN | WEIGHT: 170 LBS | SYSTOLIC BLOOD PRESSURE: 106 MMHG

## 2024-02-07 DIAGNOSIS — C06.9 PRIMARY SQUAMOUS CELL CARCINOMA OF ORAL CAVITY (H): ICD-10-CM

## 2024-02-07 DIAGNOSIS — C06.0 SQUAMOUS CELL CARCINOMA OF ORAL MUCOSA (H): Primary | ICD-10-CM

## 2024-02-07 DIAGNOSIS — C06.0 SQUAMOUS CELL CARCINOMA OF ORAL MUCOSA (H): ICD-10-CM

## 2024-02-07 DIAGNOSIS — R13.12 OROPHARYNGEAL DYSPHAGIA: ICD-10-CM

## 2024-02-07 LAB
CREAT BLD-MCNC: 0.8 MG/DL (ref 0.7–1.3)
EGFRCR SERPLBLD CKD-EPI 2021: >60 ML/MIN/1.73M2

## 2024-02-07 PROCEDURE — 82565 ASSAY OF CREATININE: CPT | Performed by: PATHOLOGY

## 2024-02-07 PROCEDURE — 71260 CT THORAX DX C+: CPT | Performed by: RADIOLOGY

## 2024-02-07 PROCEDURE — 92526 ORAL FUNCTION THERAPY: CPT | Mod: GN | Performed by: SPEECH-LANGUAGE PATHOLOGIST

## 2024-02-07 PROCEDURE — 99213 OFFICE O/P EST LOW 20 MIN: CPT | Performed by: OTOLARYNGOLOGY

## 2024-02-07 PROCEDURE — 70491 CT SOFT TISSUE NECK W/DYE: CPT | Performed by: RADIOLOGY

## 2024-02-07 RX ORDER — IOPAMIDOL 755 MG/ML
85 INJECTION, SOLUTION INTRAVASCULAR ONCE
Status: COMPLETED | OUTPATIENT
Start: 2024-02-07 | End: 2024-02-07

## 2024-02-07 RX ADMIN — IOPAMIDOL 85 ML: 755 INJECTION, SOLUTION INTRAVASCULAR at 10:08

## 2024-02-07 ASSESSMENT — PAIN SCALES - GENERAL: PAINLEVEL: NO PAIN (0)

## 2024-02-07 NOTE — LETTER
2/7/2024       RE: Jenaro Kerr  [address]  [facility] [address]     Dear Colleague,    Thank you for referring your patient, Jenaro Kerr, to the [facility] EAR NOSE AND THROAT CLINIC [place] at [facility]. Please see a copy of my visit note below.    PRIOR ONCOLOGIC HISTORY: Mr. 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  Dr. Clement performed an ulnar flap reconstruction.  The patient then completed postoperative radiation therapy with Dr. Luther in [place] on June 6, 2023.     INTERVAL HISTORY: He has been doing well, eating well.  He denies pain and feels his saliva is adequate. He has been exercising his jaw and feels his jaw opening is very good.  He had CT scans today.  He has mild pain occasionally in his left jaw on opening, otherwise has no new mass or lump.          PHYSICAL EXAMINATION: Patient is well-appearing and in no distress summation of the oral cavity reveals the flap looks healthy reconstruct the left buccal mucosa and retromolar trigone.  The remainder of the oral cavity including the tongue, floor mouth, gingival and buccal mucosa, retromolar trigone, hard and soft palate, and posterior pharyngeal wall look normal.  Palpation of the floor mouth, tongue, tongue base all look normal as well.  He cannot tart mirror exam.  Examination of the neck reveals lymphedema in the midline his, his marginal mandibular nerve is about a 2 out of 6.  His scar in his neck has healed well and he has no lymphadenopathy.     IMPRESSION: EUNICE     PLAN:  1.  His CT chest was read as negative, there are a few nodules to watch. We will send him the neck CT reading on [facility] when it becomes available.    2.  I will see him back in 3-4 months.       Nirmal Ballard M.D.

## 2024-02-07 NOTE — PATIENT INSTRUCTIONS
"You were seen in the clinic today by Dr. Ballard. If you have any questions or concerns after your appointment, please call the clinic at [phone]. Press \"1\" for scheduling, press \"3\" for nurse advice.    2.   The following has been recommended for you based upon your appointment today:   -follow up in 3-4 months      "

## 2024-02-07 NOTE — DISCHARGE INSTRUCTIONS

## 2024-02-07 NOTE — DISCHARGE INSTRUCTIONS

## 2024-02-07 NOTE — PROGRESS NOTES
[facility]                                                                                   OUTPATIENT SPEECH LANGUAGE PATHOLOGY    PLAN OF TREATMENT FOR OUTPATIENT REHABILITATION   Patient's Last Name, First Name, Jenaro Casanova YOB: 1950   Provider's Name   [facility]   Medical Record No.  [id number]     Onset Date: 03/16/23 (Surgery date) Start of Care Date: 09/27/23     Medical Diagnosis:  SCC left buccal mucosa      SLP Treatment Diagnosis: Mild oropharyngeal dysphagia  Plan of Treatment  Frequency/Duration: 1x/2-3 months  / 6 months     Certification date from 12/26/23   To 03/24/24          See note for plan of treatment details and functional goals     TOÑA Boles                         I CERTIFY THE NEED FOR THESE SERVICES FURNISHED UNDER        THIS PLAN OF TREATMENT AND WHILE UNDER MY CARE     (Physician attestation of this document indicates review and certification of the therapy plan).              Referring Provider:  Dr. Nirmal Ballard    Initial Assessment  See Epic Evaluation- 09/27/23 02/07/24 1430   Appointment Info   Treating Provider Zhanna Rosario MA, CCC-SLP   Medical Diagnosis SCC left buccal mucosa   SLP Tx Diagnosis Mild oropharyngeal dysphagia   Quick Adds Certification   Session Number   Session Number 5   Progress Note/Certification   Start Of Care Date 09/27/23   Onset Of Illness/injury Or Date Of Surgery 03/16/23  (Surgery date)   Therapy Frequency 1x/2-3 months   Predicted Duration 6 months   Certification date from 12/26/23   Certification date to 03/24/24   Progress Note Due Date 03/24/24   Progress Note Completed Date 02/07/24   Subjective Report   Subjective Report Wilbert Kerr is a 74-year-old man who underwent WLE of left buccal mucosa, left marginal mandibulectomy, left neck dissection 1b-4 and reconstruction with left RFFF on 3/16/23 for SCC of the left buccal mucosa. He  then underwent adjuvant XRT (left neck and oral cavity) finishing on 6/6/23. He is seen today in conjunction with ENT clinic visit accompanied by his wife. Reports has questions about the exercises.   Treatment Swallow/Oral dysfunction   Treatment of Swallowing Dysfunction &/or Oral Function for Feeding Minutes (25957) 20 Minutes   Skilled Intervention Provided written and verbal information on diet modifications.;Educated patient on swallowing strategies.;Educated patient on risks of aspiration;Demonstrated safe swallow strategies;Assessed oral intake trials;Other   Patient Response/Progress Pt and his wife demonstrated understanding.   Education   Learner/Method Patient;Significant Other   Plan   Updates to plan of care See above.   Plan for next session f//u in conjunction with ENT clinic visit to reassess PO intake/tolerance and home exercise program/jaw ROM   Swallow Goal 1   Goal Identifier PO   Goal Description Pt will tolerate least restrictive diet while adhering to safe swallow precautions independently across 3 months time.   Target Date 03/24/24   Goal Progress Pt reports PO intake is going well. He is pleased with his jaw ROM and that is doesn't have an impact on PO intake. Taste was improving, but then declined when he had a cold. He denies coughing/throat clearing and feeling of stasis with PO intake. Reviewed potential s/sx of dysphagia that could occur in the future given history of radiation. Sips of thin liquids today reveal no overt clinical s/sx of penetration/aspiration.   Swallow Goal 2   Goal Identifier Exericses   Goal Description Pt will improve and/or maintain ROM and strength of oral mechanism to minimize long term impact of radiation fibrosis on the swallow musculature.   Target Date 03/24/24   Goal Progress Pt continues to complete jaw stretch 2x/day. He made a device to assist in stretching. He is experiencing some pain on the refractory motion of chewing. This is intermittent. He  "requests retraining of swallow exercises \"to make sure they are correct.\" Pt demonstrates good form. He asked questions about the Mendelsohn. All questions answered. Encouraged him to continue with exercises as prescribed.   OTHER   SLP Swallow Goals 1;2       "

## 2024-02-07 NOTE — PROGRESS NOTES
PRIOR ONCOLOGIC HISTORY: Mr. 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  Dr. Clement performed an ulnar flap reconstruction.  The patient then completed postoperative radiation therapy with Dr. Luther in [place] on June 6, 2023.     INTERVAL HISTORY: He has been doing well, eating well.  He denies pain and feels his saliva is adequate. He has been exercising his jaw and feels his jaw opening is very good.  He had CT scans today.  He has mild pain occasionally in his left jaw on opening, otherwise has no new mass or lump.          PHYSICAL EXAMINATION: Patient is well-appearing and in no distress summation of the oral cavity reveals the flap looks healthy reconstruct the left buccal mucosa and retromolar trigone.  The remainder of the oral cavity including the tongue, floor mouth, gingival and buccal mucosa, retromolar trigone, hard and soft palate, and posterior pharyngeal wall look normal.  Palpation of the floor mouth, tongue, tongue base all look normal as well.  He cannot tart mirror exam.  Examination of the neck reveals lymphedema in the midline his, his marginal mandibular nerve is about a 2 out of 6.  His scar in his neck has healed well and he has no lymphadenopathy.     IMPRESSION: EUNICE     PLAN:  1.  His CT chest was read as negative, there are a few nodules to watch. We will [facility] him [facility].    2.  I will see him back in 3-4 months.         Nirmal Ballard M.D.

## 2024-02-07 NOTE — NURSING NOTE
"Chief Complaint   Patient presents with    RECHECK     Follow up      Blood pressure 106/67, pulse 63, temperature 98.3  F (36.8  C), height 1.753 m (5' 9\"), weight 77.1 kg (170 lb), SpO2 96%.  Venancio Marques LPN    "

## 2024-02-08 ENCOUNTER — TELEPHONE (OUTPATIENT)
Dept: OTOLARYNGOLOGY | Facility: CLINIC | Age: 74
End: 2024-02-08
Payer: COMMERCIAL

## 2024-02-08 DIAGNOSIS — C06.0 SQUAMOUS CELL CARCINOMA OF ORAL MUCOSA (H): Primary | ICD-10-CM

## 2024-02-08 NOTE — TELEPHONE ENCOUNTER
[facility] Call Center    Phone Message    May a detailed message be left on voicemail: yes     Reason for Call: Pt would like to discuss the result the testing that was done and what is the plan moving forward. Please call to discuss. Thank you    Action Taken: Message routed to:  [facility] ([facility] ENT    Travel Screening: Not Applicable

## 2024-02-08 NOTE — TELEPHONE ENCOUNTER
Called patient to review results from CT neck and chest.    IMPRESSION: Small benign-appearing scattered right-sided pulmonary  nodules measuring under 3 mm. Close attention on follow-up  recommended. Simple appearing bilateral renal cysts. Degenerative  changes in the spine and both glenohumeral joints.      Plan for patient to follow up with Dr. Ballard with a CT chest in 3 months. Patient verbalized understanding of plan of care. Will call with further questions or concerns.    Analy Seay BSN, RN

## 2024-02-19 ENCOUNTER — MEDICAL CORRESPONDENCE (OUTPATIENT)
Dept: SCHEDULING | Facility: CLINIC | Age: 74
End: 2024-02-19
Payer: COMMERCIAL

## 2024-02-22 ENCOUNTER — PATIENT OUTREACH (OUTPATIENT)
Dept: OTOLARYNGOLOGY | Facility: CLINIC | Age: 74
End: 2024-02-22
Payer: COMMERCIAL

## 2024-02-22 NOTE — TELEPHONE ENCOUNTER
Called and spoke with patient regarding the following CT neck and chest results:                                                                     Impression:  Primary: NI-RADS 1} Expected post-treatment changes in the neck  without evidence of recurrent disease at the primary site.  Neck: NI-RADS 1}  No abnormal lymph node.   Findings suggestive of acute sinusitis.       IMPRESSION: Small benign-appearing scattered right-sided pulmonary  nodules measuring under 3 mm. Close attention on follow-up  recommended. Simple appearing bilateral renal cysts. Degenerative  changes in the spine and both glenohumeral joints.         Reviewed with patient that there are no concerns on CT neck and small nodules in right lung that need to continued to be monitored for change. Patient also indicates that he spoke with his PCP regarding the bilateral renal cysts and have follow-up with his PCP and scans in May. Patient will continue with plan to see Dr. Ballard in May for follow-up as well. Patient encouraged to call with further questions or concerns.       Leonor Husain, RN, BSN

## 2024-05-08 ENCOUNTER — ANCILLARY PROCEDURE (OUTPATIENT)
Dept: CT IMAGING | Facility: CLINIC | Age: 74
End: 2024-05-08
Attending: FAMILY MEDICINE
Payer: COMMERCIAL

## 2024-05-08 ENCOUNTER — THERAPY VISIT (OUTPATIENT)
Dept: SPEECH THERAPY | Facility: CLINIC | Age: 74
End: 2024-05-08
Payer: COMMERCIAL

## 2024-05-08 ENCOUNTER — OFFICE VISIT (OUTPATIENT)
Dept: OTOLARYNGOLOGY | Facility: CLINIC | Age: 74
End: 2024-05-08
Payer: COMMERCIAL

## 2024-05-08 VITALS
WEIGHT: 169 LBS | SYSTOLIC BLOOD PRESSURE: 126 MMHG | BODY MASS INDEX: 25.03 KG/M2 | OXYGEN SATURATION: 98 % | HEART RATE: 51 BPM | TEMPERATURE: 98.3 F | DIASTOLIC BLOOD PRESSURE: 75 MMHG | HEIGHT: 69 IN

## 2024-05-08 DIAGNOSIS — C06.9 PRIMARY SQUAMOUS CELL CARCINOMA OF ORAL CAVITY (H): ICD-10-CM

## 2024-05-08 DIAGNOSIS — N28.1 RENAL CYST: ICD-10-CM

## 2024-05-08 DIAGNOSIS — C06.0 SQUAMOUS CELL CARCINOMA OF ORAL MUCOSA (H): Primary | ICD-10-CM

## 2024-05-08 DIAGNOSIS — C06.0 SQUAMOUS CELL CARCINOMA OF ORAL MUCOSA (H): ICD-10-CM

## 2024-05-08 DIAGNOSIS — R13.12 OROPHARYNGEAL DYSPHAGIA: Primary | ICD-10-CM

## 2024-05-08 LAB
CREAT BLD-MCNC: 0.8 MG/DL (ref 0.7–1.3)
EGFRCR SERPLBLD CKD-EPI 2021: >60 ML/MIN/1.73M2

## 2024-05-08 PROCEDURE — 71260 CT THORAX DX C+: CPT | Mod: GC | Performed by: RADIOLOGY

## 2024-05-08 PROCEDURE — 92526 ORAL FUNCTION THERAPY: CPT | Mod: GN | Performed by: SPEECH-LANGUAGE PATHOLOGIST

## 2024-05-08 PROCEDURE — 74178 CT ABD&PLV WO CNTR FLWD CNTR: CPT | Mod: GC | Performed by: RADIOLOGY

## 2024-05-08 PROCEDURE — 82565 ASSAY OF CREATININE: CPT | Performed by: PATHOLOGY

## 2024-05-08 PROCEDURE — 99213 OFFICE O/P EST LOW 20 MIN: CPT | Performed by: OTOLARYNGOLOGY

## 2024-05-08 RX ORDER — IOPAMIDOL 755 MG/ML
87 INJECTION, SOLUTION INTRAVASCULAR ONCE
Status: COMPLETED | OUTPATIENT
Start: 2024-05-08 | End: 2024-05-08

## 2024-05-08 RX ADMIN — IOPAMIDOL 87 ML: 755 INJECTION, SOLUTION INTRAVASCULAR at 13:58

## 2024-05-08 ASSESSMENT — PAIN SCALES - GENERAL: PAINLEVEL: NO PAIN (0)

## 2024-05-08 NOTE — PROGRESS NOTES
PRIOR ONCOLOGIC HISTORY: Mr. 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  Dr. Clement performed an ulnar flap reconstruction.  The patient then completed postoperative radiation therapy with Dr. Luther in [place] on June 6, 2023.      INTERVAL HISTORY: He has been doing well, eating well.  He denies pain and feels his saliva is adequate. He has been exercising his jaw and feels his jaw opening is very good.  He had CT scan of his chest/abdomen today.  He has mild pain occasionally in his left jaw on opening, otherwise has no new mass or lump.          PHYSICAL EXAMINATION: Patient is well-appearing and in no distress summation of the oral cavity reveals the flap looks healthy reconstruct the left buccal mucosa and retromolar trigone.  The remainder of the oral cavity including the tongue, floor mouth, gingival and buccal mucosa, retromolar trigone, hard and soft palate, and posterior pharyngeal wall look normal.  Palpation of the floor mouth, tongue, tongue base all look normal as well.  He cannot tart mirror exam.  Examination of the neck reveals lymphedema in the midline his, his marginal mandibular nerve is about a 2 out of 6.  His scar in his neck has healed well and he has no lymphadenopathy.     IMPRESSION: EUNICE     PLAN:  1.    I will see him back in 3-4 months.  2.  He will get his CT chest results via [facility]

## 2024-05-08 NOTE — NURSING NOTE
"Chief Complaint   Patient presents with    RECHECK     Follow up      Blood pressure 126/75, pulse 51, temperature 98.3  F (36.8  C), height 1.753 m (5' 9\"), weight 76.7 kg (169 lb), SpO2 98%.  Venancio Marques LPN    "

## 2024-05-08 NOTE — LETTER
5/8/2024       RE: Jenaro Kerr  [address]  [place] [facility] [address]     Dear Colleague,    Thank you for referring your patient, Jenaro Kerr, to the [facility] [place] at [facility]. Please see a copy of my visit note below.    PRIOR ONCOLOGIC HISTORY: Mr. 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  Dr. Clement performed an ulnar flap reconstruction.  The patient then completed postoperative radiation therapy with Dr. Luther in [place] on June 6, 2023.      INTERVAL HISTORY: He has been doing well, eating well.  He denies pain and feels his saliva is adequate. He has been exercising his jaw and feels his jaw opening is very good.  He had CT scan of his chest/abdomen today.  He has mild pain occasionally in his left jaw on opening, otherwise has no new mass or lump.          PHYSICAL EXAMINATION: Patient is well-appearing and in no distress summation of the oral cavity reveals the flap looks healthy reconstruct the left buccal mucosa and retromolar trigone.  The remainder of the oral cavity including the tongue, floor mouth, gingival and buccal mucosa, retromolar trigone, hard and soft palate, and posterior pharyngeal wall look normal.  Palpation of the floor mouth, tongue, tongue base all look normal as well.  He cannot tart mirror exam.  Examination of the neck reveals lymphedema in the midline his, his marginal mandibular nerve is about a 2 out of 6.  His scar in his neck has healed well and he has no lymphadenopathy.     IMPRESSION: EUNICE     PLAN:  1.    I will see him back in 3-4 months.  2.  He will get his CT chest results via [employer]      Again, thank you for allowing me to participate in the care of your patient.      Sincerely,    Nirmal TURNER  MD Elio

## 2024-05-08 NOTE — DISCHARGE INSTRUCTIONS

## 2024-05-09 ENCOUNTER — TELEPHONE (OUTPATIENT)
Dept: OTOLARYNGOLOGY | Facility: CLINIC | Age: 74
End: 2024-05-09
Payer: COMMERCIAL

## 2024-05-09 NOTE — TELEPHONE ENCOUNTER
Left Voicemail (1st Attempt) for the patient to call back and schedule the following:    Appointment type: Return ENT   Provider: Tomasa Avina  Return date: 3-4 months   Specialty phone number: [phone]  Additional appointment(s) needed:   Additonal Notes:

## 2024-05-09 NOTE — PATIENT INSTRUCTIONS
1. Please follow-up in clinic in 3-4 months  2. Please call the ENT clinic with any questions,concerns, new or worsening symptoms.    -Clinic number is [phone]   - [facility] direct line (Dr. Ballard's nurse) [phone]

## 2024-05-09 NOTE — PROGRESS NOTES
[facility]                                                                                   OUTPATIENT SPEECH LANGUAGE PATHOLOGY    PLAN OF TREATMENT FOR OUTPATIENT REHABILITATION   Patient's Last Name, First Name, Jenaro Casanova YOB: 1950   Provider's Name   [facility]   Medical Record No.  [id number]     Onset Date: 03/16/23 (Surgery date) Start of Care Date: 09/27/23     Medical Diagnosis:  SCC left buccal mucosa      SLP Treatment Diagnosis: Mild oropharyngeal dysphagia  Plan of Treatment  Frequency/Duration: 1x/2-3 months  / 6 months     Certification date from 03/25/24   To 06/22/24          See note for plan of treatment details and functional goals     TOÑA Boles                         I CERTIFY THE NEED FOR THESE SERVICES FURNISHED UNDER        THIS PLAN OF TREATMENT AND WHILE UNDER MY CARE     (Physician attestation of this document indicates review and certification of the therapy plan).              Referring Provider:  Nirmal Ballard    Initial Assessment  See Epic Evaluation- 09/27/23 05/08/24 1500   Appointment Info   Treating Provider Zhanna Rosario MA, CCC-SLP   Medical Diagnosis SCC left buccal mucosa   SLP Tx Diagnosis Mild oropharyngeal dysphagia   Quick Adds Certification   Session Number   Session Number 6   Progress Note/Certification   Start Of Care Date 09/27/23   Onset Of Illness/injury Or Date Of Surgery 03/16/23  (Surgery date)   Therapy Frequency 1x/2-3 months   Predicted Duration 6 months   Certification date from 03/25/24   Certification date to 06/22/24   Subjective Report   Subjective Report Wilbert Kerr is a 74-year-old man who underwent WLE of left buccal mucosa, left marginal mandibulectomy, left neck dissection 1b-4 and reconstruction with left RFFF on 3/16/23 for SCC of the left buccal mucosa. He then underwent adjuvant XRT (left neck and oral cavity) finishing on  "6/6/23. He is seen today in conjunction with ENT clinic visit accompanied by his wife.   Treatment Swallow/Oral dysfunction   Treatment of Swallowing Dysfunction &/or Oral Function for Feeding Minutes (25826) 22 Minutes   Skilled Intervention Provided written and verbal information on diet modifications.;Educated patient on swallowing strategies.;Educated patient on risks of aspiration;Demonstrated safe swallow strategies;Assessed oral intake trials;Other   Patient Response/Progress Pt and his wife demonstrated understanding.   Education   Learner/Method Patient;Significant Other   Plan   Updates to plan of care See above.   Plan for next session f//u in conjunction with ENT clinic visit to reassess PO intake/tolerance and home exercise program/jaw ROM   Total Session Time   Total Treatment Time (sum of timed and untimed services) 22   Swallow Goal 1   Goal Identifier PO   Goal Description Pt will tolerate least restrictive diet while adhering to safe swallow precautions independently across 3 months time.   Target Date 06/22/24   Goal Progress Patient reports p.o. intake is going very well.  He continues to report being able to eat pretty much everything he wants to.  He was recently at the head and neck survivorship conference and reports found this to be motivating him to continue working.  He states that when they started talking about the \"throat stretcher\" he found this motivating to continue working on exercises and pushing p.o. intake.  He denies coughing and throat clearing and feeling of stasis with p.o. intake.   Swallow Goal 2   Goal Identifier Exericses   Goal Description Pt will improve and/or maintain ROM and strength of oral mechanism to minimize long term impact of radiation fibrosis on the swallow musculature.   Target Date 06/22/24   Goal Progress Patient continues to work very diligently on exercises.  His job range of motion is now exactly 3 fingers.  He inquires whether it is harmful to try to " stretch beyond this.  Trained patient as long as he is stretching and gentle manner and is not noticing significant pain or discomfort he can continue to stretch to see if his jaw range of motion can improve even further.  Congratulated him on his due diligence and hard work on the exercises as he is reaping the benefits.  We discussed risk of radiation-induced fibrosis which can lead to progressive dysphagia and the importance of continuing exercises for the first 2 years after radiation.   OTHER   SLP Swallow Goals 1;2

## 2024-05-30 ENCOUNTER — OFFICE VISIT (OUTPATIENT)
Dept: RADIATION ONCOLOGY | Facility: CLINIC | Age: 74
End: 2024-05-30
Payer: COMMERCIAL

## 2024-05-30 VITALS
TEMPERATURE: 98.2 F | HEART RATE: 54 BPM | DIASTOLIC BLOOD PRESSURE: 67 MMHG | OXYGEN SATURATION: 97 % | WEIGHT: 168.1 LBS | SYSTOLIC BLOOD PRESSURE: 117 MMHG | RESPIRATION RATE: 18 BRPM | BODY MASS INDEX: 24.82 KG/M2

## 2024-05-30 DIAGNOSIS — C06.0 SQUAMOUS CELL CARCINOMA OF ORAL MUCOSA (H): Primary | ICD-10-CM

## 2024-05-30 PROCEDURE — 99214 OFFICE O/P EST MOD 30 MIN: CPT | Performed by: SURGERY

## 2024-05-30 PROCEDURE — G2211 COMPLEX E/M VISIT ADD ON: HCPCS | Performed by: SURGERY

## 2024-05-30 ASSESSMENT — PAIN SCALES - GENERAL: PAINLEVEL: NO PAIN (0)

## 2024-05-30 NOTE — LETTER
2024         RE: Jenaro Kerr  [address]  [place] [facility] [address]        Dear Colleague,    Thank you for referring your patient, Jenaro Kerr, to the [facility] RADIATION ONCOLOGY [place]. Please see a copy of my visit note below.         Department of Radiation Oncology  [facility] Center  [facility]  [address] [address] [place] [address]  [phone]       Radiation Oncology Follow-up Visit  May 30, 2024      Jenaro Kerr  MRN: [id number]   : 1950     DIAGNOSIS / ID: Mr. 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 Kitchen) with pathology revealing 3.0cm SCC, 7mm DOI, no LVSI, no PNI, negative margins (close <1mm margin deep), no bony invasion.      INTENT OF RADIOTHERAPY: Adjuvant     CONCURRENT SYSTEMIC THERAPY: No              SITE OF TREATMENT: oral cavity, left neck     DATES  OF TREATMENT: 2023- 23     TOTAL DOSE OF TREATMENT / FRACTIONS: 66Gy/33fx    INTERVAL SINCE COMPLETION OF RADIATION THERAPY: 12 months    SUBJECTIVE:     Patient is doing well since completing radiation.  He denies any significant pain, trismus dysphagia, odynophagia.  He is tolerating solid and liquid foods, with out significant xerostomia, weight is stable.  He is fairly stable from last visit.  Taste is also returning.  He is still performing his SLP exercises, he has been evaluated by dentist recently.    PET/CT 23 did not reveal any locoregional or distant recurrence.    CT scan of the neck and chest was performed on 2024 which did not demonstrate any evidence of local regional or distant metastasis.  There was a mention of small pulmonary nodules, with recommendation for close follow-up.    CT scan on 2024 did not demonstrate any regional or distant disease.    He has seen Dr. Ballard on 2024 with no evidence of  disease.    He seen Dr. Ballard on 11/1/2023, with no evidence of any recurrence.    PHYSICAL EXAM:  /67 (BP Location: Left arm, Patient Position: Chair, Cuff Size: Adult Regular)   Pulse 54   Temp 98.2  F (36.8  C) (Oral)   Resp 18   Wt 76.2 kg (168 lb 1.6 oz)   SpO2 97%   BMI 24.82 kg/m    Gen: Alert, in NAD  Eyes: PERRL, EOMI, sclera anicteric  HENT     Head: NC/AT     Ears: No external auricular lesions     Nose/sinus: No rhinorrhea or epistaxis     Oral Cavity/Oropharynx: MMM, external examination does not reveal any sign of a local recurrence, left buccal/gingival flap is well-healed, post radiation changes   Neck: Supple, full ROM, no LAD  Pulm: No wheezing, stridor or respiratory distress  CV: Well-perfused, no cyanosis, no pedal edema  Abdominal: Soft, nontender, nondistended, no hepatomegaly  Back: No step-offs or pain to palpation along the thoracolumbar spine, no CVA tenderness  Rectal/: Deferred  Musculoskeletal: Normal bulk and tone   Skin: Normal color and turgor  Neurologic: A/Ox3, CN II-XII intact  Psychiatric: Appropriate mood and affect    LABS AND IMAGING:  Per HPI, reviewed and in agreement    I reviewed 's notes    IMPRESSION:   Overall, patient is doing well from a radiation acute toxicity perspective.  There are no signs of recurrence clinically or radiographically, patient is EUNICE.     PLAN:   1.  Follow-up with radiation oncology in 6 months  2. Follow up with Dr. Ballard in 3-4 months       Jimmy Luther M.D.  Department of Radiation Oncology  [facility]     A total of 30 minutes were spent on this visit, including preparation for this visit, face to face with patient, and medical decision making. Medical decision-making included consideration and possible diagnosis, management options, complex record review, review of diagnostic tests, consideration and discussion of significant complications based up medical history/comorbidities, and discussion  with providers involved in care of the patient.         Again, thank you for allowing me to participate in the care of your patient.        Sincerely,        Jimmy Luther MD

## 2024-05-30 NOTE — PATIENT INSTRUCTIONS
Please contact [facility] RN with questions or concerns following today's appointment: [phone].       Please feel free to leave a detailed message if your call is not answered.    If your call is not received before 3:00 PM, it may not be returned until the following business day.    If you are receiving radiation treatment and need assistance after 3:00 PM or on the weekends, please call [phone] and ask to speak to the radiation oncologist on-call.    Thank you!    Hilda WEBSTER

## 2024-05-30 NOTE — NURSING NOTE
"Oncology Rooming Note    May 30, 2024 12:06 PM   Jenaro Kerr is a 74 year old male who presents for:    Chief Complaint   Patient presents with    Cancer     Radiation oncology return visit with Dr. Luther     Initial Vitals: /67 (BP Location: Left arm, Patient Position: Chair, Cuff Size: Adult Regular)   Pulse 54   Temp 98.2  F (36.8  C) (Oral)   Resp 18   Wt 76.2 kg (168 lb 1.6 oz)   SpO2 97%   BMI 24.82 kg/m   Estimated body mass index is 24.82 kg/m  as calculated from the following:    Height as of 5/8/24: 1.753 m (5' 9\").    Weight as of this encounter: 76.2 kg (168 lb 1.6 oz). Body surface area is 1.93 meters squared.  No Pain (0) Comment: Data Unavailable   No LMP for male patient.  Allergies reviewed: Yes  Medications reviewed: Yes    Medications: Medication refills not needed today.  Pharmacy name entered into [employer]: [address]    Frailty Screening:   Is the patient here for a new oncology consult visit in cancer care? 2. No      Clinical concerns:       Radiation History:  Site: left buccal  Total Dose: 6,600 cGy  Date Completed: 6/6/2023  Clinic: [facility]  Physician: Dr. Jimmy Luther      Most recent visit with [facility] was 4/14/2024, follows every six months.  Patient reports continues daily swallowing exercises and daily fluoride trays.        Dr. Jimmy Luther  was notified.    Hilda Barron, RN BSN OCN CBCN                "

## 2024-05-31 NOTE — PROGRESS NOTES
Department of Radiation Oncology  [facility]  [facility]  [address] [address] [place] [address] [phone]       Radiation Oncology Follow-up Visit  May 30, 2024      Jenaro Kerr  MRN: [id number]   : 1950     DIAGNOSIS / ID: Mr. 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 Kitchen) with pathology revealing 3.0cm SCC, 7mm DOI, no LVSI, no PNI, negative margins (close <1mm margin deep), no bony invasion.      INTENT OF RADIOTHERAPY: Adjuvant     CONCURRENT SYSTEMIC THERAPY: No              SITE OF TREATMENT: oral cavity, left neck     DATES  OF TREATMENT: 2023- 23     TOTAL DOSE OF TREATMENT / FRACTIONS: 66Gy/33fx    INTERVAL SINCE COMPLETION OF RADIATION THERAPY: 12 months    SUBJECTIVE:     Patient is doing well since completing radiation.  He denies any significant pain, trismus dysphagia, odynophagia.  He is tolerating solid and liquid foods, with out significant xerostomia, weight is stable.  He is fairly stable from last visit.  Taste is also returning.  He is still performing his SLP exercises, he has been evaluated by dentist recently.    PET/CT 23 did not reveal any locoregional or distant recurrence.    CT scan of the neck and chest was performed on 2024 which did not demonstrate any evidence of local regional or distant metastasis.  There was a mention of small pulmonary nodules, with recommendation for close follow-up.    CT scan on 2024 did not demonstrate any regional or distant disease.    He has seen Dr. Ballard on 2024 with no evidence of disease.    He seen Dr. Ballard on 2023, with no evidence of any recurrence.    PHYSICAL EXAM:  /67 (BP Location: Left arm, Patient Position: Chair, Cuff Size: Adult Regular)   Pulse 54   Temp 98.2  F (36.8  C) (Oral)   Resp 18   Wt 76.2 kg  (168 lb 1.6 oz)   SpO2 97%   BMI 24.82 kg/m    Gen: Alert, in NAD  Eyes: PERRL, EOMI, sclera anicteric  HENT     Head: NC/AT     Ears: No external auricular lesions     Nose/sinus: No rhinorrhea or epistaxis     Oral Cavity/Oropharynx: MMM, external examination does not reveal any sign of a local recurrence, left buccal/gingival flap is well-healed, post radiation changes   Neck: Supple, full ROM, no LAD  Pulm: No wheezing, stridor or respiratory distress  CV: Well-perfused, no cyanosis, no pedal edema  Abdominal: Soft, nontender, nondistended, no hepatomegaly  Back: No step-offs or pain to palpation along the thoracolumbar spine, no CVA tenderness  Rectal/: Deferred  Musculoskeletal: Normal bulk and tone   Skin: Normal color and turgor  Neurologic: A/Ox3, CN II-XII intact  Psychiatric: Appropriate mood and affect    LABS AND IMAGING:  Per HPI, reviewed and in agreement    I reviewed 's notes    IMPRESSION:   Overall, patient is doing well from a radiation acute toxicity perspective.  There are no signs of recurrence clinically or radiographically, patient is EUNICE.     PLAN:   1.  Follow-up with radiation oncology in 6 months  2. Follow up with Dr. Ballard in 3-4 months       Jimmy Luther M.D.  Department of Radiation Oncology  [facility]     A total of 30 minutes were spent on this visit, including preparation for this visit, face to face with patient, and medical decision making. Medical decision-making included consideration and possible diagnosis, management options, complex record review, review of diagnostic tests, consideration and discussion of significant complications based up medical history/comorbidities, and discussion with providers involved in care of the patient.

## 2024-08-20 ENCOUNTER — THERAPY VISIT (OUTPATIENT)
Dept: SPEECH THERAPY | Facility: CLINIC | Age: 74
End: 2024-08-20
Payer: COMMERCIAL

## 2024-08-20 ENCOUNTER — OFFICE VISIT (OUTPATIENT)
Dept: OTOLARYNGOLOGY | Facility: CLINIC | Age: 74
End: 2024-08-20
Payer: COMMERCIAL

## 2024-08-20 VITALS
HEIGHT: 69 IN | SYSTOLIC BLOOD PRESSURE: 133 MMHG | OXYGEN SATURATION: 99 % | BODY MASS INDEX: 25.22 KG/M2 | HEART RATE: 54 BPM | WEIGHT: 170.3 LBS | DIASTOLIC BLOOD PRESSURE: 80 MMHG

## 2024-08-20 DIAGNOSIS — C06.9 PRIMARY SQUAMOUS CELL CARCINOMA OF ORAL CAVITY (H): ICD-10-CM

## 2024-08-20 DIAGNOSIS — C06.0 SQUAMOUS CELL CARCINOMA OF ORAL MUCOSA (H): Primary | ICD-10-CM

## 2024-08-20 DIAGNOSIS — R13.12 OROPHARYNGEAL DYSPHAGIA: ICD-10-CM

## 2024-08-20 PROCEDURE — 92526 ORAL FUNCTION THERAPY: CPT | Mod: GN | Performed by: SPEECH-LANGUAGE PATHOLOGIST

## 2024-08-20 PROCEDURE — 99203 OFFICE O/P NEW LOW 30 MIN: CPT | Performed by: REGISTERED NURSE

## 2024-08-20 ASSESSMENT — PAIN SCALES - GENERAL: PAINLEVEL: NO PAIN (0)

## 2024-08-20 NOTE — LETTER
8/20/2024       RE: Jenaro Kerr  [address]  [facility] [address]     Dear Colleague,    Thank you for referring your patient, Jenaro Kerr, to the [facility] EAR NOSE AND THROAT CLINIC [place] at [facility]. Please see a copy of my visit note below.    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 Status post wide local excision of the left buccal mucosa, left marginal mandibulectomy and left neck dissection by Dr. Ballard and ulnar flap reconstruction by Dr. Clement on 3/17/23. Completed adjuvant radiation on 6/6/23. Surveillance CTs completed on 2/7/24 without evidence of recurrence or metastases.    Interval History:   Patient comes in today for routine surveillance. Patient is doing well. No difficulty with swallowing. Patient denies any dysphagia, odynophagia, new sore throat, mouth pain, ear pain, bleeding from the mouth, hemoptysis, voice changes or lumps/bumps of the neck. Performing jaw exercises. Will have some numbness in the left tongue when performing jaw exercises that can last up to a few hours. Continues to have numbness in the left lower lip.    Past Medical History:  Past Medical History:   Diagnosis Date     Lichen planus      Primary squamous cell carcinoma of oral cavity (H) 02/2023     S/P radiation therapy     6,600 cGy to left buccal completed on 6/6/2023 - [facility]     Past Surgical History:  Past Surgical History:   Procedure Laterality Date     DISSECTION RADICAL NECK MODIFIED Left 03/16/2023    Procedure: left modified radical neck dissection;  Surgeon: Nirmal Ballard MD;  Location: [facility] OR     EXCISE LESION INTRAORAL Left 03/16/2023    Procedure: wide local excision of buccal mucosa;  Surgeon: Nirmal Ballard MD;  Location: [facility] OR     EYE SURGERY Bilateral 2019     GRAFT FREE  VASCULARIZED (LOCATION) Left 2023    Procedure: left forearm free flap;  Surgeon: Vivi Clement MD;  Location: [facility] OR     GRAFT SKIN SPLIT THICKNESS FROM EXTREMITY Right 2023    Procedure: split thickness skin graft from right thigh;  Surgeon: Vivi Clement MD;  Location: [facility]     HERNIA REPAIR Bilateral      LEG SURGERY Right     fracture     MANDIBULECTOMY TOTAL Left 2023    Procedure: left marginal mandibulectomy;  Surgeon: Nirmal Ballard MD;  Location: [facility] OR     IL ANESTH,EAR SURGERY       TRACHEOSTOMY N/A 2023    Procedure: nasogastric tube placement;  Surgeon: Nirmal Ballard MD;  Location: [facility] OR     Medications:  Current Outpatient Medications   Medication Sig Dispense Refill     atenolol (TENORMIN) 25 MG tablet Take 25 mg by mouth daily       atorvastatin (LIPITOR) 80 MG tablet Take 40 mg by mouth every morning       cycloSPORINE (RESTASIS) 0.05 % ophthalmic emulsion two times a day.       lisinopril (ZESTRIL) 30 MG tablet Take 15 mg by mouth every morning       moxifloxacin (VIGAMOX) 0.5 % ophthalmic solution 1 drop 2 times daily       Multiple Vitamins-Minerals (EYE VITAMINS PO) Take by mouth every morning Ocuvite       multivitamin (CENTRUM SILVER) tablet Take 1 tablet by mouth daily       Psyllium (METAMUCIL PO) Take by mouth daily       tamsulosin (FLOMAX) 0.4 MG capsule TAKE ONE CAPSULE BY MOUTH ONE TIME DAILY AFTER A MEAN       Allergies:  Allergies   Allergen Reactions     Erythromycin GI Disturbance     Other reaction(s): Gastrointestinal        Social History:  Social History     Tobacco Use     Smoking status: Former     Current packs/day: 0.00     Average packs/day: 1 pack/day for 25.0 years (25.0 ttl pk-yrs)     Types: Cigarettes     Start date:      Quit date: 2000     Years since quittin.6     Smokeless tobacco: Never   Substance Use Topics     Alcohol use: Yes     Comment: one gin & tonic per week per patient     Drug use: Never     ROS:  "10 point ROS neg other than the symptoms noted above in the HPI.    Physical Exam:    /80   Pulse 54   Ht 1.753 m (5' 9\")   Wt 77.2 kg (170 lb 4.8 oz)   SpO2 99%   BMI 25.15 kg/m    Wt Readings from Last 3 Encounters:   08/20/24 77.2 kg (170 lb 4.8 oz)   05/30/24 76.2 kg (168 lb 1.6 oz)   05/08/24 76.7 kg (169 lb)        Constitutional:  The patient was well-groomed and in no acute distress.     Skin: Normal:  warm and pink without rash    Neurologic: Alert and oriented x 3.  Voice normal.    Psychiatric: The patient's affect was calm, cooperative, and appropriate.     Communication:  Normal; communicates verbally, normal voice quality.    Respiratory: Breathing comfortably without stridor or exertion of accessory muscles.    Oral Cavity: Healthy flap in the left buccal mucosa and retromolar trigone.  The remainder of the oral cavity is normal.  Palpation of the floor mouth, tongue, tongue base is soft and nontender.    Oropharynx: Normal mucosa, palate symmetric with normal elevation. No abnormal lymph tissue in the oropharynx.    Neck: Well healed left neck incision.  Normal range of motion.    Lymphatic: There is no palpable lymphadenopathy in the neck.      Labs and Imaging Reviewed:  Imaging:   CT neck  2/7/24    Impression:  Primary: NI-RADS 1} Expected post-treatment changes in the neck  without evidence of recurrent disease at the primary site.  Neck: NI-RADS 1}  No abnormal lymph node.   Findings suggestive of acute sinusitis.    Labs:  TSH   Date Value Ref Range Status   03/17/2023 0.29 (L) 0.30 - 4.20 uIU/mL Final       Assessment/Plan:  1. 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     Reviewed signs and symptoms that would necessitate a sooner exam including new sore throat, mouth pain, ear pain, dysphagia, bleeding from the mouth or lumps/bumps of " the neck.    Otherwise, patient will follow up in 4 for continued surveillance. Patient will be due for surveillance imaging in February 2025.    Tomasa Avina DNP, APRN, CNP  Otolaryngology  Head & Neck Surgery  [phone]    20 minutes spent on the date of the encounter doing chart review, history and exam, documentation and further activities per the note.      Again, thank you for allowing me to participate in the care of your patient.      Sincerely,    Toyin Avina, NP

## 2024-08-20 NOTE — PROGRESS NOTES
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 Status post wide local excision of the left buccal mucosa, left marginal mandibulectomy and left neck dissection by Dr. Ballard and ulnar flap reconstruction by Dr. Clement on 3/17/23. Completed adjuvant radiation on 6/6/23. Surveillance CTs completed on 2/7/24 without evidence of recurrence or metastases.    Interval History:   Patient comes in today for routine surveillance. Patient is doing well. No difficulty with swallowing. Patient denies any dysphagia, odynophagia, new sore throat, mouth pain, ear pain, bleeding from the mouth, hemoptysis, voice changes or lumps/bumps of the neck. Performing jaw exercises. Will have some numbness in the left tongue when performing jaw exercises that can last up to a few hours. Continues to have numbness in the left lower lip.    Past Medical History:  Past Medical History:   Diagnosis Date    Lichen planus     Primary squamous cell carcinoma of oral cavity (H) 02/2023    S/P radiation therapy     6,600 cGy to left buccal completed on 6/6/2023 - [facility]     Past Surgical History:  Past Surgical History:   Procedure Laterality Date    DISSECTION RADICAL NECK MODIFIED Left 03/16/2023    Procedure: left modified radical neck dissection;  Surgeon: Nirmal Ballard MD;  [facility]: [facility]    EXCISE LESION INTRAORAL Left 03/16/2023    Procedure: wide local excision of buccal mucosa;  Surgeon: Nirmal Ballard MD;  [facility]    EYE SURGERY Bilateral 2019    GRAFT FREE VASCULARIZED (LOCATION) Left 03/16/2023    Procedure: left forearm free flap;  Surgeon: Vivi Clement MD;  [facility]: [facility]    GRAFT SKIN SPLIT THICKNESS FROM EXTREMITY Right 03/16/2023    Procedure: split thickness skin graft from right thigh;  Surgeon: Vivi Clement MD;  [facility]    HERNIA REPAIR Bilateral   "   LEG SURGERY Right     fracture    MANDIBULECTOMY TOTAL Left 2023    Procedure: left marginal mandibulectomy;  Surgeon: Nirmal Ballard MD;  [facility]    NM ANESTH,EAR SURGERY      TRACHEOSTOMY N/A 2023    Procedure: nasogastric tube placement;  Surgeon: Nirmal Ballard MD;  Location: [facility]     Medications:  Current Outpatient Medications   Medication Sig Dispense Refill    atenolol (TENORMIN) 25 MG tablet Take 25 mg by mouth daily      atorvastatin (LIPITOR) 80 MG tablet Take 40 mg by mouth every morning      cycloSPORINE (RESTASIS) 0.05 % ophthalmic emulsion two times a day.      lisinopril (ZESTRIL) 30 MG tablet Take 15 mg by mouth every morning      moxifloxacin (VIGAMOX) 0.5 % ophthalmic solution 1 drop 2 times daily      Multiple Vitamins-Minerals (EYE VITAMINS PO) Take by mouth every morning Ocuvite      multivitamin (CENTRUM SILVER) tablet Take 1 tablet by mouth daily      Psyllium (METAMUCIL PO) Take by mouth daily      tamsulosin (FLOMAX) 0.4 MG capsule TAKE ONE CAPSULE BY MOUTH ONE TIME DAILY AFTER A MEAN       Allergies:  Allergies   Allergen Reactions    Erythromycin GI Disturbance     Other reaction(s): Gastrointestinal        Social History:  Social History     Tobacco Use    Smoking status: Former     Current packs/day: 0.00     Average packs/day: 1 pack/day for 25.0 years (25.0 ttl pk-yrs)     Types: Cigarettes     Start date:      Quit date:      Years since quittin.6    Smokeless tobacco: Never   Substance Use Topics    Alcohol use: Yes     Comment: one gin & tonic per week per patient    Drug use: Never     ROS: 10 point ROS neg other than the symptoms noted above in the HPI.    Physical Exam:    /80   Pulse 54   Ht 1.753 m (5' 9\")   Wt 77.2 kg (170 lb 4.8 oz)   SpO2 99%   BMI 25.15 kg/m    Wt Readings from Last 3 Encounters:   24 77.2 kg (170 lb 4.8 oz)   24 76.2 kg (168 lb 1.6 oz)   24 76.7 kg (169 lb)        Constitutional:  " The patient was well-groomed and in no acute distress.     Skin: Normal:  warm and pink without rash    Neurologic: Alert and oriented x 3.  Voice normal.    Psychiatric: The patient's affect was calm, cooperative, and appropriate.     Communication:  Normal; communicates verbally, normal voice quality.    Respiratory: Breathing comfortably without stridor or exertion of accessory muscles.    Oral Cavity: Healthy flap in the left buccal mucosa and retromolar trigone.  The remainder of the oral cavity is normal.  Palpation of the floor mouth, tongue, tongue base is soft and nontender.    Oropharynx: Normal mucosa, palate symmetric with normal elevation. No abnormal lymph tissue in the oropharynx.    Neck: Well healed left neck incision.  Normal range of motion.    Lymphatic: There is no palpable lymphadenopathy in the neck.      Labs and Imaging Reviewed:  Imaging:   CT neck  2/7/24    Impression:  Primary: NI-RADS 1} Expected post-treatment changes in the neck  without evidence of recurrent disease at the primary site.  Neck: NI-RADS 1}  No abnormal lymph node.   Findings suggestive of acute sinusitis.    Labs:  TSH   Date Value Ref Range Status   03/17/2023 0.29 (L) 0.30 - 4.20 uIU/mL Final       Assessment/Plan:  1. 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     Reviewed signs and symptoms that would necessitate a sooner exam including new sore throat, mouth pain, ear pain, dysphagia, bleeding from the mouth or lumps/bumps of the neck.    Otherwise, patient will follow up in 4 for continued surveillance. Patient will be due for surveillance imaging in February 2025.    Tomasa Avina DNP, APRN, CNP  Otolaryngology  Head & Neck Surgery  [phone]    20 minutes spent on the date of the encounter doing chart review, history and exam, documentation and further activities  per the note.

## 2024-08-20 NOTE — PROGRESS NOTES
[facility]                                                                                   OUTPATIENT SPEECH LANGUAGE PATHOLOGY    PLAN OF TREATMENT FOR OUTPATIENT REHABILITATION   Patient's Last Name, First Name, Jenaro Casanova YOB: 1950   Provider's Name   [facility]   Medical Record No.  [id number]     Onset Date: 03/16/23 (Surgery date) Start of Care Date: 09/27/23     Medical Diagnosis:  SCC left buccal mucosa      SLP Treatment Diagnosis: Mild oropharyngeal dysphagia  Plan of Treatment  Frequency/Duration: 1x/2-3 months  / 6 months     Certification date from (P) 06/23/24   To (P) 09/21/24          See note for plan of treatment details and functional goals     TOÑA Boles                         I CERTIFY THE NEED FOR THESE SERVICES FURNISHED UNDER        THIS PLAN OF TREATMENT AND WHILE UNDER MY CARE     (Physician attestation of this document indicates review and certification of the therapy plan).              Referring Provider:  Dr. Nirmal Ballard    Initial Assessment  See Epic Evaluation- 09/27/23 08/20/24 1050   Appointment Info   Treating Provider Zhanna Rosario MA, CCC-SLP   Medical Diagnosis SCC left buccal mucosa   SLP Tx Diagnosis Mild oropharyngeal dysphagia   Session Number   Session Number 7   Progress Note/Certification   Start Of Care Date 09/27/23   Onset Of Illness/injury Or Date Of Surgery 03/16/23  (Surgery date)   Therapy Frequency 1x/2-3 months   Predicted Duration 6 months   Certification date from 06/23/24   Certification date to 09/21/24   Subjective Report   Subjective Report Wilbert Kerr is a 74-year-old man who underwent WLE of left buccal mucosa, left marginal mandibulectomy, left neck dissection 1b-4 and reconstruction with left RFFF on 3/16/23 for SCC of the left buccal mucosa. He then underwent adjuvant XRT (left neck and oral cavity) finishing on 6/6/23. He is seen today in  conjunction with ENT clinic visit accompanied by his wife. Reports doing well.   SLP Goals   SLP Goals 2;1   SLP Goal 1   Goal Identifier PO   Goal Description Pt will tolerate least restrictive diet while adhering to safe swallow precautions independently across 3 months time.   Rationale To maximize safety, ease and/or independence of oral intake   Goal Progress Pt reports PO intake continues to go well. He continues to struggle with some xerostomia from treatment.   Target Date 09/21/24   SLP Goal 2   Goal Identifier Exercises   Goal Description Pt will improve and/or maintain ROM and strength of oral mechanism to minimize long term impact of radiation fibrosis on the swallow musculature.   Rationale To maximize safety, ease and/or independence of oral intake   Goal Progress Pt has been very diligent with exercises completing 15 reps, 2x/day. He has self increased to 20 reps occasionally. He notices with the jaw stretch that sometimes if he pushes it a lot, his tongue will feel numb. Discussed backing off on the jaw stretch so that this does not happen. Provided information and education on long term impact of radiation induced fibrosis on swallow musculature.   Target Date 09/21/24   Treatment Interventions (SLP)   Treatment Interventions Treatment Swallow/Oral dysfunction   Treatment Swallow/Oral dysfunction   Treatment of Swallowing Dysfunction &/or Oral Function for Feeding Minutes (92795) 21 Minutes   Skilled Intervention Provided written and verbal information on diet modifications.;Educated patient on swallowing strategies.;Educated patient on risks of aspiration;Demonstrated safe swallow strategies;Assessed oral intake trials;Other   Patient Response/Progress Pt and his wife demonstrated understanding.   Education   Learner/Method Patient;Significant Other   Plan   Updates to plan of care See above.   Plan for next session f//u in conjunction with ENT clinic visit to reassess PO intake/tolerance and home  exercise program/jaw ROM   Total Session Time   Total Treatment Time (sum of timed and untimed services) 21   OTHER   SLP Swallow Goals 1;2

## 2024-12-10 ENCOUNTER — TELEPHONE (OUTPATIENT)
Dept: OTOLARYNGOLOGY | Facility: CLINIC | Age: 74
End: 2024-12-10
Payer: COMMERCIAL

## 2024-12-17 ENCOUNTER — HOSPITAL ENCOUNTER (OUTPATIENT)
Dept: CT IMAGING | Facility: CLINIC | Age: 74
Discharge: HOME | End: 2024-12-17
Attending: REGISTERED NURSE
Payer: COMMERCIAL

## 2024-12-17 DIAGNOSIS — C06.0 SQUAMOUS CELL CARCINOMA OF ORAL MUCOSA (H): ICD-10-CM

## 2024-12-17 LAB
CREAT BLD-MCNC: 0.9 MG/DL (ref 0.7–1.3)
EGFRCR SERPLBLD CKD-EPI 2021: >60 ML/MIN/1.73M2

## 2024-12-17 PROCEDURE — 71260 CT THORAX DX C+: CPT

## 2024-12-17 PROCEDURE — 82565 ASSAY OF CREATININE: CPT

## 2024-12-17 PROCEDURE — 250N000009 HC RX 250: Performed by: REGISTERED NURSE

## 2024-12-17 PROCEDURE — 250N000011 HC RX IP 250 OP 636: Performed by: REGISTERED NURSE

## 2024-12-17 PROCEDURE — 70491 CT SOFT TISSUE NECK W/DYE: CPT

## 2024-12-17 RX ORDER — IOPAMIDOL 755 MG/ML
500 INJECTION, SOLUTION INTRAVASCULAR ONCE
Status: COMPLETED | OUTPATIENT
Start: 2024-12-17 | End: 2024-12-17

## 2024-12-17 RX ADMIN — SODIUM CHLORIDE 60 ML: 9 INJECTION, SOLUTION INTRAVENOUS at 09:25

## 2024-12-17 RX ADMIN — IOPAMIDOL 100 ML: 755 INJECTION, SOLUTION INTRAVENOUS at 09:26

## 2024-12-18 ENCOUNTER — OFFICE VISIT (OUTPATIENT)
Dept: OTOLARYNGOLOGY | Facility: CLINIC | Age: 74
End: 2024-12-18
Payer: COMMERCIAL

## 2024-12-18 ENCOUNTER — THERAPY VISIT (OUTPATIENT)
Dept: SPEECH THERAPY | Facility: CLINIC | Age: 74
End: 2024-12-18
Payer: COMMERCIAL

## 2024-12-18 VITALS
DIASTOLIC BLOOD PRESSURE: 70 MMHG | BODY MASS INDEX: 23.99 KG/M2 | OXYGEN SATURATION: 99 % | WEIGHT: 167.6 LBS | HEIGHT: 70 IN | SYSTOLIC BLOOD PRESSURE: 133 MMHG | HEART RATE: 64 BPM

## 2024-12-18 DIAGNOSIS — C06.0 SQUAMOUS CELL CARCINOMA OF ORAL MUCOSA (H): Primary | ICD-10-CM

## 2024-12-18 DIAGNOSIS — R13.12 OROPHARYNGEAL DYSPHAGIA: ICD-10-CM

## 2024-12-18 DIAGNOSIS — C06.9 PRIMARY SQUAMOUS CELL CARCINOMA OF ORAL CAVITY (H): ICD-10-CM

## 2024-12-18 RX ORDER — SULFAMETHOXAZOLE AND TRIMETHOPRIM 800; 160 MG/1; MG/1
TABLET ORAL
COMMUNITY
Start: 2024-12-16

## 2024-12-18 ASSESSMENT — PAIN SCALES - GENERAL: PAINLEVEL_OUTOF10: NO PAIN (0)

## 2024-12-18 NOTE — PATIENT INSTRUCTIONS
1. Please follow-up in clinic in 4 months.   2. Please call the ENT clinic with any questions,concerns, new or worsening symptoms.    -Clinic number is [phone]   - Leonor's direct line (Dr. Ballard's nurse) [phone]

## 2024-12-18 NOTE — LETTER
12/18/2024       RE: Jenaro Kerr  [address]  [facility] [address]     Dear Colleague,    Thank you for referring your patient, Jenaro Kerr, to the [facility] EAR NOSE AND THROAT CLINIC [place] at [facility]. Please see a copy of my visit note below.    PRIOR ONCOLOGIC HISTORY: Mr. 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  Dr. Clement performed an ulnar flap reconstruction.  The patient then completed postoperative radiation therapy with Dr. Luther in [place] on June 6, 2023.      INTERVAL HISTORY: He has been doing well, eating well.  He denies pain and feels his saliva is adequate. He has been exercising his jaw and feels his jaw opening is very good.  He had CT scan of his chest/abdomen today.  He has mild pain occasionally in his left jaw on opening, otherwise has no new mass or lump.          PHYSICAL EXAMINATION: Patient is well-appearing and in no distress summation of the oral cavity reveals the flap looks healthy reconstruct the left buccal mucosa and retromolar trigone.  The remainder of the oral cavity including the tongue, floor mouth, gingival and buccal mucosa, retromolar trigone, hard and soft palate, and posterior pharyngeal wall look normal.  Palpation of the floor mouth, tongue, tongue base all look normal as well.  He cannot tart mirror exam.  Examination of the neck reveals lymphedema in the midline his, his marginal mandibular nerve is about a 2 out of 6.  His scar in his neck has healed well and he has no lymphadenopathy.     IMPRESSION: EUNICE     PLAN:  1.    I  will see him in 3-4 months for follow up      Again, thank you for allowing me to participate in the care of your patient.      Sincerely,    Nirmal Ballard MD

## 2024-12-18 NOTE — PROGRESS NOTES
[facility]                                                                                   OUTPATIENT SPEECH LANGUAGE PATHOLOGY    PLAN OF TREATMENT FOR OUTPATIENT REHABILITATION   Patient's Last Name, First Name, Jenaro Casanova YOB: 1950   Provider's Name   [facility]   Medical Record No.  [id number]     Onset Date: 03/16/23 (Surgery date) Start of Care Date: 09/27/23     Medical Diagnosis:  SCC left buccal mucosa      SLP Treatment Diagnosis: Mild oropharyngeal dysphagia  Plan of Treatment  Frequency/Duration: 1x/2-3 months  / 6 months     Certification date from 09/22/24   To 12/21/24          See note for plan of treatment details and functional goals     TOÑA Boles                         I CERTIFY THE NEED FOR THESE SERVICES FURNISHED UNDER        THIS PLAN OF TREATMENT AND WHILE UNDER MY CARE     (Physician attestation of this document indicates review and certification of the therapy plan).              Referring Provider:  Dr. Nirmla Ballard    Initial Assessment  See Epic Evaluation- 09/27/23 12/18/24 1400   Appointment Info   Treating Provider Zhanna Rosario MA, CCC-SLP   Medical Diagnosis SCC left buccal mucosa   SLP Tx Diagnosis Mild oropharyngeal dysphagia   Session Number   Session Number 8   Progress Note/Certification   Start Of Care Date 09/27/23   Onset Of Illness/injury Or Date Of Surgery 03/16/23  (Surgery date)   Therapy Frequency 1x/2-3 months   Predicted Duration 6 months   Certification date from 09/22/24   Certification date to 12/21/24   Subjective Report   Subjective Report Wilbert Kerr is a 74-year-old man who underwent WLE of left buccal mucosa, left marginal mandibulectomy, left neck dissection 1b-4 and reconstruction with left RFFF on 3/16/23 for SCC of the left buccal mucosa. He then underwent adjuvant XRT (left neck and oral cavity) finishing on 6/6/23. He is seen today in  conjunction with ENT clinic visit accompanied by his wife. Reports tingling in his tongue when doing the jaw stretch.   SLP Goals   SLP Goals 2;1   SLP Goal 1   Goal Identifier PO   Goal Description Pt will tolerate least restrictive diet while adhering to safe swallow precautions independently across 3 months time.   Rationale To maximize safety, ease and/or independence of oral intake   Goal Progress Pt reports PO intake is going well. Noticing improvement as trismus is stable. Encouraged him to continue pushing PO intake. Inquired about lymphedema; encouraged him to discuss with MD.   Target Date 12/21/24   SLP Goal 2   Goal Identifier Exercises   Goal Description Pt will improve and/or maintain ROM and strength of oral mechanism to minimize long term impact of radiation fibrosis on the swallow musculature.   Rationale To maximize safety, ease and/or independence of oral intake   Goal Progress Pt continue exercises regularly. He is very diligent about stretching. He expresses worrying about risk of long term dysphagia and radiation induced fibrosis. Discussed he is doing everything he can to reduce risk of this happening in the future and that is all we can do it be proactive. Discussed nerve changes can happen regardless of exercising. Encouraged him to keep an eye on the tongue tingling..   Target Date 12/21/24   Treatment Interventions (SLP)   Treatment Interventions Treatment Swallow/Oral dysfunction   Treatment Swallow/Oral dysfunction   Treatment of Swallowing Dysfunction &/or Oral Function for Feeding Minutes (86616) 20 Minutes   Skilled Intervention Provided written and verbal information on diet modifications.;Educated patient on swallowing strategies.;Educated patient on risks of aspiration;Demonstrated safe swallow strategies;Assessed oral intake trials;Other   Patient Response/Progress Pt and his wife demonstrated understanding.   Education   Learner/Method Patient;Significant Other   Plan   Updates  to plan of care See above.   Plan for next session f//u in conjunction with ENT clinic visit to reassess PO intake/tolerance and home exercise program/jaw ROM   Total Session Time   Total Treatment Time (sum of timed and untimed services) 20   OTHER   RETIRED SLP Swallow Goals 1;2

## 2024-12-19 NOTE — PROGRESS NOTES
PRIOR ONCOLOGIC HISTORY: Mr. 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  Dr. Clement performed an ulnar flap reconstruction.  The patient then completed postoperative radiation therapy with Dr. Luther in [place] on June 6, 2023.      INTERVAL HISTORY: He has been doing well, eating well.  He denies pain and feels his saliva is adequate. He has been exercising his jaw and feels his jaw opening is very good.  He had CT scan of his chest/abdomen today.  He has mild pain occasionally in his left jaw on opening, otherwise has no new mass or lump.          PHYSICAL EXAMINATION: Patient is well-appearing and in no distress summation of the oral cavity reveals the flap looks healthy reconstruct the left buccal mucosa and retromolar trigone.  The remainder of the oral cavity including the tongue, floor mouth, gingival and buccal mucosa, retromolar trigone, hard and soft palate, and posterior pharyngeal wall look normal.  Palpation of the floor mouth, tongue, tongue base all look normal as well.  He cannot tart mirror exam.  Examination of the neck reveals lymphedema in the midline his, his marginal mandibular nerve is about a 2 out of 6.  His scar in his neck has healed well and he has no lymphadenopathy.     IMPRESSION: EUNICE     PLAN:  1.    I  will see him in 3-4 months for follow up

## 2024-12-20 ENCOUNTER — OFFICE VISIT (OUTPATIENT)
Dept: RADIATION ONCOLOGY | Facility: CLINIC | Age: 74
End: 2024-12-20
Payer: COMMERCIAL

## 2024-12-20 VITALS
DIASTOLIC BLOOD PRESSURE: 70 MMHG | BODY MASS INDEX: 24.09 KG/M2 | OXYGEN SATURATION: 97 % | WEIGHT: 167.9 LBS | SYSTOLIC BLOOD PRESSURE: 116 MMHG | RESPIRATION RATE: 16 BRPM | TEMPERATURE: 98 F | HEART RATE: 60 BPM

## 2024-12-20 DIAGNOSIS — C06.0 SQUAMOUS CELL CARCINOMA OF ORAL MUCOSA (H): Primary | ICD-10-CM

## 2024-12-20 ASSESSMENT — PAIN SCALES - GENERAL: PAINLEVEL_OUTOF10: NO PAIN (0)

## 2024-12-20 NOTE — PATIENT INSTRUCTIONS
Please contact [facility] RN with questions or concerns following today's appointment.    If you are a patient of Dr. Luther call: [phone]   If you are a patient of Dr. Alvarado call: [phone]    Please feel free to leave a detailed message if your call is not answered.    If your call is not received before 3:00 PM, it may not be returned until the following business day.    If you are receiving radiation treatment and need assistance after 3:00 PM or on the weekends, please call [phone] and ask to speak to the radiation oncologist on-call.    Thank you!    [facility] Radiation Oncology Nursing

## 2024-12-20 NOTE — NURSING NOTE
"Oncology Rooming Note    December 20, 2024 11:31 AM   Jenaro Kerr is a 74 year old male who presents for:    Chief Complaint   Patient presents with    Cancer     Radiation Oncology Return Visit with Dr. Luther     Initial Vitals: /70 (BP Location: Left arm, Patient Position: Sitting, Cuff Size: Adult Regular)   Pulse 60   Temp 98  F (36.7  C) (Oral)   Resp 16   Wt 76.2 kg (167 lb 14.4 oz)   SpO2 97%   BMI 24.09 kg/m   Estimated body mass index is 24.09 kg/m  as calculated from the following:    Height as of 12/18/24: 1.778 m (5' 10\").    Weight as of this encounter: 76.2 kg (167 lb 14.4 oz). Body surface area is 1.94 meters squared.  No Pain (0) Comment: Data Unavailable   No LMP for male patient.  Allergies reviewed: Yes  Medications reviewed: Yes    Medications: Medication refills not needed today.  Pharmacy name entered into [facility]: [facility] [address] [place] [id number] [facility]    Frailty Screening:   Is the patient here for a new oncology consult visit in cancer care? 2. No      Clinical concerns: Radiation Oncology Return  Dr. Luther was notified.      Lottie Martin             "

## 2024-12-20 NOTE — LETTER
2024      Jenaro Kerr  [address]  [facility] [address]      Dear Colleague,    Thank you for referring your patient, Jenaro Kerr, to the [facility] RADIATION ONCOLOGY [place]. Please see a copy of my visit note below.         Department of Radiation Oncology  [facility]: Cancer Center  [facility]  [address] [address] [place] [address]  [phone]       Radiation Oncology Follow-up Visit  Dec 20, 2024      Jenaro Kerr  MRN: [id number]   : 1950     DIAGNOSIS / ID: Mr. 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 Kitchen) with pathology revealing 3.0cm SCC, 7mm DOI, no LVSI, no PNI, negative margins (close <1mm margin deep), no bony invasion.      INTENT OF RADIOTHERAPY: Adjuvant     CONCURRENT SYSTEMIC THERAPY: No              SITE OF TREATMENT: oral cavity, left neck     DATES  OF TREATMENT: 2023- 23     TOTAL DOSE OF TREATMENT / FRACTIONS: 66Gy/33fx    INTERVAL SINCE COMPLETION OF RADIATION THERAPY: 18 months    SUBJECTIVE:     Post treatment pet PET/CT 23 did not reveal any locoregional or distant recurrence.    CT scan of the neck and chest was performed on 2024 which did not demonstrate any evidence of local regional or distant metastasis.  There was a mention of small pulmonary nodules, with recommendation for close follow-up.    CT scan on 2024 did not demonstrate any regional or distant disease.    He has seen Dr. Ballard on 2024 with no evidence of disease.    He seen Dr. Ballard on 2023, with no evidence of any recurrence.    Interval history:    CT - no evidence of locoregional or distant disease.    He has seen Dr. Ballard recently, exam negative.    Overall, patient is doing well since completing radiation.  He denies any significant pain, trismus dysphagia, odynophagia.  He  is tolerating solid and liquid foods, with out significant xerostomia, weight is now stable.  He is fairly stable from last visit.  He has some taste with certain food.  He is still performing his SLP exercise. He is seeing dentist regularly.       PHYSICAL EXAM:  /70 (BP Location: Left arm, Patient Position: Sitting, Cuff Size: Adult Regular)   Pulse 60   Temp 98  F (36.7  C) (Oral)   Resp 16   Wt 76.2 kg (167 lb 14.4 oz)   SpO2 97%   BMI 24.09 kg/m    Gen: Alert, in NAD  Eyes: PERRL, EOMI, sclera anicteric  HENT     Head: NC/AT     Ears: No external auricular lesions     Nose/sinus: No rhinorrhea or epistaxis     Oral Cavity/Oropharynx: MMM, external examination does not reveal any sign of a local recurrence, left buccal/gingival flap is well-healed, post radiation changes   Neck: Supple, full ROM, no LAD  Pulm: No wheezing, stridor or respiratory distress  CV: Well-perfused, no cyanosis, no pedal edema  Abdominal: Soft, nontender, nondistended, no hepatomegaly  Back: No step-offs or pain to palpation along the thoracolumbar spine, no CVA tenderness  Rectal/: Deferred  Musculoskeletal: Normal bulk and tone   Skin: Normal color and turgor  Neurologic: A/Ox3, CN II-XII intact  Psychiatric: Appropriate mood and affect    LABS AND IMAGING:  Per HPI, reviewed and in agreement    I reviewed 's notes    IMPRESSION:   Overall, patient is doing well from a radiation acute toxicity perspective.  There are no signs of recurrence clinically or radiographically, patient is EUNICE.     PLAN:   1.  Follow-up with radiation oncology in 6 months ([facility] [place], given that I will be relocating for another job at the end of the year)  2. Follow up with Dr. Ballard in 3-4 months       Jimmy Luther M.D.  Department of Radiation Oncology  [facility]     A total of 30 minutes were spent on this visit, including preparation for this visit, face to face with patient, and medical decision making.  Medical decision-making included consideration and possible diagnosis, management options, complex record review, review of diagnostic tests, consideration and discussion of significant complications based up medical history/comorbidities, and discussion with providers involved in care of the patient.       Again, thank you for allowing me to participate in the care of your patient.        Sincerely,        Jimmy Luther MD    Electronically signed

## 2024-12-25 NOTE — PROGRESS NOTES
Department of Radiation Oncology  [facility]    Health: Cancer Center  [facility]  [address] [address] [place] [address] [phone]       Radiation Oncology Follow-up Visit  Dec 20, 2024      Jenaro Kerr  MRN: [id number]   : 1950     DIAGNOSIS / ID: Mr. 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 Kitchen) with pathology revealing 3.0cm SCC, 7mm DOI, no LVSI, no PNI, negative margins (close <1mm margin deep), no bony invasion.      INTENT OF RADIOTHERAPY: Adjuvant     CONCURRENT SYSTEMIC THERAPY: No              SITE OF TREATMENT: oral cavity, left neck     DATES  OF TREATMENT: 2023- 23     TOTAL DOSE OF TREATMENT / FRACTIONS: 66Gy/33fx    INTERVAL SINCE COMPLETION OF RADIATION THERAPY: 18 months    SUBJECTIVE:     Post treatment pet PET/CT 23 did not reveal any locoregional or distant recurrence.    CT scan of the neck and chest was performed on 2024 which did not demonstrate any evidence of local regional or distant metastasis.  There was a mention of small pulmonary nodules, with recommendation for close follow-up.    CT scan on 2024 did not demonstrate any regional or distant disease.    He has seen Dr. Ballard on 2024 with no evidence of disease.    He seen Dr. Ballard on 2023, with no evidence of any recurrence.    Interval history:    CT - no evidence of locoregional or distant disease.    He has seen Dr. Ballard recently, exam negative.    Overall, patient is doing well since completing radiation.  He denies any significant pain, trismus dysphagia, odynophagia.  He is tolerating solid and liquid foods, with out significant xerostomia, weight is now stable.  He is fairly stable from last visit.  He has some taste with certain food.  He is still performing his SLP exercise. He is seeing dentist regularly.        PHYSICAL EXAM:  /70 (BP Location: Left arm, Patient Position: Sitting, Cuff Size: Adult Regular)   Pulse 60   Temp 98  F (36.7  C) (Oral)   Resp 16   Wt 76.2 kg (167 lb 14.4 oz)   SpO2 97%   BMI 24.09 kg/m    Gen: Alert, in NAD  Eyes: PERRL, EOMI, sclera anicteric  HENT     Head: NC/AT     Ears: No external auricular lesions     Nose/sinus: No rhinorrhea or epistaxis     Oral Cavity/Oropharynx: MMM, external examination does not reveal any sign of a local recurrence, left buccal/gingival flap is well-healed, post radiation changes   Neck: Supple, full ROM, no LAD  Pulm: No wheezing, stridor or respiratory distress  CV: Well-perfused, no cyanosis, no pedal edema  Abdominal: Soft, nontender, nondistended, no hepatomegaly  Back: No step-offs or pain to palpation along the thoracolumbar spine, no CVA tenderness  Rectal/: Deferred  Musculoskeletal: Normal bulk and tone   Skin: Normal color and turgor  Neurologic: A/Ox3, CN II-XII intact  Psychiatric: Appropriate mood and affect    LABS AND IMAGING:  Per HPI, reviewed and in agreement    I reviewed 's notes    IMPRESSION:   Overall, patient is doing well from a radiation acute toxicity perspective.  There are no signs of recurrence clinically or radiographically, patient is EUNICE.     PLAN:   1.  Follow-up with radiation oncology in 6 months ([facility] [place], given that I will be relocating for another job at the end of the year)  2. Follow up with Dr. Ballard in 3-4 months       Jimmy Luther M.D.  Department of Radiation Oncology  [facility]     A total of 30 minutes were spent on this visit, including preparation for this visit, face to face with patient, and medical decision making. Medical decision-making included consideration and possible diagnosis, management options, complex record review, review of diagnostic tests, consideration and discussion of significant complications based up medical history/comorbidities, and  discussion with providers involved in care of the patient.

## 2025-03-04 ENCOUNTER — TELEPHONE (OUTPATIENT)
Dept: OTOLARYNGOLOGY | Facility: CLINIC | Age: 75
End: 2025-03-04
Payer: COMMERCIAL

## 2025-03-04 NOTE — TELEPHONE ENCOUNTER
Patient confirmed scheduled appointment:     Date: 5/5/25  Time: 12:30PM  Appointment Type: Return ENT  Provider: Tomasa Avina NP   Location: [facility]  Testing/imaging:   Additional Notes:

## 2025-04-12 ENCOUNTER — HEALTH MAINTENANCE LETTER (OUTPATIENT)
Age: 75
End: 2025-04-12

## 2025-05-05 ENCOUNTER — THERAPY VISIT (OUTPATIENT)
Dept: SPEECH THERAPY | Facility: CLINIC | Age: 75
End: 2025-05-05
Payer: COMMERCIAL

## 2025-05-05 DIAGNOSIS — C06.9 PRIMARY SQUAMOUS CELL CARCINOMA OF ORAL CAVITY (H): ICD-10-CM

## 2025-05-05 DIAGNOSIS — C06.0 SQUAMOUS CELL CARCINOMA OF ORAL MUCOSA (H): Primary | ICD-10-CM

## 2025-05-05 DIAGNOSIS — R13.12 OROPHARYNGEAL DYSPHAGIA: ICD-10-CM

## 2025-05-05 PROCEDURE — 99207 PR NO CHARGE LOS: CPT | Mod: GN | Performed by: SPEECH-LANGUAGE PATHOLOGIST

## 2025-05-05 NOTE — PROGRESS NOTES
Pt seen for allied health visit in conjunction with ENT clinic visit. Pt reports swallowing is going well and he is continuing to complete swallow exercises. Plan for one more visit at next ENT follow up, then consider discharge from SLP services.     MECHE Maza MA (music), CCC-SLP   Speech Language Pathologist  [facility] Trained Vocologist   [facility]  Dept. of Otolaryngology  Department of Rehabilitation Services  [address] [place] [address]  Email: [email or web address]   Phone: [phone]  Pronouns: she/her/hers

## 2025-05-19 DIAGNOSIS — C06.0 SQUAMOUS CELL CARCINOMA OF ORAL MUCOSA (H): Primary | ICD-10-CM

## 2025-05-27 ENCOUNTER — HOSPITAL ENCOUNTER (OUTPATIENT)
Dept: CT IMAGING | Facility: CLINIC | Age: 75
Discharge: HOME | End: 2025-05-27
Attending: REGISTERED NURSE
Payer: COMMERCIAL

## 2025-05-27 DIAGNOSIS — C06.0 SQUAMOUS CELL CARCINOMA OF ORAL MUCOSA (H): ICD-10-CM

## 2025-05-27 LAB
CREAT BLD-MCNC: 0.9 MG/DL (ref 0.7–1.2)
EGFRCR SERPLBLD CKD-EPI 2021: >60 ML/MIN/1.73M2

## 2025-05-27 PROCEDURE — 82565 ASSAY OF CREATININE: CPT

## 2025-05-27 PROCEDURE — 250N000011 HC RX IP 250 OP 636: Performed by: REGISTERED NURSE

## 2025-05-27 PROCEDURE — 250N000009 HC RX 250: Performed by: REGISTERED NURSE

## 2025-05-27 PROCEDURE — 70491 CT SOFT TISSUE NECK W/DYE: CPT

## 2025-05-27 RX ORDER — IOPAMIDOL 755 MG/ML
500 INJECTION, SOLUTION INTRAVASCULAR ONCE
Status: COMPLETED | OUTPATIENT
Start: 2025-05-27 | End: 2025-05-27

## 2025-05-27 RX ADMIN — IOPAMIDOL 90 ML: 755 INJECTION, SOLUTION INTRAVENOUS at 11:02

## 2025-05-27 RX ADMIN — SODIUM CHLORIDE 70 ML: 9 INJECTION, SOLUTION INTRAVENOUS at 11:02

## 2025-05-28 ENCOUNTER — RESULTS FOLLOW-UP (OUTPATIENT)
Dept: OTOLARYNGOLOGY | Facility: CLINIC | Age: 75
End: 2025-05-28

## 2025-07-05 ENCOUNTER — HEALTH MAINTENANCE LETTER (OUTPATIENT)
Age: 75
End: 2025-07-05

## (undated) DEVICE — ESU CORD BIPOLAR GREEN 10-4000

## (undated) DEVICE — LINEN TOWEL PACK X30 5481

## (undated) DEVICE — RETR ELASTIC STAYS LONE STAR BLUNT DUAL LEAD 3550-1G

## (undated) DEVICE — CLIP GEM MICRO GEM2431

## (undated) DEVICE — TOURNIQUET SGL BLADDER 18"X4" RED 5921-218-135

## (undated) DEVICE — TONGUE DEPRESSOR STERILE 25-705-ALL

## (undated) DEVICE — PREP POVIDONE-IODINE 7.5% SCRUB 4OZ BOTTLE MDS093945

## (undated) DEVICE — DRAIN JACKSON PRATT 10MM FLAT 4/4 PERF SU130-1311

## (undated) DEVICE — SU SILK 2-0 TIE 12X30" A305H

## (undated) DEVICE — DRAPE MICROSCOPE LEICA 54X120" 09-MK653

## (undated) DEVICE — GLOVE BIOGEL PI MICRO SZ 7.0 48570

## (undated) DEVICE — DRAIN PENROSE 1X18" LATEX FREE GR207

## (undated) DEVICE — DRAPE MAYO STAND 23X54 8337

## (undated) DEVICE — SU ETHILON 5-0 P-3 18" BLACK 698G

## (undated) DEVICE — SU SILK 4-0 TIE 12X30" A303H

## (undated) DEVICE — SU SILK 0 TIE 6X30" A306H

## (undated) DEVICE — TONGUE DEPRESSOR STERILE 6023

## (undated) DEVICE — SOL NACL 0.9% IRRIG 1000ML BOTTLE 2F7124

## (undated) DEVICE — EYE INSTRUMENT WIPE MEROCEL W/ADHESIVE 223625

## (undated) DEVICE — CLIP HORIZON MED BLUE 002200

## (undated) DEVICE — ESU ELEC BLADE 2.75" COATED/INSULATED E1455

## (undated) DEVICE — BLADE KNIFE SURG 15 371115

## (undated) DEVICE — Device

## (undated) DEVICE — SPONGE RAY-TEC 4X8" 7318

## (undated) DEVICE — BLADE SAW OSCIL/SAG STRK MICRO 9X18.5X0.38MM 2296-003-155

## (undated) DEVICE — DRAPE POUCH INSTRUMENT 1018

## (undated) DEVICE — SU CHROMIC 4-0 SH 27" G121H

## (undated) DEVICE — NDL ANGIOCATH 20GA 1.25" 4056

## (undated) DEVICE — DRAPE SHEET MED 44X70" 9355

## (undated) DEVICE — DRAPE STOCKINETTE IMPERVIOUS 10" 21048

## (undated) DEVICE — NDL COUNTER 40CT  31142311

## (undated) DEVICE — DRSG BIATAIN ALGINATE 4X4" 3710

## (undated) DEVICE — PACK SET-UP STD 9102

## (undated) DEVICE — SU ETHILON 3-0 PS-1 18" 1663H

## (undated) DEVICE — COVER CAMERA VIDEO LASER 9X96" 04-CC227

## (undated) DEVICE — DRAPE CONVERTORS U-DRAPE 60X72" 8476

## (undated) DEVICE — SU VICRYL 3-0 SH 8X18" UND J864D

## (undated) DEVICE — SU ETHILON 4-0 PC-3 18" 1864G

## (undated) DEVICE — PACK GOWN 3/PK DISP XL SBA32GPFCB

## (undated) DEVICE — CABLE DOPPLER FLOW EXTENSION  DP-CAB01

## (undated) DEVICE — SPONGE LAP 18X18" X8435

## (undated) DEVICE — ESU CORD BIPOLAR AND IRR TUBING AESCULAP US355

## (undated) DEVICE — SYR 05ML LL W/O NDL

## (undated) DEVICE — TUBE NASOGASTRIC FEEDING ENTRIFLEX ENFIT 12FR 43 8884721252E

## (undated) DEVICE — DRSG TEGADERM 4X4 3/4" 1626W

## (undated) DEVICE — GLOVE BIOGEL PI MICRO SZ 7.5 48575

## (undated) DEVICE — SU SILK 3-0 TIE 12X30" A304H

## (undated) DEVICE — ESU GROUND PAD ADULT REM W/15' CORD E7507DB

## (undated) DEVICE — SU SILK 2-0 SH CR 5X18" C0125

## (undated) DEVICE — SUCTION MANIFOLD NEPTUNE 2 SYS 4 PORT 0702-020-000

## (undated) DEVICE — BUR STRK SIDE CUT 1.6X5.1X44.8MM CARBIDE 6FLUTE 1607-002-107

## (undated) DEVICE — SPONGE KITTNER 30-101

## (undated) DEVICE — CATH TRAY FOLEY SURESTEP 16FR W/URNE MTR STLK LATEX A303316A

## (undated) DEVICE — SPONGE SURGIFOAM 100 1974

## (undated) DEVICE — PACK NEURO MINOR UMMC SNE32MNMU4

## (undated) DEVICE — DRSG TELFA 3X8" 1238

## (undated) DEVICE — SYR 03ML LL W/O NDL 309657

## (undated) DEVICE — PREP SKIN SCRUB TRAY 4461A

## (undated) DEVICE — EYE SPONGE SPEAR WECK CEL 0008685

## (undated) DEVICE — ESU PENCIL SMOKE EVAC W/ROCKER SWITCH 0703-047-000

## (undated) DEVICE — DRSG TEGADERM 4X10" 1627

## (undated) DEVICE — NDL ANGIOCATH 22GA 1" 4050

## (undated) DEVICE — SU ETHILON 9-0 BV130-4 5" 2813G

## (undated) DEVICE — TUBING SMOKE EVAC 3/8"X10' 0702-045-023

## (undated) DEVICE — BLADE DERMATOME ZIMMER  00-8800-000-10

## (undated) DEVICE — ESU ELEC NDL 1" COATED/INSULATED E1465

## (undated) DEVICE — PITCHER STERILE 1000ML  SSK9004A

## (undated) DEVICE — BNDG ESMARK 4" STERILE LF 820-3412

## (undated) DEVICE — WIPES FOLEY CARE SURESTEP PROVON DFC100

## (undated) DEVICE — PREP POVIDONE-IODINE 10% SOLUTION 4OZ BOTTLE MDS093944

## (undated) DEVICE — LINEN TOWEL PACK X6 WHITE 5487

## (undated) DEVICE — CLIP HORIZON SM RED WIDE SLOT 001201

## (undated) DEVICE — ESU ELEC BLADE E-SEP INSULATED NEPTUNE 70MM 0703-070-002

## (undated) DEVICE — NDL COUNTER 20CT 31142493

## (undated) DEVICE — DRAIN JACKSON PRATT RESERVOIR 100ML SU130-1305

## (undated) DEVICE — DRAPE U SPLIT 74X120" 29440

## (undated) RX ORDER — FENTANYL CITRATE-0.9 % NACL/PF 10 MCG/ML
PLASTIC BAG, INJECTION (ML) INTRAVENOUS
Status: DISPENSED
Start: 2023-03-16

## (undated) RX ORDER — ESMOLOL HYDROCHLORIDE 10 MG/ML
INJECTION INTRAVENOUS
Status: DISPENSED
Start: 2023-03-16

## (undated) RX ORDER — ACETAMINOPHEN 325 MG/1
TABLET ORAL
Status: DISPENSED
Start: 2023-03-16

## (undated) RX ORDER — CHLORHEXIDINE GLUCONATE ORAL RINSE 1.2 MG/ML
SOLUTION DENTAL
Status: DISPENSED
Start: 2023-03-16

## (undated) RX ORDER — EPHEDRINE SULFATE 50 MG/ML
INJECTION, SOLUTION INTRAMUSCULAR; INTRAVENOUS; SUBCUTANEOUS
Status: DISPENSED
Start: 2023-03-16

## (undated) RX ORDER — EPINEPHRINE NASAL SOLUTION 1 MG/ML
SOLUTION NASAL
Status: DISPENSED
Start: 2023-03-16

## (undated) RX ORDER — MINERAL OIL
OIL (ML) MISCELLANEOUS
Status: DISPENSED
Start: 2023-03-16

## (undated) RX ORDER — FENTANYL CITRATE 50 UG/ML
INJECTION, SOLUTION INTRAMUSCULAR; INTRAVENOUS
Status: DISPENSED
Start: 2023-03-16

## (undated) RX ORDER — ONDANSETRON 2 MG/ML
INJECTION INTRAMUSCULAR; INTRAVENOUS
Status: DISPENSED
Start: 2023-03-16

## (undated) RX ORDER — EPINEPHRINE IN SOD CHLOR,ISO 1 MG/10 ML
SYRINGE (ML) INTRAVENOUS
Status: DISPENSED
Start: 2023-03-16

## (undated) RX ORDER — PAPAVERINE HYDROCHLORIDE 30 MG/ML
INJECTION INTRAMUSCULAR; INTRAVENOUS
Status: DISPENSED
Start: 2023-03-16

## (undated) RX ORDER — AMPICILLIN AND SULBACTAM 2; 1 G/1; G/1
INJECTION, POWDER, FOR SOLUTION INTRAMUSCULAR; INTRAVENOUS
Status: DISPENSED
Start: 2023-03-16

## (undated) RX ORDER — PROPOFOL 10 MG/ML
INJECTION, EMULSION INTRAVENOUS
Status: DISPENSED
Start: 2023-03-16